# Patient Record
Sex: FEMALE | Race: BLACK OR AFRICAN AMERICAN | Employment: UNEMPLOYED | URBAN - METROPOLITAN AREA
[De-identification: names, ages, dates, MRNs, and addresses within clinical notes are randomized per-mention and may not be internally consistent; named-entity substitution may affect disease eponyms.]

---

## 2018-05-02 ENCOUNTER — APPOINTMENT (OUTPATIENT)
Dept: CT IMAGING | Facility: CLINIC | Age: 28
End: 2018-05-02
Attending: EMERGENCY MEDICINE
Payer: COMMERCIAL

## 2018-05-02 ENCOUNTER — HOSPITAL ENCOUNTER (EMERGENCY)
Facility: CLINIC | Age: 28
Discharge: HOME | End: 2018-05-02
Attending: EMERGENCY MEDICINE | Admitting: EMERGENCY MEDICINE
Payer: COMMERCIAL

## 2018-05-02 VITALS
SYSTOLIC BLOOD PRESSURE: 110 MMHG | BODY MASS INDEX: 25.71 KG/M2 | HEIGHT: 66 IN | WEIGHT: 160 LBS | OXYGEN SATURATION: 98 % | HEART RATE: 86 BPM | RESPIRATION RATE: 16 BRPM | DIASTOLIC BLOOD PRESSURE: 68 MMHG | TEMPERATURE: 98.6 F

## 2018-05-02 DIAGNOSIS — S09.90XA CLOSED HEAD INJURY, INITIAL ENCOUNTER: ICD-10-CM

## 2018-05-02 DIAGNOSIS — F25.9 SCHIZOAFFECTIVE DISORDER, UNSPECIFIED TYPE (H): ICD-10-CM

## 2018-05-02 DIAGNOSIS — S00.93XA TRAUMATIC CEPHALOHEMATOMA, INITIAL ENCOUNTER: ICD-10-CM

## 2018-05-02 LAB — ALCOHOL BREATH TEST: 0 (ref 0–0.01)

## 2018-05-02 PROCEDURE — 96372 THER/PROPH/DIAG INJ SC/IM: CPT

## 2018-05-02 PROCEDURE — 25000128 H RX IP 250 OP 636: Performed by: EMERGENCY MEDICINE

## 2018-05-02 PROCEDURE — 70450 CT HEAD/BRAIN W/O DYE: CPT

## 2018-05-02 PROCEDURE — 90791 PSYCH DIAGNOSTIC EVALUATION: CPT

## 2018-05-02 PROCEDURE — 99285 EMERGENCY DEPT VISIT HI MDM: CPT | Mod: 25

## 2018-05-02 RX ORDER — LORAZEPAM 2 MG/ML
2 INJECTION INTRAMUSCULAR ONCE
Status: COMPLETED | OUTPATIENT
Start: 2018-05-02 | End: 2018-05-02

## 2018-05-02 RX ORDER — DIPHENHYDRAMINE HYDROCHLORIDE 50 MG/ML
INJECTION INTRAMUSCULAR; INTRAVENOUS
Status: DISCONTINUED
Start: 2018-05-02 | End: 2018-05-02 | Stop reason: HOSPADM

## 2018-05-02 RX ORDER — HALOPERIDOL 5 MG/ML
INJECTION INTRAMUSCULAR
Status: DISCONTINUED
Start: 2018-05-02 | End: 2018-05-02 | Stop reason: HOSPADM

## 2018-05-02 RX ORDER — LORAZEPAM 2 MG/ML
INJECTION INTRAMUSCULAR
Status: DISCONTINUED
Start: 2018-05-02 | End: 2018-05-02 | Stop reason: HOSPADM

## 2018-05-02 RX ORDER — DIPHENHYDRAMINE HYDROCHLORIDE 50 MG/ML
50 INJECTION INTRAMUSCULAR; INTRAVENOUS ONCE
Status: COMPLETED | OUTPATIENT
Start: 2018-05-02 | End: 2018-05-02

## 2018-05-02 RX ORDER — HALOPERIDOL 5 MG/ML
10 INJECTION INTRAMUSCULAR ONCE
Status: COMPLETED | OUTPATIENT
Start: 2018-05-02 | End: 2018-05-02

## 2018-05-02 RX ADMIN — LORAZEPAM 2 MG: 2 INJECTION INTRAMUSCULAR; INTRAVENOUS at 03:40

## 2018-05-02 RX ADMIN — HALOPERIDOL LACTATE 10 MG: 5 INJECTION, SOLUTION INTRAMUSCULAR at 03:21

## 2018-05-02 RX ADMIN — DIPHENHYDRAMINE HYDROCHLORIDE 50 MG: 50 INJECTION, SOLUTION INTRAMUSCULAR; INTRAVENOUS at 03:40

## 2018-05-02 NOTE — ED NOTES
Patient denies pain currently and ready for her DEC assessment. Did explain that she would talk with someone via computer and patient willing to speak to them. Patient declined anything to eat. [place] police outside room and staff sitting with patient.

## 2018-05-02 NOTE — ED NOTES
Ordered meal tray for pt, pt will respond to questions when asked but is very drowsy still. Small abrasion noted on pt forehead, small bruise on front of RLE. No other signs of visible injuries pt denies pain when asked. VSS. Sitter at bedside with PD officer.
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
## 2018-05-02 NOTE — ED AVS SNAPSHOT
[facility] Emergency Department    [address]    [facility] [address]    Phone:  [phone]    Fax:  [phone]                                       Ilene Liu   MRN: [id number]    Department:  [facility] Emergency Department   Date of Visit:  5/2/2018           After Visit Summary Signature Page     I have received my discharge instructions, and my questions have been answered. I have discussed any challenges I see with this plan with the nurse or doctor.    ..........................................................................................................................................  Patient/Patient Representative Signature      ..........................................................................................................................................  Patient Representative Print Name and Relationship to Patient    ..................................................               ................................................  Date                                            Time    ..........................................................................................................................................  Reviewed by Signature/Title    ...................................................              ..............................................  Date                                                            Time
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
## 2018-05-02 NOTE — DISCHARGE INSTRUCTIONS
"  Recognizing Suicide Warning Signs in Yourself    People who are thinking about suicide may not know they are depressed. Certain thoughts, feelings, and actions can be signals that let you know you may need help. The best thing you can do is watch for signs that you may be at risk. Then, ask for help. You can talk to your regular healthcare provider or seek help from a mental health provider.  Depression  Depression is a treatable illness. To know if depression is causing you to feel like ending your life, ask yourself:    Do I feel worthless, guilty, helpless, or hopeless?    Have I been feeling sad, down, or blue on most days?    Have I lost interest in my work or people I used to enjoy?    Do I have trouble sleeping or do I sleep too much?    Do I eat more or less than usual?    Do I feel tired, weak, and low on energy?    Do I feel restless and unable to sit still?    Do I have trouble thinking or making choices?    Do I cry more than usual?    Do I feel life isn't worth living?  Warning signs for suicide    Thinking often about taking your life    Planning how you may attempt it    Talking or writing about committing suicide    Feeling that death is the only solution to your problems    Feeling a pressing need to make out your will or arrange your     Giving away things you own    Participating in risky behaviors, such as sex with someone you don't know or drinking and driving    Buying a lethal weapon, such as a gun, or hoarding medicines that could be used in an over dose  If you notice any of these warning signs, call for help right away or go to your closest hospital emergency department. You can also call a mental health clinic or a 24-hour suicide crisis hotline for help and support. Search for local suicide prevention resources on your computer or look for the number in the white pages of your phone book under \"Suicide.\" In an emergency, if you are in immediate risk of harming yourself, call 911. " For more information about depression:    National [place] of Mental Chswbu313-644-0772pcr.[facility].nih.gov    National Suicide Prevention Tuzdgtxn134-624-2678 (320-318-XVQY)www.suicidepreventionlifeline.org    National [place] on Mental Zyugabk681-456-4064pfr.jose.org    Mental Health Ppeejdp539-838-3046hkb.[facility].org    National Suicide Dpgqoxd137-987-1140 (800-SUICIDE)   Date Last Reviewed: 1/1/2017 2000-2017 [employer]. [address] [place] [address]. All rights reserved. This information is not intended as a substitute for professional medical care. Always follow your healthcare professional's instructions.    Discharge Instructions  Head Injury    You have been seen today for a head injury. Your evaluation included a history and physical examination. You may have had a CT (CAT) scan performed, though most head injuries do not require a scan. Based on this evaluation, your provider today does not feel that your head injury is serious.    Generally, every Emergency Department visit should have a follow-up clinic visit with either a primary or a specialty clinic/provider. Please follow-up as instructed by your emergency provider today.  Return to the Emergency Department if:    You are confused or you are not acting right.    Your headache gets worse or you start to have a really bad headache even with your recommended treatment plan.    You vomit (throw up) more than once.    You have a seizure.    You have trouble walking.    You have weakness or paralysis (cannot move) in an arm or a leg.    You have blood or fluid coming from your ears or nose.    You have new symptoms or anything that worries you.    Sleeping:  It is okay for you to sleep, but someone should wake you up if instructed by your provider, and someone should check on you at your usual time to wake up.     Activity:    Do not drive for at least 24 hours.    Do not drive if you have dizzy spells or trouble concentrating,  or remembering things.    Do not return to any contact sports until cleared by your regular provider.     MORE INFORMATION:    Concussion:  A concussion is a minor head injury that may cause temporary problems with the way the brain works. Although concussions are important, they are generally not an emergency or a reason that a person needs to be hospitalized. Some concussion symptoms include confusion, amnesia (forgetful), nausea (sick to your stomach) and vomiting (throwing up), dizziness, fatigue, memory or concentration problems, irritability and sleep problems. For most people, concussions are mild and temporary but some will have more severe and persistent symptoms that require on-going care and treatment.  CT Scans: Your evaluation today may have included a CT scan (CAT scan) to look for things like bleeding or a skull fracture (broken bone).  CT scans involve radiation and too many CT scans can cause serious health problems like cancer, especially in children.  Because of this, your provider may not have ordered a CT scan today if they think you are at low risk for a serious or life threatening problem.    If you were given a prescription for medicine here today, be sure to read all of the information (including the package insert) that comes with your prescription.  This will include important information about the medicine, its side effects, and any warnings that you need to know about.  The pharmacist who fills the prescription can provide more information and answer questions you may have about the medicine.  If you have questions or concerns that the pharmacist cannot address, please call or return to the Emergency Department.     Remember that you can always come back to the Emergency Department if you are not able to see your regular provider in the amount of time listed above, if you get any new symptoms, or if there is anything that worries you.

## 2018-05-02 NOTE — ED NOTES
Brought computer in for DEC assessment for patient to be evaluated. [place] [employer] and sitter at bedside watching for patients safety.

## 2018-05-02 NOTE — ED PROVIDER NOTES
History     Chief Complaint:  Head injury     HPI   Ilene Liu is a 28 year old female who presents to the emergency department today for evaluation of head injury. The police report the patient was arrested for second degree assault tonight. She threatened another kalin on scene with a knife. She then started screaming and yelling at the police after a male  searched her, demanding a female officer to search her instead. She then apologized to police, but then began screaming at the victim. She then apologized to the victim, but after she realized she was getting arrested she stopped cooperating, became aggressive, and started hitting her head multiple times against the partition. The patient briefly lost consciousness. The police report the patient was possibly drinking tonight with marijuana use. Per police, per the patient's friend, the patient has a problem with alcohol or marijuana use. The patient reports she does not remember what happened tonight and denies alcohol use tonight. The patient reports she does not know where she is. The patient denies abdominal pain.    Allergies:  No Known Drug Allergies    Medications:    Medications reviewed. No current medications.     Past Medical History:    Schizo-affective type schizophrenia, in remission    Past Surgical History:    Surgical history reviewed. No pertinent surgical history.    Family History:    Family history reviewed. No pertinent family history.     Social History:  The patient was accompanied to the ED by police.  Smoking Status: Current Every Day Smoker  Smokeless Tobacco: Never Used  Alcohol Use: Negative per patient  Illicit Drug Use: Positive for Marijuana    Marital Status:  Single [1]     Review of Systems   Unable to perform ROS: Mental status change     Physical Exam     Patient Vitals for the past 24 hrs:   BP Temp Temp src Pulse Resp SpO2 Height Weight   05/02/18 0525 92/50 - - 80 20 100 % - -   05/02/18 0415 107/60  "- - - - 99 % - -   05/02/18 0400 104/58 - - - - 100 % - -   05/02/18 0345 117/71 - - - - 100 % - -   05/02/18 0342 - - - - - 100 % - -   05/02/18 0340 - - - - - 100 % - -   05/02/18 0339 - - - - - 100 % - -   05/02/18 0336 - - - - - 100 % - -   05/02/18 0332 129/84 - - - - 100 % - -   05/02/18 0330 122/84 - - - - - - -   05/02/18 0257 (!) 133/94 98.6  F (37  C) Oral 98 18 100 % 1.676 m (5' 6\") 72.6 kg (160 lb)     Physical Exam  Constitutional: Alert, refusing to answer questions, mumbles responses, young woman sitting in bed, holding her hands against her forehead  HENT: frontal cephalohematoma 3x2 cm, no hemotympanum    Nose: Nose normal.    Mouth/Throat: Oropharynx is clear, mucous membranes are moist   Eyes: Normal conjunctiva. Pupils are equal, round, and reactive to light.   Neck: full active range of motion  CV: regular rate and rhythm; no murmurs, rubs or gallups  Chest: Effort normal and breath sounds normal.   GI:  There is no tenderness. No distension. Normal bowel sounds  MSK: Normal range of motion.   Neurological: Alert, eyes open, moving all extremities and follows commands in all extremities  Skin: Skin is warm and dry.  Psych: refusing to answer questions, delayed responses  Emergency Department Course     Imaging:  Radiology findings were communicated with the patient who voiced understanding of the findings.    CT Head w/o Contrast  No evidence of acute intracranial trauma.  Reading per radiology    Laboratory:    Alcohol breath test POCT: 0.00    Interventions:  0321 Haldol 10 mg IM  0340 Benadryl 50 mg IM  0340 Ativan 2 mg IV    Emergency Department Course:    0257 Nursing notes and vitals reviewed.    0300 I performed an exam of the patient as documented above.     0318 Code 21 called.  Patient tried to choke herself with pulse oximeter by wrapping it around her neck.  Pulse oximeter removed, and patient placed in five point restraints placed for violent behavior towards self.  Chemical " restraints also initiated.     0331 The patient is yelling and continues to be agitated after haldol. Additional IM meds were ordered.     0333  I rechecked the patient.     0402  In person face to face assessment completed, including an evaluation of the patient's immediate reaction to the intervention, complete review of systems assessment, behavioral assessment and review/assessment of history, drugs and medications, recent labs, etc., and behavioral condition.  The patient experienced: No adverse physical outcome from seclusion/restraint initiation.  The intervention of restraint was discontinued.  Personal sitter will be at bedside to watch patient.      0412 I rechecked the patient.    0423 The patient was sent for a CT Head w/o Contrast while in the emergency department, results above.     0523 I rechecked the patient.     0638  I rechecked the patient.  Will wake up to verbal stimulus and sit up in bed.  She still has wobbly gait and is not yet able to fully ambulate on her own.      The patient will be transferred back into the custody of police when she is able to ambulate with a steady gait on her own.     Impression & Plan      Medical Decision Making:  Ilene Liu is a 28 year old female with history of schizoaffective disorder, who presents to the emergency department today for evaluation of head trauma and period of unresponsiveness while in police custody.  Differential diagnosis includes traumatic brain injury, intracranial hemorrhage, concussion, malingering, alcohol intoxication, drug intoxication, schizophrenia, schizoaffective disorder, bipolar.  Patient was originally cooperative with exam, but seemed confused. She then tried to strangle herself using the pulse oximeter cord, and had to be placed in 5 point restraints and chemically sedated as above.  There is no evidence of soft or secondary signs of aerodigestive injury with examination of her neck.  There had previously been no  mention of suicidality. I performed a repeat face-to-face examination, restraints were able to be removed and she was cooperative enough to get the CT scan of her head.  It does not show any evidence of intracranial hemorrhage or skull fracture.      Patient is being watched by sitter given suicidal and impulsive behavior.  She has not yet been able to ambulate with a stable gait on her own yet secondary to receiving Benadryl, Ativan, and Haldol for sedation. No symptoms or report of ingestion.  Once she is medically cleared with a stable gait, she will be discharged back into the custody of police, who are here at bedside.  She was previously under arrest earlier tonight when she was threatening someone with a knife.  We will keep her on suicide watch while in penitentiary.  This plan was discussed with the oncoming ED physician, Dr. Peterson.      Diagnosis:    ICD-10-CM    1. Traumatic cephalohematoma, initial encounter S00.93XA    2. Closed head injury, initial encounter S09.90XA      Disposition:   The patient was signed out to the oncoming ED physician, Dr. Peterson.    Scribe Disclosure:  Cooper CHAPPELL, am serving as a scribe at 2:54 AM on 5/2/2018 to document services personally performed by Gniny Curran MD based on my observations and the provider's statements to me.    [facility] EMERGENCY DEPARTMENT       Ginny Curran MD  05/02/18 0716       Ginny Curran MD  05/02/18 0853

## 2018-05-02 NOTE — ED PROVIDER NOTES
Patient signed out to me by Dr. Curran awaiting return to baseline mental status and ambulation trial after arriving in police custody for alleged assault. On review of chart, it appears as though the patient also made threats and actions to harm herself in the ED and therefore DEC evaluation will be obtained.     On reassessment the patient is sleeping. She is easily awakened and cooperative briefly, but quickly falls back to sleep. She is denying any pain, but does not give a clear answer on SI. She is otherwise stable and calm. No indication for restraints. Both a sitter and  are with the patient at this time awaiting increased level of awakeness and DEC evaluation prior to discharge in police custody.     Caitlin Peterson MD  05/02/18 [id number]

## 2018-05-02 NOTE — ED NOTES
[facility] patient coughing / possible vomiting sound. Entered room to find patient had tied the pulse oximeter cord around her neck several times and pulling in opposite directions in a choking fashion. Called for help, removed cord with assistance of officer. Additional staff in room. CODE 21 called over head. Security present, patient verbally screaming and threatening staff with spitting/physical assault. Restraints applied per facility protocol. CMS intact all limbs. Chest restraint also applied all attachments secured. Medicated per MAR. All staff / patient safety maintained during Code 21. Physician in room.

## 2018-05-02 NOTE — ED NOTES
Patient restraints removed for CT, accompanied by Security & staff. Patient cooperative stating she will cooperate

## 2018-05-02 NOTE — ED NOTES
"Patient verbally screaming \"Fuck Ya, Fuck you\". Patient screaming out multiple verbal assaultive phrases and uncontrolled.   "

## 2018-05-02 NOTE — ED NOTES
Patient continues to be verbally abusive, screaming out, threatening despite all de-escalating techniques applied by staff. Patient medicated per MAR.

## 2018-11-27 ENCOUNTER — TRANSFERRED RECORDS (OUTPATIENT)
Dept: HEALTH INFORMATION MANAGEMENT | Facility: CLINIC | Age: 28
End: 2018-11-27

## 2018-11-27 ENCOUNTER — HOSPITAL ENCOUNTER (INPATIENT)
Facility: CLINIC | Age: 28
LOS: 42 days | Discharge: HOME | End: 2019-01-09
Attending: PSYCHIATRY & NEUROLOGY | Admitting: PSYCHIATRY & NEUROLOGY
Payer: COMMERCIAL

## 2018-11-27 DIAGNOSIS — F17.213 CIGARETTE NICOTINE DEPENDENCE WITH WITHDRAWAL: ICD-10-CM

## 2018-11-27 DIAGNOSIS — F19.10 POLYSUBSTANCE ABUSE (H): ICD-10-CM

## 2018-11-27 DIAGNOSIS — F25.0 SCHIZOAFFECTIVE DISORDER, BIPOLAR TYPE (H): Primary | ICD-10-CM

## 2018-11-27 DIAGNOSIS — F25.9 SCHIZOAFFECTIVE DISORDER, UNSPECIFIED TYPE (H): ICD-10-CM

## 2018-11-27 PROCEDURE — 99285 EMERGENCY DEPT VISIT HI MDM: CPT | Mod: 25 | Performed by: PSYCHIATRY & NEUROLOGY

## 2018-11-27 PROCEDURE — 99284 EMERGENCY DEPT VISIT MOD MDM: CPT | Mod: Z6 | Performed by: PSYCHIATRY & NEUROLOGY

## 2018-11-27 PROCEDURE — 25000132 ZZH RX MED GY IP 250 OP 250 PS 637: Performed by: PSYCHIATRY & NEUROLOGY

## 2018-11-27 PROCEDURE — 90791 PSYCH DIAGNOSTIC EVALUATION: CPT

## 2018-11-27 RX ORDER — OLANZAPINE 10 MG/1
10 TABLET, ORALLY DISINTEGRATING ORAL ONCE
Status: COMPLETED | OUTPATIENT
Start: 2018-11-27 | End: 2018-11-27

## 2018-11-27 RX ORDER — HYDROXYZINE HYDROCHLORIDE 25 MG/1
25 TABLET, FILM COATED ORAL 3 TIMES DAILY PRN
Status: ON HOLD | COMMUNITY
Start: 2018-09-25 | End: 2019-01-07

## 2018-11-27 RX ORDER — OLANZAPINE 10 MG/2ML
10 INJECTION, POWDER, FOR SOLUTION INTRAMUSCULAR ONCE
Status: DISCONTINUED | OUTPATIENT
Start: 2018-11-27 | End: 2018-11-28 | Stop reason: CLARIF

## 2018-11-27 RX ORDER — OLANZAPINE 10 MG/1
10 TABLET, ORALLY DISINTEGRATING ORAL ONCE
Status: DISCONTINUED | OUTPATIENT
Start: 2018-11-27 | End: 2018-11-28 | Stop reason: CLARIF

## 2018-11-27 RX ORDER — OLANZAPINE 10 MG/1
10 TABLET ORAL AT BEDTIME
Status: ON HOLD | COMMUNITY
Start: 2018-09-25 | End: 2019-01-07

## 2018-11-27 RX ORDER — OLANZAPINE 5 MG/1
5 TABLET ORAL EVERY MORNING
Status: ON HOLD | COMMUNITY
Start: 2018-09-25 | End: 2019-01-07

## 2018-11-27 RX ORDER — ERGOCALCIFEROL 1.25 MG/1
50000 CAPSULE, LIQUID FILLED ORAL
Status: ON HOLD | COMMUNITY
Start: 2018-09-01 | End: 2019-01-07

## 2018-11-27 RX ORDER — BUPROPION HYDROCHLORIDE 150 MG/1
150 TABLET ORAL DAILY
Status: ON HOLD | COMMUNITY
Start: 2018-09-25 | End: 2019-01-07

## 2018-11-27 RX ADMIN — OLANZAPINE 10 MG: 10 TABLET, ORALLY DISINTEGRATING ORAL at 19:48

## 2018-11-27 RX ADMIN — OLANZAPINE 10 MG: 10 TABLET, ORALLY DISINTEGRATING ORAL at 23:50

## 2018-11-27 ASSESSMENT — ENCOUNTER SYMPTOMS
NERVOUS/ANXIOUS: 1
CHEST TIGHTNESS: 0
SHORTNESS OF BREATH: 0
DIZZINESS: 0
FEVER: 0
BACK PAIN: 0
HALLUCINATIONS: 1
ABDOMINAL PAIN: 0
DYSPHORIC MOOD: 0

## 2018-11-27 NOTE — ED NOTES
ED to Behavioral Floor Handoff    SITUATION  Ilene Liu is a 28 year old female who speaks English and lives in a home with others  The patient arrived in the ED by POLICE from home with a complaint of Hallucinations (hx of schizophrenia. pt states she has people following her, watching her, etc. started acting aggressively with residents of her apartment [facility])  .    The patient has had symptoms for 3 year(s) and during this time the symptoms have increased.     Initial vitals were: BP: 111/73  Pulse: 85  SpO2: 98 %   Is the patient diabetic? No   If yes, last blood glucose? --  If yes, was this treated in the ED? --  Does the patient have a seizure history? No   If yes, date of most recent seizure--  Is the patient inebriated (ETOH) or intoxicated (other substance)? No  MSSA done? N/A  Last MSSA score: --  Were withdrawal symptoms treated? N/A  Is the patient patient experiencing suicidal ideation? denies current or recent suicidal ideation     Homicidal ideation? denies current or recent homicidal ideation or behaviors.    Self-injurious behavior/urges? denies current or recent self injurious behavior or ideation.  Was pt aggressive in the ED No  Was a code called No  Is the pt now cooperative? Yes  Meds given in ED: Medications - No data to display   Family present during ED course? No  Family currently present? No    BACKGROUND  In the ED, pt was diagnosed with   Final diagnoses:   Schizoaffective disorder, unspecified type (H)   Polysubstance abuse (H)      Does the patient have a cognitive impairment or developmental disability? No  Patient's home meds are:  Allergies: No Known Allergies.   Social demographics are   Social History     Social History     Marital status: Single     Spouse name: N/A     Number of children: N/A     Years of education: N/A     Social History Main Topics     Smoking status: Current Every Day Smoker     Smokeless tobacco: Never Used     Alcohol use Yes     Drug use: Yes      "Special: Marijuana, \"Crack\" cocaine     Sexual activity: Not Asked     Other Topics Concern     None     Social History Narrative        ASSESSMENT  Labs results Labs Ordered and Resulted from Time of ED Arrival Up to the Time of Departure from the ED - No data to display   Imaging Studies: No results found for this or any previous visit (from the past 24 hour(s)).   Most recent vital signs /73  Pulse 85  LMP 10/31/2018 (Approximate)  SpO2 98%   Abnormal labs/tests/findings requiring intervention:---   Pain control: pt had none  Nausea control: pt had none    RECOMMENDATION  Are any infection precautions needed (MRSA, VRE, etc.)? No   If yes, what infection? --  Does the patient mobility issues? independently.  If yes what device does the pt use? ---  Is patient on 72 hour hold or commitment? Yes  If on 72 hour hold, have hold and rights been given to patient? Yes  Are admitting orders written if after 10 p.m. ?N/A  Tasks needing to be completed:---     Yarely Joya   asc-- voica   [phone] [facility]   [phone] [facility]  "

## 2018-11-27 NOTE — ED NOTES
Pt has been placed on a 72 hour hold. She has been given a copy of the hold and the pt bill of rights.

## 2018-11-27 NOTE — ED PROVIDER NOTES
History     Chief Complaint   Patient presents with     Hallucinations     hx of schizophrenia. pt states she has people following her, watching her, etc. started acting aggressively with residents of her apartment bldg     The history is provided by the patient, medical records and a relative ().     Ilene Liu is a 28 year old female who comes in due to her calling the police secondary to her hallucinations and paranoia.  She has been believing that people are following her.  She is hearing voices.  She is a poor historian.  She called the police today due to feeling that someone was following her.  They brought her to the ED.  She has been aggressive at the apartment building she has been staying at.  She lives between friends and family.  She has been diagnosed with schizoaffective disorder and polysubstance dependence.  She is on a provisional discharge.  Per the , she has been using cocaine, alcohol and marijuana.  It is questionable if she has been taking her medications.  The  would like to revoke her provisional discharge.  The patient was committed through [facility].  She has been calm but not overly cooperative.  She has refused to give a urine sample.      Please see the 's assessment in EPIC from today (11/27/18) for further details.    I have reviewed the Medications, Allergies, Past Medical and Surgical History, and Social History in the Epic system.    Review of Systems   Constitutional: Negative for fever.   Eyes: Negative for visual disturbance.   Respiratory: Negative for chest tightness and shortness of breath.    Cardiovascular: Negative for chest pain.   Gastrointestinal: Negative for abdominal pain.   Musculoskeletal: Negative for back pain.   Neurological: Negative for dizziness.   Psychiatric/Behavioral: Positive for behavioral problems and hallucinations. Negative for dysphoric mood, self-injury and suicidal ideas. The patient is  nervous/anxious.    All other systems reviewed and are negative.      Physical Exam   BP: 111/73  Pulse: 85  SpO2: 98 %      Physical Exam   Constitutional: She is oriented to person, place, and time. She appears well-developed and well-nourished.   Cardiovascular: Normal rate, regular rhythm and normal heart sounds.    Pulmonary/Chest: Effort normal and breath sounds normal. No respiratory distress.   Neurological: She is alert and oriented to person, place, and time.   Psychiatric: She has a normal mood and affect. Her speech is normal. She is actively hallucinating. Thought content is paranoid and delusional. Cognition and memory are normal. She expresses inappropriate judgment. She expresses no homicidal and no suicidal ideation. She expresses no suicidal plans and no homicidal plans.   Ilene is a 29 y/o female who looks her age.  She is well groomed with good eye contact.   Nursing note and vitals reviewed.      ED Course     ED Course     Procedures               Labs Ordered and Resulted from Time of ED Arrival Up to the Time of Departure from the ED - No data to display         Assessments & Plan (with Medical Decision Making)   Ilene will be admitted to the hospital due to her decompensation, use of drugs and possible not taking medications.  Her provisional discharge is being revoked by the  but the paperwork has not been sent as the time of this note.  She will be put on a  72 hour hold until the revocation paperwork arrives.      I have reviewed the nursing notes.    I have reviewed the findings, diagnosis, plan and need for follow up with the patient.    New Prescriptions    No medications on file       Final diagnoses:   Schizoaffective disorder, unspecified type (H)   Polysubstance abuse (H)       11/27/2018   [facility] [place], EMERGENCY DEPARTMENT     Giuliano Rousseau MD  11/27/18 3652

## 2018-11-28 PROBLEM — F25.9 SCHIZOAFFECTIVE DISORDER (H): Status: ACTIVE | Noted: 2018-11-28

## 2018-11-28 PROBLEM — F19.10 POLYSUBSTANCE ABUSE (H): Status: ACTIVE | Noted: 2018-11-28

## 2018-11-28 LAB
ALBUMIN SERPL-MCNC: 3.5 G/DL (ref 3.4–5)
ALP SERPL-CCNC: 46 U/L (ref 40–150)
ALT SERPL W P-5'-P-CCNC: 18 U/L (ref 0–50)
ANION GAP SERPL CALCULATED.3IONS-SCNC: 7 MMOL/L (ref 3–14)
AST SERPL W P-5'-P-CCNC: 15 U/L (ref 0–45)
BASOPHILS # BLD AUTO: 0 10E9/L (ref 0–0.2)
BASOPHILS NFR BLD AUTO: 0 %
BILIRUB SERPL-MCNC: 0.5 MG/DL (ref 0.2–1.3)
BUN SERPL-MCNC: 13 MG/DL (ref 7–30)
CALCIUM SERPL-MCNC: 9 MG/DL (ref 8.5–10.1)
CHLORIDE SERPL-SCNC: 107 MMOL/L (ref 94–109)
CO2 SERPL-SCNC: 26 MMOL/L (ref 20–32)
CREAT SERPL-MCNC: 0.48 MG/DL (ref 0.52–1.04)
DIFFERENTIAL METHOD BLD: ABNORMAL
EOSINOPHIL # BLD AUTO: 0.2 10E9/L (ref 0–0.7)
EOSINOPHIL NFR BLD AUTO: 5.1 %
ERYTHROCYTE [DISTWIDTH] IN BLOOD BY AUTOMATED COUNT: 12.2 % (ref 10–15)
GFR SERPL CREATININE-BSD FRML MDRD: >90 ML/MIN/1.7M2
GLUCOSE SERPL-MCNC: 140 MG/DL (ref 70–99)
HCT VFR BLD AUTO: 39.8 % (ref 35–47)
HGB BLD-MCNC: 13.8 G/DL (ref 11.7–15.7)
IMM GRANULOCYTES # BLD: 0 10E9/L (ref 0–0.4)
IMM GRANULOCYTES NFR BLD: 0.3 %
LYMPHOCYTES # BLD AUTO: 1.4 10E9/L (ref 0.8–5.3)
LYMPHOCYTES NFR BLD AUTO: 36.8 %
MCH RBC QN AUTO: 31.7 PG (ref 26.5–33)
MCHC RBC AUTO-ENTMCNC: 34.7 G/DL (ref 31.5–36.5)
MCV RBC AUTO: 91 FL (ref 78–100)
MONOCYTES # BLD AUTO: 0.2 10E9/L (ref 0–1.3)
MONOCYTES NFR BLD AUTO: 4.9 %
NEUTROPHILS # BLD AUTO: 2.1 10E9/L (ref 1.6–8.3)
NEUTROPHILS NFR BLD AUTO: 52.9 %
NRBC # BLD AUTO: 0 10*3/UL
NRBC BLD AUTO-RTO: 0 /100
PLATELET # BLD AUTO: 181 10E9/L (ref 150–450)
POTASSIUM SERPL-SCNC: 3.8 MMOL/L (ref 3.4–5.3)
PROT SERPL-MCNC: 7.3 G/DL (ref 6.8–8.8)
RBC # BLD AUTO: 4.36 10E12/L (ref 3.8–5.2)
SODIUM SERPL-SCNC: 140 MMOL/L (ref 133–144)
TSH SERPL DL<=0.005 MIU/L-ACNC: 0.4 MU/L (ref 0.4–4)
WBC # BLD AUTO: 3.9 10E9/L (ref 4–11)

## 2018-11-28 PROCEDURE — 36415 COLL VENOUS BLD VENIPUNCTURE: CPT | Performed by: PSYCHIATRY & NEUROLOGY

## 2018-11-28 PROCEDURE — 99223 1ST HOSP IP/OBS HIGH 75: CPT | Mod: AI | Performed by: PSYCHIATRY & NEUROLOGY

## 2018-11-28 PROCEDURE — 85025 COMPLETE CBC W/AUTO DIFF WBC: CPT | Performed by: PSYCHIATRY & NEUROLOGY

## 2018-11-28 PROCEDURE — 25000132 ZZH RX MED GY IP 250 OP 250 PS 637: Performed by: PSYCHIATRY & NEUROLOGY

## 2018-11-28 PROCEDURE — 12400003 ZZH R&B MH CRITICAL UMMC

## 2018-11-28 PROCEDURE — 84443 ASSAY THYROID STIM HORMONE: CPT | Performed by: PSYCHIATRY & NEUROLOGY

## 2018-11-28 PROCEDURE — 80053 COMPREHEN METABOLIC PANEL: CPT | Performed by: PSYCHIATRY & NEUROLOGY

## 2018-11-28 RX ORDER — BISACODYL 5 MG
5 TABLET, DELAYED RELEASE (ENTERIC COATED) ORAL DAILY PRN
Status: DISCONTINUED | OUTPATIENT
Start: 2018-11-28 | End: 2019-01-09 | Stop reason: HOSPADM

## 2018-11-28 RX ORDER — BUPROPION HYDROCHLORIDE 150 MG/1
150 TABLET ORAL DAILY
Status: DISCONTINUED | OUTPATIENT
Start: 2018-11-28 | End: 2018-11-28

## 2018-11-28 RX ORDER — TRAZODONE HYDROCHLORIDE 50 MG/1
50 TABLET, FILM COATED ORAL
Status: DISCONTINUED | OUTPATIENT
Start: 2018-11-28 | End: 2019-01-09 | Stop reason: HOSPADM

## 2018-11-28 RX ORDER — ACETAMINOPHEN 325 MG/1
325 TABLET ORAL EVERY 4 HOURS PRN
Status: DISCONTINUED | OUTPATIENT
Start: 2018-11-28 | End: 2018-11-28

## 2018-11-28 RX ORDER — ACETAMINOPHEN 325 MG/1
650 TABLET ORAL EVERY 4 HOURS PRN
Status: DISCONTINUED | OUTPATIENT
Start: 2018-11-28 | End: 2019-01-09 | Stop reason: HOSPADM

## 2018-11-28 RX ORDER — ALUMINA, MAGNESIA, AND SIMETHICONE 2400; 2400; 240 MG/30ML; MG/30ML; MG/30ML
30 SUSPENSION ORAL EVERY 4 HOURS PRN
Status: DISCONTINUED | OUTPATIENT
Start: 2018-11-28 | End: 2018-12-16

## 2018-11-28 RX ORDER — OLANZAPINE 5 MG/1
5 TABLET ORAL EVERY MORNING
Status: DISCONTINUED | OUTPATIENT
Start: 2018-11-28 | End: 2018-11-29

## 2018-11-28 RX ORDER — HYDROXYZINE HYDROCHLORIDE 25 MG/1
25 TABLET, FILM COATED ORAL 3 TIMES DAILY PRN
Status: DISCONTINUED | OUTPATIENT
Start: 2018-11-28 | End: 2019-01-09 | Stop reason: HOSPADM

## 2018-11-28 RX ORDER — CLOZAPINE 25 MG/1
25 TABLET, ORALLY DISINTEGRATING ORAL AT BEDTIME
Status: DISCONTINUED | OUTPATIENT
Start: 2018-11-28 | End: 2018-11-30

## 2018-11-28 RX ORDER — OLANZAPINE 10 MG/2ML
10 INJECTION, POWDER, FOR SOLUTION INTRAMUSCULAR 3 TIMES DAILY PRN
Status: DISCONTINUED | OUTPATIENT
Start: 2018-11-28 | End: 2019-01-09 | Stop reason: HOSPADM

## 2018-11-28 RX ORDER — TRAZODONE HYDROCHLORIDE 50 MG/1
50 TABLET, FILM COATED ORAL AT BEDTIME
Status: DISCONTINUED | OUTPATIENT
Start: 2018-11-28 | End: 2018-11-28

## 2018-11-28 RX ORDER — OLANZAPINE 10 MG/1
10 TABLET, ORALLY DISINTEGRATING ORAL 3 TIMES DAILY PRN
Status: DISCONTINUED | OUTPATIENT
Start: 2018-11-28 | End: 2019-01-09 | Stop reason: HOSPADM

## 2018-11-28 RX ORDER — OLANZAPINE 10 MG/1
10 TABLET ORAL AT BEDTIME
Status: DISCONTINUED | OUTPATIENT
Start: 2018-11-28 | End: 2018-11-29

## 2018-11-28 RX ADMIN — OLANZAPINE 10 MG: 10 TABLET, FILM COATED ORAL at 21:03

## 2018-11-28 RX ADMIN — OLANZAPINE 5 MG: 5 TABLET, FILM COATED ORAL at 09:04

## 2018-11-28 RX ADMIN — BUPROPION HYDROCHLORIDE 150 MG: 150 TABLET, FILM COATED, EXTENDED RELEASE ORAL at 09:04

## 2018-11-28 ASSESSMENT — ACTIVITIES OF DAILY LIVING (ADL)
ORAL_HYGIENE: INDEPENDENT;PROMPTS
GROOMING: PROMPTS
ORAL_HYGIENE: PROMPTS
DRESS: INDEPENDENT
GROOMING: PROMPTS
DRESS: SCRUBS (BEHAVIORAL HEALTH)

## 2018-11-28 NOTE — PROGRESS NOTES
"28 year old female admitted to unit from  ED on 72 hour hold at about 0105. Per report, patient called 911 to complain about voices she was hearing. Patient reports that she is here because \"people are following me - violating my fourth amendment rights - I want to be left alone.\"  Per report, the patient has a  History of schizoaffective disorder and substance use (alcohol, cocaine and marijuana) yet patient denied all substance use except tobacco.  Patient has thus far refused Utox.  Patient reports that she has been recently suicidal \"taking a bunch of pills but not an overdose\" within the last few days.  Patient states that she has had other attempts but will not elaborate.  Patient denies current SI, SIB, HI and hallucinations.  Patient makes many paranoid statements throughout the interview stating that there are cameras everywhere with the government and her neighbors stalking her.  Per DEC report, patient is on probation for assaulting police, she typically receives care at [facility] and she is currently under civil commitment and [place] til 1/19/19.    Upon admission, patient refused VS.  She did participate in most of safety search but did refuse to remove her bra.  Orders were received for an SIO due to the inability to complete the entire safety search.  Further attempts will be made to complete the search when the patient awakens.  "

## 2018-11-28 NOTE — ED NOTES
Pt. coming out of room yelling at security. When security  approached pt. she spit at them.  Writer then told pt. to stop spitting and return to room.  Pt. complied, and stated that she would take zyprexa ODT.  Given 10 mg Zyprexa ODT.

## 2018-11-28 NOTE — ED NOTES
"Pt refused oral Zyprexa at this time. \" I already got the med right now\" Pt paranoid, \" am I in trouble, why are they here\" am I in trouble?\" pt redirected  "
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
## 2018-11-28 NOTE — PLAN OF CARE
Problem: Patient Care Overview  Goal: Team Discussion  Team Plan:   BEHAVIORAL TEAM DISCUSSION    Participants: dr hull, christa alberto rn, opal payne [facility]  Progress: none yet; pt was just admitted.   She has not been taking meds, has been paranoid (thinking others are following her and watching her), has been aggressive and hostile toward family, has made suicidal statements, cancelled two apt's with her Co CM, and drinking alcohol daily.  Continued Stay Criteria/Rationale: due to the above.  Medical/Physical: per internal medicine  Precautions:   Behavioral Orders   Procedures     Code 1 - Restrict to Unit     Routine Programming     As clinically indicated     Status 15     Every 15 minutes.     Suicide precautions     Patients on Suicide Precautions should have a Combination Diet ordered that includes a Diet selection(s) AND a Behavioral Tray selection for Safe Tray - with utensils, or Safe Tray - NO utensils       Plan: meds per dr hull.    PD is being revoked.    July 10, 2018 - committed as MI to [facility].   Aranda:  Clozaril, Zyprexa, Abilify, Latuda.    Pt will need a remote PD at time of discharge.  This will be coordinated with [facility].   Discharge planning itself will be coordinated with pt's [facility] worker(s)  Rationale for change in precautions or plan: no change at this time.

## 2018-11-28 NOTE — PROGRESS NOTES
11/28/18 0349   Patient Belongings   Did you bring any home meds/supplements to the hospital?  Yes   Disposition of meds  Other (see comment)   Patient Belongings keys;money (see comment);purse;shoes;wallet;other (see comments)   Disposition of Belongings Other (see comment)   Belongings Search Yes   Clothing Search Yes   Second Staff RN   General Info Comment Pt had no cell phone and only had $0.96 in cash     -TWO BOTTLES OF MEDICATIONS GIVEN TO RN      -ITEMS IN PATIENT STORAGE LOCKER #3::    1 sweater- placed in pt locker  2 scarves- placed in pt locker  1 dress- placed in pt locker  1 pair of pants- placed in pt locker  2 skirts- placed in pt locker  1 jacket- placed in pt locker  1 box cigarettes- placed in pt locker  1 glass container- placed in pt locker  1 lighter- placed in pt locker  1 pair of shoes- placed in pt locker  1 shirt- placed in pt locker  1 toothbrush- placed in pt locker  1 pink phone case wallet (no actual phone in pt belongings)- placed in pt locker  1 key on a blue lanyard- placed in pt locker  1 pair underwear- placed in pt locker  1 phone   1 toothbrush  1 [place] Identification Card- placed in pt locker  $0.96 in coins   Miscellaneous paperwork- placed in pt locker      -ITEMS SENT IN SECURITY ENVELOPE #659201:    1 [place] EBT (3689)  1 Visa Debit Card (6181)      A               Admission:  I am responsible for any personal items that are not sent to the safe or pharmacy.  [place] [facility] is not responsible for loss, theft or damage of any property in my possession.    Signature:  _________________________________ Date: _______  Time: _____                                              Staff Signature:  ____________________________ Date: ________  Time: _____      2nd Staff person, if patient is unable/unwilling to sign:    Signature: ________________________________ Date: ________  Time: _____     Discharge:  Carmen has returned all of my personal belongings:    Signature:  _________________________________ Date: ________  Time: _____                                          Staff Signature:  ____________________________ Date: ________  Time: _____

## 2018-11-28 NOTE — PROGRESS NOTES
11/28/18 1300   Behavioral Health   Suicidality (WDL) WDL   1. Wish to be Dead No   2. Non-Specific Active Suicidal Thoughts  No   3. Active Sucidal Ideation with any Methods (Not Plan) Without Intent to Act  No   4. Active Suicidal Ideation with Some Intent to Act, Without Specific Plan  No   5. Active Suicidal Ideation with Specific Plan and Intent  No   Duration (Lifetime) NA   Change in Protective Factors? No   Enviromental Risk Factors None   Self Injury other (see comment)  (denies)   Pt is on SIO staffing due to active suicidal thought. Pt SIO was discontinued this afternoon at 1223 hrs. [facility] Suicide Risk Assessment completed and the assessment is due within 4020-5078. Pt denied any suicidal thought or plan. He contracted for safety while on this unit. Pt is alert and oriented to name and place. No agitating or aggressive behaviors observed.  Staff will continue to monitor patient per unit protocol.

## 2018-11-28 NOTE — PROGRESS NOTES
Pt was isolative to room and bed for entire shift, with sheet pulled above head. Pt was pleasant when approached with meals, though was guarded and refused to sign any consents and also refused blood draw.

## 2018-11-28 NOTE — PHARMACY-ADMISSION MEDICATION HISTORY
Admission medication history for the November 27, 2018 admission is complete.     Interview sources:  Patient, Care Everywhere ([facility] [place] - [facility] office visits)    Reliability of source: Moderate - patient confirmed olanzapine dose, but could not recall taking bupropion; verified all medications and doses with Care Everywhere    Medication compliance: Patient reports good compliance, however unable to confirm if true    Preferred Outpatient Pharmacy: [facility] [place] [place]?    Changes made to PTA medication list (reason)  Added: vitamin D 50,000 units (per Care Everywhere)  Deleted: none  Changed: updated dose and formulation of bupropion, hydroxyzine and olanzapine (per Care Everywhere and pt)    Additional medication history information:   - per [facility] [place] [place] psychiatry office visit note on 11/20/18 the provider was in the process of amending the patient's Aranda to include risperidone in place of olanzapine. Please see note for more details. The patient said she is still taking olanzapine at this time.   - bupropion - per [facility] [place] [place] notes bupropion is an active medication, however the patient could not remember every taking this medication, she said it did not sound familiar.   - vitamin D - per [facility] [place] [place] the patient's vitamin D level was 11 ng/mL on 8/17/18. Patient was prescribed vitamin D 50,000 units weekly on 9/1/18 for 12 week course. Unknown if patient was complaint to taking this as she did not mention vitamin D during the interview.     Prior to Admission Medication List:  Prior to Admission medications    Medication Sig Last Dose Taking? Auth Provider   buPROPion (WELLBUTRIN XL) 150 MG 24 hr tablet Take 150 mg by mouth daily 11/27/2018 at AM Yes Unknown, Entered By History   hydrOXYzine (ATARAX) 25 MG tablet Take 25 mg by mouth 3 times daily as needed for anxiety Past Week at prn Yes Unknown, Entered By History   OLANZapine (ZYPREXA) 10 MG tablet Take 10 mg by mouth At Bedtime (take in addition to olanzapine 5 mg every  morning) 11/26/2018 at PM Yes Unknown, Entered By History   OLANZapine (ZYPREXA) 5 MG tablet Take 5 mg by mouth every morning (take in addition to olanzapine 10 mg at bedtime) 11/27/2018 at AM Yes Unknown, Entered By History   vitamin D2 (ERGOCALCIFEROL) 82369 units (1250 mcg) capsule Take 50,000 Units by mouth every 7 days For 12 doses (started on 9/1/18). Unknown  Unknown, Entered By History       Time spent: 30 minutes    Medication history completed by:   Jacquie Joya PharmD  [facility]  Available daily from 1-9 PM: phone [phone], pager [phone]
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
## 2018-11-29 PROCEDURE — 25000128 H RX IP 250 OP 636: Performed by: NURSE PRACTITIONER

## 2018-11-29 PROCEDURE — 25000132 ZZH RX MED GY IP 250 OP 250 PS 637: Performed by: PSYCHIATRY & NEUROLOGY

## 2018-11-29 PROCEDURE — 25000132 ZZH RX MED GY IP 250 OP 250 PS 637: Performed by: NURSE PRACTITIONER

## 2018-11-29 PROCEDURE — 80061 LIPID PANEL: CPT | Performed by: PSYCHIATRY & NEUROLOGY

## 2018-11-29 PROCEDURE — 12400003 ZZH R&B MH CRITICAL UMMC

## 2018-11-29 PROCEDURE — 25000128 H RX IP 250 OP 636: Performed by: PSYCHIATRY & NEUROLOGY

## 2018-11-29 RX ORDER — OLANZAPINE 10 MG/2ML
10 INJECTION, POWDER, FOR SOLUTION INTRAMUSCULAR AT BEDTIME
Status: DISCONTINUED | OUTPATIENT
Start: 2018-11-29 | End: 2018-11-30

## 2018-11-29 RX ORDER — OLANZAPINE 5 MG/1
5 TABLET ORAL DAILY
Status: DISCONTINUED | OUTPATIENT
Start: 2018-11-29 | End: 2018-11-30

## 2018-11-29 RX ORDER — OLANZAPINE 10 MG/2ML
5 INJECTION, POWDER, FOR SOLUTION INTRAMUSCULAR DAILY
Status: DISCONTINUED | OUTPATIENT
Start: 2018-11-29 | End: 2018-11-30

## 2018-11-29 RX ORDER — OLANZAPINE 10 MG/1
10 TABLET ORAL AT BEDTIME
Status: DISCONTINUED | OUTPATIENT
Start: 2018-11-29 | End: 2018-11-30

## 2018-11-29 RX ADMIN — TRAZODONE HYDROCHLORIDE 50 MG: 50 TABLET ORAL at 22:37

## 2018-11-29 RX ADMIN — OLANZAPINE 10 MG: 10 INJECTION, POWDER, FOR SOLUTION INTRAMUSCULAR at 18:06

## 2018-11-29 RX ADMIN — OLANZAPINE 10 MG: 10 TABLET, ORALLY DISINTEGRATING ORAL at 22:37

## 2018-11-29 RX ADMIN — CLOZAPINE 25 MG: 25 TABLET, ORALLY DISINTEGRATING ORAL at 21:49

## 2018-11-29 RX ADMIN — OLANZAPINE 5 MG: 5 TABLET, FILM COATED ORAL at 10:22

## 2018-11-29 RX ADMIN — OLANZAPINE 10 MG: 10 INJECTION, POWDER, FOR SOLUTION INTRAMUSCULAR at 20:16

## 2018-11-29 ASSESSMENT — ACTIVITIES OF DAILY LIVING (ADL)
GROOMING: PROMPTS
ORAL_HYGIENE: PROMPTS;INDEPENDENT
GROOMING: PROMPTS
DRESS: SCRUBS (BEHAVIORAL HEALTH)
ORAL_HYGIENE: PROMPTS
LAUNDRY: UNABLE TO COMPLETE
DRESS: SCRUBS (BEHAVIORAL HEALTH)

## 2018-11-29 NOTE — PROGRESS NOTES
11/29/18 1300 Behavioral Health   Hallucinations denies / not responding to hallucinations   Thinking confused;distractable   Orientation person: oriented;place: oriented   Memory baseline memory   Insight denial of illness   Judgement impaired   Eye Contact at examiner   Affect tense   Mood irritable   Physical Appearance/Attire disheveled   Hygiene neglected grooming - unclean body, hair, teeth   Suicidality other (see comments)  (denies)   1. Wish to be Dead No   2. Non-Specific Active Suicidal Thoughts  No   Self Injury other (see comment)  (denies)   Elopement (no value)   Activity isolative;withdrawn   Speech clear   Medication Sensitivity no observed side effects;no stated side effects   Psychomotor / Gait balanced;steady   Activities of Daily Living   Hygiene/Grooming prompts   Oral Hygiene prompts;independent   Dress scrubs (behavioral health)   Room Organization prompts   Behavioral Health Interventions   Psychotic Symptoms maintain safety precautions;monitor need to revise level of observation;maintain safe secure environment;reality orientation;simple, clear language;decrease environmental stimulation;redirection of intrusive behaviors;redirection of aggressive behaviors;assist patient in developing safety plan;assist patient in following safety plan;encourage participation / independence with adls;provide emotional support;encourage nutrition and hydration;establish therapeutic relationship;assist with developing & utilizing healthy coping strategies;build upon strengths   Social and Therapeutic Interventions (Psychotic Symptoms) encourage socialization with peers;encourage effective boundaries with peers;encourage participation in therapeutic groups and milieu activities       Patient is calm and cooperative in her room. Pt is Isolative and withdrawn in room. Pt did not go to groups. Pt denied of any mental illness. Pt appears to be responding to internal stimuli. No SI or SIB.

## 2018-11-29 NOTE — PLAN OF CARE
Problem: Psychotic Symptoms  Goal: Psychotic Symptoms  Signs and symptoms of listed problems will be absent or manageable.   Outcome: No Change  Pt isolated to her room for most of the evening.  When RN attempted to check in with patient she was laying in bed with blankets pulled over her head. Pt had ripped up some paperwork given to her from her  and it was all over the floor.  Pt reported that she ripped it up because it was full of lies.  She went on to say that she even communicated with her sister about what she had allegedly reported, and she too said that the report was inaccurate.  Patient denies any SI or SIB, saying that she is ready to go home.  She also denies any psychotic symptoms, although appeared to be responding at times.  She also had drawn a large symbol on her one of her walls.  Patient needed a great deal of encouragement to have a blood draw from lab this evening.  She was eventually willing to do so.  Patient ate dinner and continues have poor ADLs.

## 2018-11-29 NOTE — PROGRESS NOTES
A black LG cell phone was found in pt's room while doing room-checks. Phone was placed in pt's locker.
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
## 2018-11-30 PROCEDURE — 12400003 ZZH R&B MH CRITICAL UMMC

## 2018-11-30 PROCEDURE — 25000132 ZZH RX MED GY IP 250 OP 250 PS 637: Performed by: NURSE PRACTITIONER

## 2018-11-30 PROCEDURE — 25000132 ZZH RX MED GY IP 250 OP 250 PS 637: Performed by: PSYCHIATRY & NEUROLOGY

## 2018-11-30 PROCEDURE — 25000128 H RX IP 250 OP 636: Performed by: PSYCHIATRY & NEUROLOGY

## 2018-11-30 PROCEDURE — 99233 SBSQ HOSP IP/OBS HIGH 50: CPT | Performed by: PSYCHIATRY & NEUROLOGY

## 2018-11-30 RX ORDER — OLANZAPINE 10 MG/1
20 TABLET, ORALLY DISINTEGRATING ORAL AT BEDTIME
Status: DISCONTINUED | OUTPATIENT
Start: 2018-11-30 | End: 2018-12-12

## 2018-11-30 RX ORDER — HALOPERIDOL 5 MG/ML
5 INJECTION INTRAMUSCULAR EVERY 6 HOURS PRN
Status: DISCONTINUED | OUTPATIENT
Start: 2018-11-30 | End: 2019-01-09 | Stop reason: HOSPADM

## 2018-11-30 RX ORDER — DIPHENHYDRAMINE HCL 50 MG
50 CAPSULE ORAL EVERY 6 HOURS PRN
Status: DISCONTINUED | OUTPATIENT
Start: 2018-11-30 | End: 2019-01-09 | Stop reason: HOSPADM

## 2018-11-30 RX ORDER — DIPHENHYDRAMINE HYDROCHLORIDE 50 MG/ML
50 INJECTION INTRAMUSCULAR; INTRAVENOUS EVERY 6 HOURS PRN
Status: DISCONTINUED | OUTPATIENT
Start: 2018-11-30 | End: 2019-01-09 | Stop reason: HOSPADM

## 2018-11-30 RX ORDER — LORAZEPAM 2 MG/ML
2 INJECTION INTRAMUSCULAR EVERY 6 HOURS PRN
Status: DISCONTINUED | OUTPATIENT
Start: 2018-11-30 | End: 2019-01-09 | Stop reason: HOSPADM

## 2018-11-30 RX ORDER — LORAZEPAM 2 MG/1
2 TABLET ORAL EVERY 6 HOURS PRN
Status: DISCONTINUED | OUTPATIENT
Start: 2018-11-30 | End: 2019-01-09 | Stop reason: HOSPADM

## 2018-11-30 RX ORDER — HALOPERIDOL 5 MG/1
5 TABLET ORAL EVERY 6 HOURS PRN
Status: DISCONTINUED | OUTPATIENT
Start: 2018-11-30 | End: 2019-01-09 | Stop reason: HOSPADM

## 2018-11-30 RX ORDER — OLANZAPINE 10 MG/2ML
10 INJECTION, POWDER, FOR SOLUTION INTRAMUSCULAR AT BEDTIME
Status: DISCONTINUED | OUTPATIENT
Start: 2018-11-30 | End: 2018-12-12

## 2018-11-30 RX ADMIN — OLANZAPINE 20 MG: 10 TABLET, ORALLY DISINTEGRATING ORAL at 20:22

## 2018-11-30 RX ADMIN — OLANZAPINE 5 MG: 5 TABLET, FILM COATED ORAL at 09:28

## 2018-11-30 RX ADMIN — HALOPERIDOL LACTATE 5 MG: 5 INJECTION, SOLUTION INTRAMUSCULAR at 22:16

## 2018-11-30 RX ADMIN — LORAZEPAM 2 MG: 2 INJECTION INTRAMUSCULAR; INTRAVENOUS at 22:16

## 2018-11-30 RX ADMIN — HYDROXYZINE HYDROCHLORIDE 25 MG: 25 TABLET, FILM COATED ORAL at 23:44

## 2018-11-30 RX ADMIN — TRAZODONE HYDROCHLORIDE 50 MG: 50 TABLET ORAL at 23:44

## 2018-11-30 RX ADMIN — DIPHENHYDRAMINE HYDROCHLORIDE 50 MG: 50 INJECTION, SOLUTION INTRAMUSCULAR; INTRAVENOUS at 22:16

## 2018-11-30 ASSESSMENT — ACTIVITIES OF DAILY LIVING (ADL)
DRESS: SCRUBS (BEHAVIORAL HEALTH)
GROOMING: PROMPTS
DRESS: SCRUBS (BEHAVIORAL HEALTH)
ORAL_HYGIENE: PROMPTS
ORAL_HYGIENE: PROMPTS
LAUNDRY: UNABLE TO COMPLETE
HYGIENE/GROOMING: PROMPTS
LAUNDRY: UNABLE TO COMPLETE
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
## 2018-11-30 NOTE — PROGRESS NOTES
11/29/18 1940   Restraint Type   Seclusion (BH) Discontinued   Debriefing   Debriefing DO   Does patient understand why the event happened? Yes   Does patient agree to safe behaviors? Yes   What can we do differently so this doesn't happen again? Patient refuses to answer   Plan of care reviewed and modified Yes     Patient had been escalating in seclusion secondary to being unable to use the restroom.  She had been scratching her arms with a bed pan.  She was able to deescalate with a nurse that she had a good report with.  She agreed to safe behaviors.  She was able to be calm.  She stated she would return to her room.  Seclusion was discontinued as her agitation seemed to be surrounded with having to use a bedpan in seclusion.  Patient ambulated out of seclusion with additional staff present.  She used the unit restroom.

## 2018-11-30 NOTE — PROGRESS NOTES
"Pt ate dinner quietly and then laid down, using the leg wrap as blanket. At approximately 1845 Pt then began yelling \"F**k the illuminati, F**k your pyramid, f**k the FBI, f**k the government, f**k Ay!\" Pt continues to yell these statements repeatedly while laying down.   "
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
## 2018-11-30 NOTE — PROGRESS NOTES
11/30/18 1310   Behavioral Health   Hallucinations other (see comment)  (EMILY)   Thinking delusional;paranoid   Orientation person: oriented;place: oriented   Memory baseline memory   Insight poor   Judgement impaired   Eye Contact cultural specific   Affect blunted, flat   Mood mood is calm   Physical Appearance/Attire disheveled   Hygiene neglected grooming - unclean body, hair, teeth   Suicidality other (see comments)  (Emily)   1. Wish to be Dead (EMILY)   Wish to be Dead Description (EMILY)   Self Injury (EMILY)   Activity isolative;withdrawn   Speech other (see comments)  (EMILY)   Medication Sensitivity no observed side effects   Psychomotor / Gait balanced;steady   Activities of Daily Living   Hygiene/Grooming prompts   Oral Hygiene prompts   Dress scrubs (behavioral health)   Laundry unable to complete   Room Organization prompts   Behavioral Health Interventions   Psychotic Symptoms maintain safety precautions;monitor need to revise level of observation;maintain safe secure environment;reality orientation;simple, clear language;decrease environmental stimulation;redirection of intrusive behaviors;redirection of aggressive behaviors   Social and Therapeutic Interventions (Psychotic Symptoms) encourage socialization with peers;encourage participation in therapeutic groups and milieu activities   Pt was isolative and withdrawn this shift. Pt has been sleeping all day. Behavior was calm and controlled. No other concern was noted this shift.

## 2018-11-30 NOTE — PROGRESS NOTES
"Pt was sitting quietly eating dinner when suddenly she began yelling at this writer. \"F**k you! Give them the hand signal to let me out of here! Are you part of the FBI? Am I under investigation? Let me out of here! Gang stalking is illegal!\"   "

## 2018-11-30 NOTE — PROGRESS NOTES
11/29/18 6789   Debriefing   Debriefing DO   Does patient understand why the event happened? Yes   Does patient agree to safe behaviors? Yes   What can we do differently so this doesn't happen again? Patient refuses to answer   Plan of care reviewed and modified Yes     Patient was able to remain calm.  She was able to agree to safe behaviors. Restrains were discontinued.  Patient requested PRN medications to target psychosis and help promote sleep.  She stated she would stay in her room for the rest of the evening.    She requested and accepted a snack.
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
## 2018-11-30 NOTE — PROGRESS NOTES
"   11/29/18 2140   Restraint Monitoring Q15 Minutes   Psychological Status YE   Physical Comfort D   Circulation NS   Continuous Observation Yes     Patient continues to be unable to process out of restraints. She adamantly denied that she spit on staff.  When writer told patient she needed to demonstrate safe behaviors for restraints to be discontinued she screamed \"FUCK YOU! YOU FUCKING ILLUMINATI\".  She is making many paranoid and delusional statements.  She is labile.    Minutes later she asked for her HS Clozaril, which she accepted.  Staff completed range of motion with the patient.  She was able to be calm for a few minutes, but then again became agitated.  She was able to calm with staff reassurance and support.  Restrains still indicated at this time due to impulsive and labile behavior and inability to demonstrate safe behavior.  She verbalized she understood she needed to remain calm to have the restraints discontinued.  Staff   "

## 2018-11-30 NOTE — PROGRESS NOTES
spoke with pt's [facility] [facility] worker, Anival Jordynbailee.   She said she will come visit the pt on Mon 12-3 at 3pm.    She said they (she and her supervisor) are thinking about referring pt to [facility] so pt can get placement.   Pt has to be agreeable to [facility] so that is why pt's worker is coming to visit her on Mon at 3pm.    If pt is agreeable, then the [facility] referral will be made.  The worker, Brentongeorgealexandriasaloni, said in about a week, she will be out a long time on maternity leave.   Her supervisor will then be handling the case - Radha Chinchilla at [phone].    I also spoke with Puneet WARE at [facility] [place] today letting him know that when the discharge date approaches, we will be contacting him regarding the remote Provisional Discharge.

## 2018-11-30 NOTE — PROGRESS NOTES
"With last meal that was provided Pt was given plastic spoon. At appromixately 1930 this writer observed Pt rubbing the spoon vigoriously against the ground. Per this writer's observation it appears Pt was doing this with the intent of sharpening it. Pt then began rubbing the butt end of plastic spoon against her left wrist. This writer immediately contacted RN. RN came in and requested that Pt gave up spoon. Pt complied and slid plastic spoon underneath seclusion door. Pt then stated then she needed to pass bowels (\"I need to take a shit\"). Pt was provided plastic bed pan. Pt then began rubbing plastic bed pan against left wrist. RN verbally deescalated Pt and decision was made to allow Pt use unit bathroom as long as Pt demonstrated safe behaviors. Pt verbalized agreement with multiple staff present Pt walked on her own to the unit bathroom.   "

## 2018-11-30 NOTE — PROGRESS NOTES
"   11/29/18 1945   Seclusion or Restraint Order   In Person Face to Face Assessment Conducted Yes-Eval of pt's immediate situation, reaction to intervention, complete review of systems assessment, behavioral assessment & review/assessment of hx, drugs & meds, recent labs, etc, behavioral condition, need to continue/terminate restraint/seclusion   Patient Experienced No adverse physical outcome from seclusion/restraint initiation   Describe physical sequela  none noted   Continuation of Seclus/Restraint indicated at this time Yes     Face to face assessment completed and on call provider RADHA Mensah notified of results. PT unable to cooperate with assessment screaming and threatening staff especially targeting  specifically Bulgarian staff stating \"you are all uncle toms, your slaves\". PT stated no physical injuries and no injuries were noted upon assessment. All restraints were checked for correct placement with perfusion noted in all extremities.  "

## 2018-11-30 NOTE — PROGRESS NOTES
The revocation court order was unable to be located here so I obtained a copy from an outside source.    The order reflects pt's PD was revoked on 11-28-18.   A copy was given to [facility] for filing/scanning.
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
## 2018-11-30 NOTE — PROGRESS NOTES
"   11/29/18 1805   Seclusion or Restraint Order   In Person Face to Face Assessment Conducted Yes-Eval of pt's immediate situation, reaction to intervention, complete review of systems assessment, behavioral assessment & review/assessment of hx, drugs & meds, recent labs, etc, behavioral condition, need to continue/terminate restraint/seclusion   Patient Experienced Physical sequelae  (PT reports loss of mobility of right arm )   Describe physical sequela  PT states loss unable to move right arm, Full ROM noted by RN writer   Continuation of Seclus/Restraint indicated at this time Yes     In person face to face completed and on call provider RADHA Mensah notified of results. PT reported injury to right arm stating she was unable to move her arm. RN writer asked to see her arm for bruising during the assessment she demonstrated full ROM with no noted grimacing or signs of pain. PT upon assessment appears tense and paranoid making statements about staff being the \"illumanati\" and \"the government\". PT was reassured of her safety and informed of seclusion discontinuation criteria. PT showed no evidence of learning and was unable to teach back explained content. PT at the end of the assessment was pacing the seclusion room demanding her dinner and stating \"I don't even want to go back out to the [facility] area Ill just stay in here\". Patient was given her dinner and encouraged to demonstrate calm and safe behavior.  "

## 2018-11-30 NOTE — PROGRESS NOTES
11/29/18 1900   Restraint Monitoring Q15 Minutes   Psychological Status O  (lying down quietly)   Physical Comfort D   Circulation NS   Continuous Observation Yes   Restraint Monitoring Q2 Hours   Fluids O   Food/Meal DI  (Pt stated she was still hungry, second meal provided)   Elimination D   Restraint Type   Seclusion (BH) Continued        11/29/18 1900   Restraint Monitoring Q15 Minutes   Psychological Status O  (lying down quietly)   Physical Comfort D   Circulation NS   Continuous Observation Yes   Restraint Monitoring Q2 Hours   Fluids O   Food/Meal DI  (Pt stated she was still hungry, second meal provided)   Elimination D   Restraint Type   Seclusion (BH) Continued     At approximately 1900 Pt's yelling and profanity has ceased. Pt now appears calmer. RN completed face-to-face assessment (see RN note). Pt stated she was still hungry so a second meal and fluids were provided with no further incident. Pt also stated that she needed to use the bathroom. Pt was offered bed pan per seclusion protocol but Pt declined. RN informed Pt that she will be allowed to rejoin the milieu as long as she can demonstrate safe behaviors (specifically no yelling, threatening, kicking, or spitting). Pt verbalized agreement with stated expectations. RN stated he will return shortly to talk to her with the purpose of processing her out of seclusion.  Seclusion and staff monitoring continued.

## 2018-12-01 PROCEDURE — 12400003 ZZH R&B MH CRITICAL UMMC

## 2018-12-01 PROCEDURE — 25000132 ZZH RX MED GY IP 250 OP 250 PS 637: Performed by: PSYCHIATRY & NEUROLOGY

## 2018-12-01 RX ADMIN — OLANZAPINE 20 MG: 10 TABLET, ORALLY DISINTEGRATING ORAL at 21:03

## 2018-12-01 ASSESSMENT — ACTIVITIES OF DAILY LIVING (ADL)
GROOMING: INDEPENDENT
GROOMING: INDEPENDENT
DRESS: SCRUBS (BEHAVIORAL HEALTH)
ORAL_HYGIENE: INDEPENDENT
DRESS: SCRUBS (BEHAVIORAL HEALTH);INDEPENDENT
ORAL_HYGIENE: INDEPENDENT
LAUNDRY: WITH SUPERVISION
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
## 2018-12-01 NOTE — PROGRESS NOTES
Patient is still yelling profanities at staff and very threatening verbally towards staff. Trying to spit at writer.

## 2018-12-01 NOTE — PROGRESS NOTES
Patient continued to yell at staff saying that she hoped that our kids go through horrible pain like she is. She also said that she want us all dead and that she hopes we all die so that she can laugh while she kills us. More racial remarks about being a worthless fucking cracker hillbilly with glasses. And bring that fat mother fucker back here so I can spit on him again.

## 2018-12-01 NOTE — PROGRESS NOTES
Patient slept and stayed in her room for the start of the shift until RN entered room to offer evening medications. At that time patient lunged at staff and needed to be put in five points to keep her from harming self and staff. She became very combative spitting and threatening staff and threatening harm on them if she was to be let go and in the future. This was also followed up by racist comments towards staff while in five points.

## 2018-12-01 NOTE — PROGRESS NOTES
11/30/18 6413   Debriefing   Debriefing DO     Patient agreed to safe behavior. She understand that the restraint was necessary because of her unsafe behavior, in the contest of aggression towards the staff.  Ilene did not respond when asked how the restraint could have been avoided in future. She has been provided snacks per her request.

## 2018-12-01 NOTE — PROGRESS NOTES
11/30/18 2045   Seclusion or Restraint Order   In Person Face to Face Assessment Conducted Yes-Eval of pt's immediate situation, reaction to intervention, complete review of systems assessment, behavioral assessment & review/assessment of hx, drugs & meds, recent labs, etc, behavioral condition, need to continue/terminate restraint/seclusion   Patient Experienced No adverse physical outcome from seclusion/restraint initiation   Continuation of Seclus/Restraint indicated at this time Yes   Pt refuses to respond to questions about pain or discomfort. Pt does not appear to be in any physical distress and does not appear to have any injuries. Capillary refill WNL. Pt given discontinuation criteria, however she refused to respond. All labs, medications, and physical orders reviewed. Provider notified of face to face results.
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
## 2018-12-01 NOTE — PROGRESS NOTES
The patient has been sleeping since shortly after the restraint was discontinued. She slept for about 6 hours.

## 2018-12-01 NOTE — PLAN OF CARE
Problem: Restraint/Seclusion for Violent Self-Destructive Behavior  Goal: Prevent/manage potential problems during restraint/seclusion  Maintain safety of patient and others during period of restraint/seclusion.  Promote psychological and physical wellbeing.  Prevent injury to skin and involved body parts.  Promote nutrition, hydration, and elimination.   Outcome: No Change  2335: The patient wads assessed in bed with 5-point restraint.,She was screaming and yelling but later stopped and spoke with writer. We discussed briefly the behavior that led to the restraint. We agreed on a plan that the restraint will come off once she stopped the agitation and agreed to demonstrate a safe behavior.     2350: The patient was assessed as calm and thoroughly followed direction. The restraint was discontinued. No bruises, no cardiovascular, respiratory compromise , or other problems related to the restraint. The patient took PRN Trazodone for sleep and Hydroxyzine for anxiety.     She was provided peanut butter jelly sandwich and milk and will remain calm, she promised.

## 2018-12-01 NOTE — PROGRESS NOTES
"Pt. Threatened to kill both nurses calling them \"bitches\" and \"i will send your body to your family in a box\".   "

## 2018-12-01 NOTE — PROGRESS NOTES
Pt. Stated as soon as she gets out she is jumping in to the [place] and off this building. This was repeated several times. And that they will find a way to die ether in or out of this hospital.

## 2018-12-01 NOTE — PROGRESS NOTES
"   11/30/18 2221   Restraint Monitoring Q15 Minutes   Psychological Status YE   Physical Comfort RL   Circulation NS   Continuous Observation Yes   Restraint Monitoring Q2 Hours   Range of Motion D   Food/Meal D   Elimination D     Patient has been threatening towards staff while in restraints.  She has stated \"When I get out of here I am going to kill you!\".  She continues to perseverate on the illuminati.  She is yelling so loudly it is upsetting and agitating other patients on the unit.  When staff spoke to her, she would be able to stay calm for 30 seconds, but would again start threatening staff.  She has attempted to spit at staff.  Patient has struggled while in restraints.  Writer has continued to assess the restraints for tightness and has adjusted the restraints as appropriate.      Staff attempted to readjust the patient and provide ROM, but she was unable to follow directions and was attempting to hit staff when her restraints were loosened.  Patient was given fluids, but she then spit at staff.  She threatened staff \"I hope the devil gets your children.\"    Patient remained highly agitated.  On-call provider (Dr. Boyd) was updated on the situation.  An order for additional prn medications (benadryl, haldol, and ativan) was placed.  The medications were administered to target patient's continued agitation and psychosis (see eMAR).  Patient remained in 5 point restraints.    "

## 2018-12-02 PROCEDURE — 12400003 ZZH R&B MH CRITICAL UMMC

## 2018-12-02 PROCEDURE — 25000132 ZZH RX MED GY IP 250 OP 250 PS 637: Performed by: PSYCHIATRY & NEUROLOGY

## 2018-12-02 PROCEDURE — 25000132 ZZH RX MED GY IP 250 OP 250 PS 637: Performed by: NURSE PRACTITIONER

## 2018-12-02 RX ADMIN — LORAZEPAM 2 MG: 2 TABLET ORAL at 22:02

## 2018-12-02 RX ADMIN — NICOTINE POLACRILEX 4 MG: 2 GUM, CHEWING BUCCAL at 15:16

## 2018-12-02 RX ADMIN — OLANZAPINE 20 MG: 10 TABLET, ORALLY DISINTEGRATING ORAL at 20:00

## 2018-12-02 RX ADMIN — HALOPERIDOL 5 MG: 5 TABLET ORAL at 22:03

## 2018-12-02 RX ADMIN — DIPHENHYDRAMINE HYDROCHLORIDE 50 MG: 50 CAPSULE ORAL at 22:02

## 2018-12-02 ASSESSMENT — ACTIVITIES OF DAILY LIVING (ADL)
GROOMING: PROMPTS
ORAL_HYGIENE: INDEPENDENT
DRESS: SCRUBS (BEHAVIORAL HEALTH)

## 2018-12-02 NOTE — PLAN OF CARE
Problem: Psychotic Symptoms  Goal: Psychotic Symptoms  Signs and symptoms of listed problems will be absent or manageable.   Outcome: No Change  Pt was withdrawn and isolated, sleeping for a majority of the shift.  When she did get up, she requested to and showered.  Patient denies SI/SIB/HI or any psychotic symptoms, but continues to present as extremely paranoid.  She was medication compliant and had no behavioral outbursts.

## 2018-12-03 PROCEDURE — 99232 SBSQ HOSP IP/OBS MODERATE 35: CPT | Performed by: PSYCHIATRY & NEUROLOGY

## 2018-12-03 PROCEDURE — 25000132 ZZH RX MED GY IP 250 OP 250 PS 637: Performed by: PSYCHIATRY & NEUROLOGY

## 2018-12-03 PROCEDURE — 12400003 ZZH R&B MH CRITICAL UMMC

## 2018-12-03 PROCEDURE — 99207 ZZC CDG-MDM COMPONENT: MEETS MODERATE - UP CODED: CPT | Performed by: PSYCHIATRY & NEUROLOGY

## 2018-12-03 PROCEDURE — 25000132 ZZH RX MED GY IP 250 OP 250 PS 637: Performed by: NURSE PRACTITIONER

## 2018-12-03 RX ADMIN — HALOPERIDOL 5 MG: 5 TABLET ORAL at 22:56

## 2018-12-03 RX ADMIN — LORAZEPAM 2 MG: 2 TABLET ORAL at 22:56

## 2018-12-03 RX ADMIN — OLANZAPINE 20 MG: 10 TABLET, ORALLY DISINTEGRATING ORAL at 19:09

## 2018-12-03 RX ADMIN — TRAZODONE HYDROCHLORIDE 50 MG: 50 TABLET ORAL at 22:18

## 2018-12-03 RX ADMIN — HYDROXYZINE HYDROCHLORIDE 25 MG: 25 TABLET, FILM COATED ORAL at 22:18

## 2018-12-03 RX ADMIN — DIPHENHYDRAMINE HYDROCHLORIDE 50 MG: 50 CAPSULE ORAL at 22:56

## 2018-12-03 ASSESSMENT — ACTIVITIES OF DAILY LIVING (ADL)
LAUNDRY: UNABLE TO COMPLETE
GROOMING: SHOWER;INDEPENDENT
LAUNDRY: UNABLE TO COMPLETE
DRESS: SCRUBS (BEHAVIORAL HEALTH);INDEPENDENT
ORAL_HYGIENE: INDEPENDENT
DRESS: SCRUBS (BEHAVIORAL HEALTH)
GROOMING: INDEPENDENT
ORAL_HYGIENE: INDEPENDENT

## 2018-12-03 NOTE — PLAN OF CARE
Problem: Psychotic Symptoms  Goal: Psychotic Symptoms  Signs and symptoms of listed problems will be absent or manageable.   Outcome: No Change  48 hour nursing assessment completed. Patient did shower, but has remained isolative and withdrawn to her room for the remainder of the shift. Patient presents with a blunted affect and is pleasant upon approach when speaking to this writer. Patient states she was admitted to the hospital because she believes people are following her. She states they are not following her while she is in the hospital and denies knowing of any reason to why she feels she is being followed.  Patient denies SI/SIB. Continue to monitor and assess.

## 2018-12-03 NOTE — PROGRESS NOTES
"Pt was isolative and withdrawn to her room the majority of the evening. She became irritable and tense late in the evening, made threats to staff and other Pt's. Pt used racial slurs, and cursed at staff. She paced her room yelling, \"You kalin's are trying to make me commit suicide, but guess what, I have god you mutherfucker\", \"If I commit suicide they are responsible, they are murderers\", \"They don't want to miss one minute of my life, like they are making a movie or some shit\", \"Are you writing a book about me?\", \"You put cameras in my house now you got them watching me in the [facility]\". Pt believes people are stalking her and harassing her.      12/02/18 2200   Behavioral Health   Hallucinations other (see comment)  (angelica)   Thinking paranoid;distractable;delusional   Orientation place: oriented;date: oriented   Memory other (see comment)  (angelica)   Insight denial of illness;poor   Judgement impaired   Eye Contact at floor   Affect irritable;angry   Mood irritable;anxious   Physical Appearance/Attire disheveled;untidy   Hygiene neglected grooming - unclean body, hair, teeth   1. Wish to be Dead No   2. Non-Specific Active Suicidal Thoughts  No   Activity isolative;withdrawn   Speech coherent   Medication Sensitivity no observed side effects   Psychomotor / Gait balanced;steady   Activities of Daily Living   Hygiene/Grooming prompts   Oral Hygiene independent   Dress scrubs (behavioral health)   Room Organization independent     "

## 2018-12-04 LAB
AMPHETAMINES UR QL SCN: NEGATIVE
BARBITURATES UR QL: NEGATIVE
BENZODIAZ UR QL: NEGATIVE
CANNABINOIDS UR QL SCN: POSITIVE
COCAINE UR QL: NEGATIVE
ETHANOL UR QL SCN: NEGATIVE
HCG SERPL QL: NEGATIVE
HCG UR QL: NEGATIVE
OPIATES UR QL SCN: NEGATIVE
SPECIMEN SOURCE: NORMAL
WET PREP SPEC: NORMAL

## 2018-12-04 PROCEDURE — 81025 URINE PREGNANCY TEST: CPT | Performed by: PSYCHIATRY & NEUROLOGY

## 2018-12-04 PROCEDURE — 80307 DRUG TEST PRSMV CHEM ANLYZR: CPT | Performed by: PSYCHIATRY & NEUROLOGY

## 2018-12-04 PROCEDURE — 87491 CHLMYD TRACH DNA AMP PROBE: CPT | Performed by: PHYSICIAN ASSISTANT

## 2018-12-04 PROCEDURE — 99232 SBSQ HOSP IP/OBS MODERATE 35: CPT | Performed by: PSYCHIATRY & NEUROLOGY

## 2018-12-04 PROCEDURE — 36415 COLL VENOUS BLD VENIPUNCTURE: CPT | Performed by: PSYCHIATRY & NEUROLOGY

## 2018-12-04 PROCEDURE — 84703 CHORIONIC GONADOTROPIN ASSAY: CPT | Performed by: PSYCHIATRY & NEUROLOGY

## 2018-12-04 PROCEDURE — 80320 DRUG SCREEN QUANTALCOHOLS: CPT | Performed by: PSYCHIATRY & NEUROLOGY

## 2018-12-04 PROCEDURE — 25000132 ZZH RX MED GY IP 250 OP 250 PS 637: Performed by: NURSE PRACTITIONER

## 2018-12-04 PROCEDURE — 25000132 ZZH RX MED GY IP 250 OP 250 PS 637: Performed by: PSYCHIATRY & NEUROLOGY

## 2018-12-04 PROCEDURE — 12400003 ZZH R&B MH CRITICAL UMMC

## 2018-12-04 PROCEDURE — 87210 SMEAR WET MOUNT SALINE/INK: CPT | Performed by: PHYSICIAN ASSISTANT

## 2018-12-04 PROCEDURE — 87591 N.GONORRHOEAE DNA AMP PROB: CPT | Performed by: PHYSICIAN ASSISTANT

## 2018-12-04 RX ADMIN — TRAZODONE HYDROCHLORIDE 50 MG: 50 TABLET ORAL at 22:06

## 2018-12-04 RX ADMIN — OLANZAPINE 20 MG: 10 TABLET, ORALLY DISINTEGRATING ORAL at 20:08

## 2018-12-04 ASSESSMENT — ACTIVITIES OF DAILY LIVING (ADL)
LAUNDRY: UNABLE TO COMPLETE
DRESS: SCRUBS (BEHAVIORAL HEALTH);INDEPENDENT
GROOMING: INDEPENDENT
ORAL_HYGIENE: INDEPENDENT

## 2018-12-04 NOTE — PROGRESS NOTES
"Pt was in her room most of the shift.  She met with her county  this morning + reported the visit went \"very well\".  \"She is going to help me get out of here + be able to move in with my cousin.\"  Pt denies any/all mental illness.  \"I'm ready to go.\"  "

## 2018-12-04 NOTE — PROGRESS NOTES
[facility], [place]   Psychiatric Progress Note  Hospital Day: 6        Interim History:   The patient's care was discussed with the treatment team during the daily team meeting and/or staff's chart notes were reviewed.  Staff reports that the patient remains isolative. She is very minimizing of symptoms. She appears guarded and paranoid.     Upon interview, the patient asks when she can leave. I indicate that we will need to see her out of her room and interacting given her high level of paranoia and talk about suicide (along with a history of suicide.) Patient expresses displeasure at this, claiming she doesn't like groups. I explain that she needs to be visible enough during the day that we can make a good assessment of how she is doing. If she remains in bed all the time, I have little reason to know that she is improving.     Psychiatric Symptoms: paranoia    Medication side effects reported: None    Other issues reported by patient: None         Medications:       OLANZapine zydis  20 mg Oral At Bedtime    Or     OLANZapine  10 mg Intramuscular At Bedtime          Allergies:   No Known Allergies       Labs:     Recent Results (from the past 48 hour(s))   HCG qualitative Blood    Collection Time: 12/04/18  8:20 AM   Result Value Ref Range    HCG Qualitative Serum Negative NEG^Negative          Psychiatric Examination:     /65  Pulse 92  Temp 98.2  F (36.8  C)  Resp 16  LMP 10/31/2018 (Approximate)  SpO2 95%  Weight is 0 lbs 0 oz  There is no height or weight on file to calculate BMI.    Orthostatic Vitals       Most Recent      Sitting Orthostatic /68 12/03 1605    Sitting Orthostatic Pulse (bpm) 92 12/03 1605    Standing Orthostatic /70 12/03 1605    Standing Orthostatic Pulse (bpm) 96 12/03 1605          Appearance: awake, alert, adequately groomed and dressed in hospital scrubs  Attitude:  somewhat cooperative  Eye Contact:  good  Mood:  anxious  Affect:   guarded  Speech:  clear, coherent  Language: fluent and intact in English  Psychomotor, Gait, Musculoskeletal:  no evidence of tardive dyskinesia, dystonia, or tics  Throught Process:  goal oriented  Associations:  no loose associations  Thought Content:  paranoid  Insight:  limited  Judgement:  limited  Oriented to:  time, person, and place  Attention Span and Concentration:  intact  Recent and Remote Memory:  fair  Fund of Knowledge:  adequate    Clinical Global Impressions  First:  Considering your total clinical experience with this particular patient population, how severe are the patient's symptoms at this time?: 7 (11/28/18 1001)  Compared to the patient's condition at the START of treatment, this patient's condition is:: 4 (11/28/18 1001)  Most recent:  Considering your total clinical experience with this particular patient population, how severe are the patient's symptoms at this time?: 7 (11/28/18 1001)  Compared to the patient's condition at the START of treatment, this patient's condition is:: 4 (11/28/18 1001)           Precautions:     Behavioral Orders   Procedures     Assault precautions     Code 1 - Restrict to Unit     Routine Programming     As clinically indicated     Status 15     Every 15 minutes.     Suicide precautions     Patients on Suicide Precautions should have a Combination Diet ordered that includes a Diet selection(s) AND a Behavioral Tray selection for Safe Tray - with utensils, or Safe Tray - NO utensils            Diagnoses:      1.  Schizoaffective disorder, bipolar type.   2.  Provisional diagnosis of stimulant use disorder, cocaine type.   3.  Likely cannabis use disorder, moderate to severe.   4.  Cigarette nicotine dependence, currently withdrawal.          Assessment & Plan:   Assessment and hospital summary:  28-year-old woman admitted after she called 911 due to belief she was being followed. This is a common theme for her. Initially she was quite paranoid here. Since  arrival she has mostly kept to herself, but has had some aggressive behaviors related to her paranoia. In addition, she snuck a phone onto the unit, which has not been put in her locker. She initially agreed to a trial of clozapine, but refused blood draws needed to start this, so olanzapine was continued instead.    Target psychiatric symptoms and interventions:  Psychosis  -continue Zyprexa 20 mg at night    Medical Problems and Treatments:  None acute    Behavioral/Psychological/Social:  Encourage unit programming    Disposition Plan   Reason for ongoing admission: poses an imminent risk to others and is unable to care for self due to severe psychosis or jacqueline  Discharge location: tbd  Discharge Medications: not ordered  Follow-up Appointments: not scheduled  Legal Status: full commitment with [facility] for clozapine, olanzapine, aripiprazole, lurasidone. Patient has hearing to amend Aranda on 12/17 (started by outpatient provider).  Entered by: Cy Sumner on 12/4/2018 at 3:55 PM

## 2018-12-04 NOTE — PROGRESS NOTES
Pt isolative/guarded/remained in room whole day except for breakfast/lunch to get tray, this writer introduced/greet pt in room for brief, pt stated that she's fine/denies of harming self/others/none outburst, pt calm/controlled behavior, pt was encourage to come out lounge/groups/ADLS, pt stated that she'll take shower in the evening, pt appetite/vital signs good, pt ate 100% breakfast/lunch.

## 2018-12-04 NOTE — PROGRESS NOTES
[facility], [place]   Psychiatric Progress Note  Hospital Day: 5        Interim History:   The patient's care was discussed with the treatment team during the daily team meeting and/or staff's chart notes were reviewed.  Staff reports that the patient again spit on staff over the weekend. She remains in bed much of the day then becomes more active in the evening. She told a staff member over the weekend that she believed we were trying to make her kill herself. She continues to be paranoid.    Upon interview, the patient made brief eye contact on my request. She denied feeling paranoid. She reported intention to return to live with a friend. When I asked her about her weekend, she initially reported that everything went well. When I brought up spitting on a staff member, she didn't deny it, but couldn't explain why she did it.    Psychiatric Symptoms: paranoia, agitation, aggressive behaviors    Medication side effects reported: None    Other issues reported by patient: None         Medications:       OLANZapine zydis  20 mg Oral At Bedtime    Or     OLANZapine  10 mg Intramuscular At Bedtime          Allergies:   No Known Allergies       Labs:     No results found for this or any previous visit (from the past 48 hour(s)).       Psychiatric Examination:     /65  Pulse 92  Temp 98.2  F (36.8  C)  Resp 16  LMP 10/31/2018 (Approximate)  SpO2 95%  Weight is 0 lbs 0 oz  There is no height or weight on file to calculate BMI.    Orthostatic Vitals       Most Recent      Sitting Orthostatic /68 12/03 1605    Sitting Orthostatic Pulse (bpm) 92 12/03 1605    Standing Orthostatic /70 12/03 1605    Standing Orthostatic Pulse (bpm) 96 12/03 1605            Appearance: awake, alert, adequately groomed and dressed in hospital scrubs  Attitude:  somewhat cooperative  Eye Contact:  better  Mood:  anxious  Affect:  labile  Speech:  paucity of speech  Language: fluent and intact in  English based on previous conversation  Psychomotor, Gait, Musculoskeletal:  no evidence of tardive dyskinesia, dystonia, or tics  Throught Process:  illogical  Associations:  loosening of associations present  Thought Content:  paranoid  Insight:  limited  Judgement:  limited  Oriented to:  time, person, and place  Attention Span and Concentration:  fair  Recent and Remote Memory:  fair  Fund of Knowledge:  adequate    Clinical Global Impressions  First:  Considering your total clinical experience with this particular patient population, how severe are the patient's symptoms at this time?: 7 (11/28/18 1001)  Compared to the patient's condition at the START of treatment, this patient's condition is:: 4 (11/28/18 1001)  Most recent:  Considering your total clinical experience with this particular patient population, how severe are the patient's symptoms at this time?: 7 (11/28/18 1001)  Compared to the patient's condition at the START of treatment, this patient's condition is:: 4 (11/28/18 1001)           Precautions:     Behavioral Orders   Procedures     Assault precautions     Code 1 - Restrict to Unit     Routine Programming     As clinically indicated     Status 15     Every 15 minutes.     Suicide precautions     Patients on Suicide Precautions should have a Combination Diet ordered that includes a Diet selection(s) AND a Behavioral Tray selection for Safe Tray - with utensils, or Safe Tray - NO utensils            Diagnoses:      1.  Schizoaffective disorder, bipolar type.   2.  Provisional diagnosis of stimulant use disorder, cocaine type.   3.  Likely cannabis use disorder, moderate to severe.   4.  Cigarette nicotine dependence, currently withdrawal.          Assessment & Plan:   Assessment and hospital summary:  28-year-old woman admitted after she called 911 due to belief she was being followed. This is a common theme for her. Initially she was quite paranoid here. Since arrival she has mostly kept to  herself, but has had some aggressive behaviors related to her paranoia. In addition, she snuck a phone onto the unit, which has not been put in her locker. She initially agreed to a trial of clozapine, but has refused required blood draws.    Target psychiatric symptoms and interventions:  Psychosis  -continue Zyprexa 20 mg at night    Medical Problems and Treatments:  None acute    Behavioral/Psychological/Social:  Encourage unit programming        Disposition Plan   Reason for ongoing admission: poses an imminent risk to others and is unable to care for self due to severe psychosis or jacqueline  Discharge location: tbd  Discharge Medications: not ordered  Follow-up Appointments: not scheduled  Legal Status: full commitment with [facility] for clozapine, olanzapine, aripiprazole, lurasidone  Entered by: Cy Sumner on 12/3/2018 at 11:02 PM

## 2018-12-04 NOTE — PROGRESS NOTES
"Pt agreed to have blood drawn for an exposure that occurred with staff last week, when she spit in their face.  Once the  came up to the unit, the patient saw that he was from [place] and became agitated.  She immediately refused the blood draw and started screaming that we are \"fucking with her mind,\" and \"we did this on purpose.\"  She went on to scream \"fuck the illuminati\" and refused to stop screaming, waking up other patients.  Pt given PRN benadryl, haldol, and ativan PO and asked to try to calm down in her room.  Staff will continue to monitor.   "

## 2018-12-04 NOTE — PROGRESS NOTES
Pt approached writer and reported that she is having some white vaginal discharge and that her urine is malodorous. She denies any pain or itchiness. Nursing will pass this onto the MD.

## 2018-12-05 LAB
ALBUMIN UR-MCNC: 10 MG/DL
APPEARANCE UR: CLEAR
BILIRUB UR QL STRIP: NEGATIVE
C TRACH DNA SPEC QL NAA+PROBE: NEGATIVE
COLOR UR AUTO: YELLOW
GLUCOSE UR STRIP-MCNC: NEGATIVE MG/DL
HGB UR QL STRIP: NEGATIVE
HYALINE CASTS #/AREA URNS LPF: 2 /LPF (ref 0–2)
KETONES UR STRIP-MCNC: NEGATIVE MG/DL
LEUKOCYTE ESTERASE UR QL STRIP: NEGATIVE
MUCOUS THREADS #/AREA URNS LPF: PRESENT /LPF
N GONORRHOEA DNA SPEC QL NAA+PROBE: NEGATIVE
NITRATE UR QL: NEGATIVE
PH UR STRIP: 6 PH (ref 5–7)
RBC #/AREA URNS AUTO: <1 /HPF (ref 0–2)
SOURCE: ABNORMAL
SP GR UR STRIP: 1.03 (ref 1–1.03)
SPECIMEN SOURCE: NORMAL
SPECIMEN SOURCE: NORMAL
SQUAMOUS #/AREA URNS AUTO: 13 /HPF (ref 0–1)
UROBILINOGEN UR STRIP-MCNC: NORMAL MG/DL (ref 0–2)
WBC #/AREA URNS AUTO: 1 /HPF (ref 0–5)

## 2018-12-05 PROCEDURE — 12400003 ZZH R&B MH CRITICAL UMMC

## 2018-12-05 PROCEDURE — 25000132 ZZH RX MED GY IP 250 OP 250 PS 637: Performed by: PSYCHIATRY & NEUROLOGY

## 2018-12-05 PROCEDURE — 99232 SBSQ HOSP IP/OBS MODERATE 35: CPT | Performed by: PSYCHIATRY & NEUROLOGY

## 2018-12-05 PROCEDURE — 99207 ZZC CDG-MDM COMPONENT: MEETS MODERATE - UP CODED: CPT | Performed by: PSYCHIATRY & NEUROLOGY

## 2018-12-05 PROCEDURE — 81001 URINALYSIS AUTO W/SCOPE: CPT | Performed by: NURSE PRACTITIONER

## 2018-12-05 RX ADMIN — OLANZAPINE 20 MG: 10 TABLET, ORALLY DISINTEGRATING ORAL at 19:00

## 2018-12-05 RX ADMIN — LORAZEPAM 2 MG: 2 TABLET ORAL at 20:23

## 2018-12-05 RX ADMIN — HALOPERIDOL 5 MG: 5 TABLET ORAL at 20:23

## 2018-12-05 RX ADMIN — DIPHENHYDRAMINE HYDROCHLORIDE 50 MG: 50 CAPSULE ORAL at 20:23

## 2018-12-05 ASSESSMENT — ACTIVITIES OF DAILY LIVING (ADL)
ORAL_HYGIENE: INDEPENDENT
DRESS: SCRUBS (BEHAVIORAL HEALTH)
DRESS: SCRUBS (BEHAVIORAL HEALTH)
ORAL_HYGIENE: INDEPENDENT
GROOMING: INDEPENDENT
GROOMING: INDEPENDENT

## 2018-12-05 NOTE — PROGRESS NOTES
Pt had an ok shift. She was isolative to her room for much of the room but was out in the milieu a significant amount more than previous days. When out in the milieu she was pleasant and polite with staff and peers, though still a bit guarded. At one point she was on the phone and another out of control pt grabbed the phone out of her hand yelling, and she politely backed up and did not react to threatening peer. No complaints or requests.

## 2018-12-05 NOTE — CONSULTS
"Brief Internal Medicine Note, 12/5/18:    IM consulted for vaginal itching and \"pt requests to be evaluated and treated\".  Wet prep, G&C negative.  UA/UC appears to be contaminated, but no nitrites or LE noted.  Discussed w/ RN and explained that we do not perform pelvic exams on [facility].      Medicine will sign off, no further recommendations at this time.  Please feel free to reconsult if patient's symptoms worsen or if new problems arise, or if continued complaint requires assessment by OB/Gyn.  Thank you for the opportunity to care for this patient.     Dia Montes, [facility]  Hospitalist Service   Pager: [phone]          "

## 2018-12-05 NOTE — PROGRESS NOTES
Legal:  Pt's PD had been revoked back on Nov 28th which is also the day she was admitted here.    In a separate matter, pt has a hearing on Dec 17th (Mon) at 3pm, and that is to amend the Aranda order.   Apparently pt's outpt psychiatrist (or someone other than Dr Sumner) had filed a letter to request this.    At time of discharge, we will do a local remote PD with [facility].        Pt continues to ask to leave the hospital.   Yesterday was the first time I saw pt out of her room/bed.   She said she wants to go live with her cousin in [place] at [phone].    Today, pt asks if I've called her cousin yet and I told her no because I have to talk with her [email or web address] worker about this first to make sure her plan is acceptable.   I placed a call and left a message with Radha Chinchilla, [phone].   Waiting for a return call.

## 2018-12-05 NOTE — PROGRESS NOTES
Pt is isolative to room and withdrawn from staff and peers this evening. Pt is making requests then returning to her room. Pt is cooperative, but short in answer during 1:1 check in . Pt denies SI, SIB, HI, AH, VH. No behavioral concerns to report this evening.      12/04/18 2031   Behavioral Health   Hallucinations denies / not responding to hallucinations   Thinking poor concentration   Orientation person: oriented;place: oriented   Memory baseline memory   Insight poor   Judgement impaired   Eye Contact at examiner   Affect tense   Mood anxious   Physical Appearance/Attire untidy   Hygiene neglected grooming - unclean body, hair, teeth   Suicidality other (see comments)  (none stated none observed)   1. Wish to be Dead No   2. Non-Specific Active Suicidal Thoughts  No   Self Injury other (see comment)  (none stated none observed)   Activity isolative;withdrawn   Speech clear;coherent   Medication Sensitivity no stated side effects;no observed side effects   Psychomotor / Gait steady;balanced   Behavioral Health Interventions   Psychotic Symptoms maintain safety precautions;monitor need to revise level of observation;maintain safe secure environment;provide emotional support;establish therapeutic relationship;build upon strengths;assist with developing & utilizing healthy coping strategies;monitor need for prn medication   Social and Therapeutic Interventions (Psychotic Symptoms) encourage socialization with peers;encourage effective boundaries with peers;encourage participation in therapeutic groups and milieu activities

## 2018-12-05 NOTE — PROGRESS NOTES
Patient up and attending groups. Patient asking writer about her wet prep results, which do not appear to be back yet. Patient also given specimen cup for urine and sample sent to lab for ordered UA.

## 2018-12-05 NOTE — PLAN OF CARE
Problem: Patient Care Overview  Goal: Team Discussion  Team Plan:   BEHAVIORAL TEAM DISCUSSION    Participants:  Dr Sumner, ninoska white rn, iram cardenas rn, opal payne [facility]    Progress: Very little progress.    She still appears paranoid and guarded.  She often isolates to her room.   A phone was found in her room recently.   She spit at staff last weekend.   She refused blood draws for Clozaril monitoring.   Therefore, Dr Sumner had to switch the neuroleptic she is on.    She thinks staff here is trying to get her to kill herself.    Continued Stay Criteria/Rationale: due to symptoms  Medical/Physical: per internal medicine  Precautions:   Behavioral Orders   Procedures     Assault precautions     Code 1 - Restrict to Unit     Routine Programming     As clinically indicated     Status 15     Every 15 minutes.     Suicide precautions     Patients on Suicide Precautions should have a Combination Diet ordered that includes a Diet selection(s) AND a Behavioral Tray selection for Safe Tray - with utensils, or Safe Tray - NO utensils       Plan: Meds per dr sumner.  She will need a local remote PD done with [facility] at time of discharge.  Her PD was revoked on 11-28. We will coordinate with her Co CM as to where pt will live.  She wants to leave and says she will go live with her cousin in [place].      Assist with follow up psychiatry appointment.      Rationale for change in precautions or plan: no change at this time.

## 2018-12-05 NOTE — PROGRESS NOTES
[facility], [place]   Psychiatric Progress Note  Hospital Day: 7        Interim History:   The patient's care was discussed with the treatment team during the daily team meeting and/or staff's chart notes were reviewed.  Staff reports that the patient did attend one group, but otherwise continues to isolate.    Upon interview, the patient reports that she feels ready to leave. She denies suicidal thoughts. She tells me that she doesn't feel like people are watching her here anymore. Her plan is to go to stay with her cousin. I asked if cousin would be willing to come in for a meeting and the patient reported that she has small children and cannot. She did authorize the team to speak with her cousin. I advised her that she needs to continue to be visible out of her room and go to groups.    Psychiatric Symptoms: paranoia    Medication side effects reported: None    Other issues reported by patient: None         Medications:       OLANZapine zydis  20 mg Oral At Bedtime    Or     OLANZapine  10 mg Intramuscular At Bedtime          Allergies:   No Known Allergies       Labs:     Recent Results (from the past 48 hour(s))   HCG qualitative Blood    Collection Time: 12/04/18  8:20 AM   Result Value Ref Range    HCG Qualitative Serum Negative NEG^Negative   Drug abuse screen 6 urine (chem dep) ([facility])    Collection Time: 12/04/18  5:45 PM   Result Value Ref Range    Amphetamine Qual Urine Negative NEG^Negative    Barbiturates Qual Urine Negative NEG^Negative    Benzodiazepine Qual Urine Negative NEG^Negative    Cannabinoids Qual Urine Positive (A) NEG^Negative    Cocaine Qual Urine Negative NEG^Negative    Ethanol Qual Urine Negative NEG^Negative    Opiates Qualitative Urine Negative NEG^Negative   HCG qualitative urine    Collection Time: 12/04/18  5:45 PM   Result Value Ref Range    HCG Qual Urine Negative NEG^Negative   Chlamydia trachomatis PCR    Collection Time: 12/04/18  5:45 PM   Result  Value Ref Range    Specimen Description Unspecified Urine     Chlamydia Trachomatis PCR Negative NEG^Negative   Neisseria gonorrhoea PCR    Collection Time: 12/04/18  5:45 PM   Result Value Ref Range    Specimen Descrip Unspecified Urine     N Gonorrhea PCR Negative NEG^Negative   Wet prep    Collection Time: 12/04/18  6:28 PM   Result Value Ref Range    Specimen Description Vagina     Wet Prep No motile Trichomonas seen     Wet Prep No yeast seen     Wet Prep No clue cells seen     Wet Prep Few  PMNs seen      UA with Microscopic reflex to Culture    Collection Time: 12/05/18  1:45 PM   Result Value Ref Range    Color Urine Yellow     Appearance Urine Clear     Glucose Urine Negative NEG^Negative mg/dL    Bilirubin Urine Negative NEG^Negative    Ketones Urine Negative NEG^Negative mg/dL    Specific Gravity Urine 1.028 1.003 - 1.035    Blood Urine Negative NEG^Negative    pH Urine 6.0 5.0 - 7.0 pH    Protein Albumin Urine 10 (A) NEG^Negative mg/dL    Urobilinogen mg/dL Normal 0.0 - 2.0 mg/dL    Nitrite Urine Negative NEG^Negative    Leukocyte Esterase Urine Negative NEG^Negative    Source Midstream Urine     WBC Urine 1 0 - 5 /HPF    RBC Urine <1 0 - 2 /HPF    Squamous Epithelial /HPF Urine 13 (H) 0 - 1 /HPF    Mucous Urine Present (A) NEG^Negative /LPF    Hyaline Casts 2 0 - 2 /LPF          Psychiatric Examination:     /65  Pulse 92  Temp 98  F (36.7  C) (Tympanic)  Resp 16  LMP 10/31/2018 (Approximate)  SpO2 95%  Weight is 0 lbs 0 oz  There is no height or weight on file to calculate BMI.      Orthostatic Vitals       Most Recent      Sitting Orthostatic /60 12/04 1628    Sitting Orthostatic Pulse (bpm) 79 12/04 1628    Standing Orthostatic /68 12/04 1628    Standing Orthostatic Pulse (bpm) 95 12/04 1628            Appearance: awake, alert, adequately groomed and dressed in hospital scrubs  Attitude:  cooperative  Eye Contact:  good  Mood:  anxious  Affect:  guarded  Speech:  clear,  coherent  Language: fluent and intact in English  Psychomotor, Gait, Musculoskeletal:  no evidence of tardive dyskinesia, dystonia, or tics  Throught Process:  goal oriented  Associations:  no loose associations  Thought Content:  paranoid  Insight:  limited  Judgement:  limited  Oriented to:  time, person, and place  Attention Span and Concentration:  intact  Recent and Remote Memory:  fair  Fund of Knowledge:  adequate    Clinical Global Impressions  First:  Considering your total clinical experience with this particular patient population, how severe are the patient's symptoms at this time?: 7 (11/28/18 1001)  Compared to the patient's condition at the START of treatment, this patient's condition is:: 4 (11/28/18 1001)  Most recent:  Considering your total clinical experience with this particular patient population, how severe are the patient's symptoms at this time?: 7 (11/28/18 1001)  Compared to the patient's condition at the START of treatment, this patient's condition is:: 4 (11/28/18 1001)           Precautions:     Behavioral Orders   Procedures     Assault precautions     Code 1 - Restrict to Unit     Routine Programming     As clinically indicated     Status 15     Every 15 minutes.     Suicide precautions     Patients on Suicide Precautions should have a Combination Diet ordered that includes a Diet selection(s) AND a Behavioral Tray selection for Safe Tray - with utensils, or Safe Tray - NO utensils            Diagnoses:      1.  Schizoaffective disorder, bipolar type.   2.  Provisional diagnosis of stimulant use disorder, cocaine type.   3.  Likely cannabis use disorder, moderate to severe.   4.  Cigarette nicotine dependence, currently withdrawal.          Assessment & Plan:   Assessment and hospital summary:  28-year-old woman admitted after she called 911 due to belief she was being followed. This is a common theme for her. Initially she was quite paranoid here. Since arrival she has mostly kept to  herself, but has had some aggressive behaviors related to her paranoia. In addition, she snuck a phone onto the unit, which has not been put in her locker. She initially agreed to a trial of clozapine, but refused blood draws needed to start this, so olanzapine was continued instead.    Target psychiatric symptoms and interventions:  Psychosis  -continue Zyprexa 20 mg at night    Medical Problems and Treatments:  None acute    Behavioral/Psychological/Social:  Encourage unit programming    Disposition Plan   Reason for ongoing admission: poses an imminent risk to others and is unable to care for self due to severe psychosis or jacqueline  Discharge location: tbd  Discharge Medications: not ordered  Follow-up Appointments: not scheduled  Legal Status: full commitment with [facility] for clozapine, olanzapine, aripiprazole, lurasidone. Patient has hearing to amend [employer] on 12/17 (started by outpatient provider).  Entered by: Cy Sumner on 12/5/2018 at 4:53 PM

## 2018-12-06 PROCEDURE — 25000132 ZZH RX MED GY IP 250 OP 250 PS 637: Performed by: PSYCHIATRY & NEUROLOGY

## 2018-12-06 PROCEDURE — 12400003 ZZH R&B MH CRITICAL UMMC

## 2018-12-06 PROCEDURE — 25000132 ZZH RX MED GY IP 250 OP 250 PS 637: Performed by: NURSE PRACTITIONER

## 2018-12-06 RX ADMIN — TRAZODONE HYDROCHLORIDE 50 MG: 50 TABLET ORAL at 19:04

## 2018-12-06 RX ADMIN — OLANZAPINE 20 MG: 10 TABLET, ORALLY DISINTEGRATING ORAL at 19:04

## 2018-12-06 ASSESSMENT — ACTIVITIES OF DAILY LIVING (ADL)
HYGIENE/GROOMING: INDEPENDENT
ORAL_HYGIENE: INDEPENDENT
DRESS: INDEPENDENT

## 2018-12-06 NOTE — PROGRESS NOTES
"Staff requested that patient step out of her for environmental checks.  At this time, RN writer was in the milieu and patient immediately became agitated and verbally aggressive with writer.  Pt said \"What the fuck are you looking at, why are you fucking watching me. You fucking white bitch.\"  Patient redirected and told that staff check everyone's room, patient responded \"You can't fucking tell me what to do, stupid ass bitch, I'll beat your fucking ass, I'm not scared of you.\"  Patient then started yelling at other patients and was asked to return to her room. Patient began screaming \"fuck the illuminatii!\" with in her room. PRN benadryl, ativan, and haldol given PO to patient.  Patient immediately asked other RN to not chart her behaviors and asked him to tell other staff to also not chart about her outburst.  Patient currently screaming \"Fuck the FBI, fuck the illuminati, fuck everyone, fuck all you mother fuckers.\"    "

## 2018-12-07 PROCEDURE — 99233 SBSQ HOSP IP/OBS HIGH 50: CPT | Performed by: PSYCHIATRY & NEUROLOGY

## 2018-12-07 PROCEDURE — 25000132 ZZH RX MED GY IP 250 OP 250 PS 637: Performed by: PSYCHIATRY & NEUROLOGY

## 2018-12-07 PROCEDURE — 12400003 ZZH R&B MH CRITICAL UMMC

## 2018-12-07 RX ADMIN — DIPHENHYDRAMINE HYDROCHLORIDE 50 MG: 50 CAPSULE ORAL at 16:33

## 2018-12-07 RX ADMIN — LORAZEPAM 2 MG: 2 TABLET ORAL at 16:33

## 2018-12-07 RX ADMIN — OLANZAPINE 20 MG: 10 TABLET, ORALLY DISINTEGRATING ORAL at 19:14

## 2018-12-07 RX ADMIN — HALOPERIDOL 5 MG: 5 TABLET ORAL at 16:33

## 2018-12-07 ASSESSMENT — ACTIVITIES OF DAILY LIVING (ADL)
DRESS: SCRUBS (BEHAVIORAL HEALTH)
GROOMING: INDEPENDENT
LAUNDRY: UNABLE TO COMPLETE
LAUNDRY: UNABLE TO COMPLETE
ORAL_HYGIENE: INDEPENDENT
GROOMING: INDEPENDENT
DRESS: SCRUBS (BEHAVIORAL HEALTH)
ORAL_HYGIENE: INDEPENDENT

## 2018-12-07 NOTE — PROGRESS NOTES
Pt isolative to room entire evening shift, not social with peers or staff.  Was visible for dinner and snack.  No aggression or behavioral concerns this shift.     12/06/18 2200   Behavioral Health   Hallucinations denies / not responding to hallucinations   Thinking paranoid;poor concentration   Orientation person: oriented;place: oriented;date: oriented   Memory baseline memory   Insight poor   Judgement intact   Eye Contact at examiner   Affect blunted, flat   Mood anxious;irritable   Physical Appearance/Attire untidy   Hygiene neglected grooming - unclean body, hair, teeth   1. Wish to be Dead No   2. Non-Specific Active Suicidal Thoughts  No   Activity isolative;withdrawn   Psychomotor / Gait balanced;steady   Activities of Daily Living   Hygiene/Grooming independent   Oral Hygiene independent   Dress independent   Room Organization prompts

## 2018-12-07 NOTE — PLAN OF CARE
Problem: Psychotic Symptoms  Goal: Psychotic Symptoms  Signs and symptoms of listed problems will be absent or manageable.   Outcome: No Change  48 hour nursing assessment completed. Patient remained in her room for the majority of the shift, other than to take a call from her . Patient then stood on the periphery of group. Patient calm and pleasant with writer, stating her CM told her she was making IRTS referrals and that would likely happen on Monday. Patient denies SI/SIB. Continue to encourage unit participation. Continue to monitor and assess.

## 2018-12-07 NOTE — PROGRESS NOTES
Patient was isolative to room for most of this evening. She presents as somewhat paranoid and guarded when visible in the milieu. No agitation or outburst this shift. She took scheduled medication without any issue and denied SE s. Pt approached the medication window several times this shift wanted to know if she will be discharged tomorrow.  She asked this writer not to document any negative behaviors this evening; she promised not to engage in any aggressive outburst towards staff or peers. She was briefing seen in the milieu. No SI/SIB
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
## 2018-12-07 NOTE — PROGRESS NOTES
Pt now says she will accept IRT's referrals.    I cannot make referrals today due to the condition pt was in on the evening of Dec 5th (see RN note.)  If she has a good weekend, I'll likely send referrals on Mon Dec 10th.

## 2018-12-08 PROCEDURE — 25000132 ZZH RX MED GY IP 250 OP 250 PS 637: Performed by: PSYCHIATRY & NEUROLOGY

## 2018-12-08 PROCEDURE — 12400003 ZZH R&B MH CRITICAL UMMC

## 2018-12-08 PROCEDURE — 25000132 ZZH RX MED GY IP 250 OP 250 PS 637: Performed by: NURSE PRACTITIONER

## 2018-12-08 RX ADMIN — OLANZAPINE 20 MG: 10 TABLET, ORALLY DISINTEGRATING ORAL at 19:17

## 2018-12-08 RX ADMIN — TRAZODONE HYDROCHLORIDE 50 MG: 50 TABLET ORAL at 19:17

## 2018-12-08 ASSESSMENT — ACTIVITIES OF DAILY LIVING (ADL)
DRESS: SCRUBS (BEHAVIORAL HEALTH)
DRESS: SCRUBS (BEHAVIORAL HEALTH)
ORAL_HYGIENE: PROMPTS
GROOMING: PROMPTS
GROOMING: PROMPTS
LAUNDRY: UNABLE TO COMPLETE
ORAL_HYGIENE: PROMPTS

## 2018-12-08 NOTE — PROGRESS NOTES
Patient self ingested some Sween cream and Total body shampoo (both from unit supply). Poison control called and after providing them with the product information, they stated these products are non toxic and to monitor for GI symptoms, nausea and vomiting.
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
## 2018-12-08 NOTE — PROGRESS NOTES
Pt was isolative to room and bed for entire shift. No aggression/spitting, no SI/SIB behavior this shift.

## 2018-12-09 PROCEDURE — 25000132 ZZH RX MED GY IP 250 OP 250 PS 637: Performed by: NURSE PRACTITIONER

## 2018-12-09 PROCEDURE — 25000132 ZZH RX MED GY IP 250 OP 250 PS 637: Performed by: PSYCHIATRY & NEUROLOGY

## 2018-12-09 PROCEDURE — 12400003 ZZH R&B MH CRITICAL UMMC

## 2018-12-09 RX ADMIN — OLANZAPINE 20 MG: 10 TABLET, ORALLY DISINTEGRATING ORAL at 19:17

## 2018-12-09 RX ADMIN — NICOTINE POLACRILEX 4 MG: 2 GUM, CHEWING BUCCAL at 14:43

## 2018-12-09 ASSESSMENT — ACTIVITIES OF DAILY LIVING (ADL)
ORAL_HYGIENE: INDEPENDENT
HYGIENE/GROOMING: PROMPTS
LAUNDRY: UNABLE TO COMPLETE
LAUNDRY: UNABLE TO COMPLETE
HYGIENE/GROOMING: INDEPENDENT
ORAL_HYGIENE: PROMPTS
DRESS: PROMPTS
DRESS: INDEPENDENT

## 2018-12-09 NOTE — PLAN OF CARE
"Ilene has had an unremarkable shift. She stayed in bed until 1400. Patient did have a bright affect on approach when she woke up. She made some phone calls and requested some numbers off of her phone. Patient said the only thing she wants \"is to get out of here\" and had a big smile on her face when she said it. She was cooperative and did not need any redirection today. She denies all mental health symptoms and feels she is ready to go. She has no other concerns at this time. Will continue to monitor and assess.   "

## 2018-12-09 NOTE — PROGRESS NOTES
Pt is on SIO for safety due to suicidal statements and behavior 12/7/18. She asked staff what she needs to do to get off SIO staffing. Explained to pt that she needs to show safe behavior and not have SI/SIB, and then team will consider discontinuing SIO. She had no agitation or aggressive behavior this evening. She denies SI/SIB and HI. Shows minimal insight into how her behaviors yesterday were unsafe. Withdrawn and somewhat guarded during assessment. She was compliant with scheduled medications and denies any side effects. No nausea or vomiting observed.

## 2018-12-10 PROCEDURE — 99233 SBSQ HOSP IP/OBS HIGH 50: CPT | Performed by: PSYCHIATRY & NEUROLOGY

## 2018-12-10 PROCEDURE — 12400003 ZZH R&B MH CRITICAL UMMC

## 2018-12-10 PROCEDURE — 25000132 ZZH RX MED GY IP 250 OP 250 PS 637: Performed by: PSYCHIATRY & NEUROLOGY

## 2018-12-10 PROCEDURE — 25000132 ZZH RX MED GY IP 250 OP 250 PS 637: Performed by: NURSE PRACTITIONER

## 2018-12-10 RX ORDER — OLANZAPINE 10 MG/1
10 TABLET, ORALLY DISINTEGRATING ORAL DAILY
Status: DISCONTINUED | OUTPATIENT
Start: 2018-12-11 | End: 2018-12-12

## 2018-12-10 RX ADMIN — DIPHENHYDRAMINE HYDROCHLORIDE 50 MG: 50 CAPSULE ORAL at 21:34

## 2018-12-10 RX ADMIN — LORAZEPAM 2 MG: 2 TABLET ORAL at 21:34

## 2018-12-10 RX ADMIN — OLANZAPINE 20 MG: 10 TABLET, ORALLY DISINTEGRATING ORAL at 19:41

## 2018-12-10 RX ADMIN — HALOPERIDOL 5 MG: 5 TABLET ORAL at 21:34

## 2018-12-10 RX ADMIN — HALOPERIDOL 5 MG: 5 TABLET ORAL at 00:41

## 2018-12-10 RX ADMIN — LORAZEPAM 2 MG: 2 TABLET ORAL at 00:41

## 2018-12-10 RX ADMIN — TRAZODONE HYDROCHLORIDE 50 MG: 50 TABLET ORAL at 19:41

## 2018-12-10 RX ADMIN — DIPHENHYDRAMINE HYDROCHLORIDE 50 MG: 50 CAPSULE ORAL at 00:41

## 2018-12-10 ASSESSMENT — ACTIVITIES OF DAILY LIVING (ADL)
ORAL_HYGIENE: INDEPENDENT
LAUNDRY: UNABLE TO COMPLETE
DRESS: SCRUBS (BEHAVIORAL HEALTH)
HYGIENE/GROOMING: INDEPENDENT

## 2018-12-10 NOTE — PROGRESS NOTES
[facility], [place]   Psychiatric Progress Note  Hospital Day: 12        Interim History:   The patient's care was discussed with the treatment team during the daily team meeting and/or staff's chart notes were reviewed.  Patient spit at another patient and staff members on Friday. She ended up having a code called and required seclusion. While in seclusion, she took off her bra and wrapped it around her neck. No behavioral issues the rest of the weekend. She just keeps to herself.    Upon interview, the patient asks when she can leave. She believes she had a good weekend. I remind her of going after another patient and tying her bra around her neck. She claims that this was Friday, and thus not part of the weekend. She has an intense stare. She denies medication side effects. She continues to deny psychiatric symptoms.    Psychiatric Symptoms: paranoia, aggressive behaviors, suicidal gesture a few days ago.    Medication side effects reported: None    Other issues reported by patient: None         Medications:       OLANZapine zydis  20 mg Oral At Bedtime    Or     OLANZapine  10 mg Intramuscular At Bedtime          Allergies:   No Known Allergies       Labs:     No results found for this or any previous visit (from the past 48 hour(s)).       Psychiatric Examination:     /63   Pulse 89   Temp 97.7  F (36.5  C) (Tympanic)   Resp 16   Wt 92.3 kg (203 lb 8 oz)   LMP 10/31/2018 (Approximate)   SpO2 97%   BMI 32.85 kg/m    Weight is 203 lbs 8 oz  Body mass index is 32.85 kg/m .      Orthostatic Vitals       Most Recent      Sitting Orthostatic /64 12/10 0900    Sitting Orthostatic Pulse (bpm) 60 12/10 0900    Standing Orthostatic /77 12/09 1250    Standing Orthostatic Pulse (bpm) 110 12/09 1250          Appearance: awake, alert, adequately groomed and dressed in hospital scrubs  Attitude:  somewhat cooperative  Eye Contact:  intense  Mood:  anxious  Affect:   guarded  Speech:  clear, coherent and paucity of speech  Language: fluent and intact in English  Psychomotor, Gait, Musculoskeletal:  no evidence of tardive dyskinesia, dystonia, or tics  Throught Process:  illogical  Associations:  no loose associations  Thought Content:  paranoid  Insight:  limited  Judgement:  limited  Oriented to:  time, person, and place  Attention Span and Concentration:  intact  Recent and Remote Memory:  fair  Fund of Knowledge:  adequate    Clinical Global Impressions  First:  Considering your total clinical experience with this particular patient population, how severe are the patient's symptoms at this time?: 7 (11/28/18 1001)  Compared to the patient's condition at the START of treatment, this patient's condition is:: 4 (11/28/18 1001)  Most recent:  Considering your total clinical experience with this particular patient population, how severe are the patient's symptoms at this time?: 7 (12/07/18 1155)  Compared to the patient's condition at the START of treatment, this patient's condition is:: 3 (12/07/18 1155)           Precautions:     Behavioral Orders   Procedures     Assault precautions     Code 1 - Restrict to Unit     Routine Programming     As clinically indicated     Status 15     Every 15 minutes.     Suicide precautions     Patients on Suicide Precautions should have a Combination Diet ordered that includes a Diet selection(s) AND a Behavioral Tray selection for Safe Tray - with utensils, or Safe Tray - NO utensils            Diagnoses:      1.  Schizoaffective disorder, bipolar type.   2.  Provisional diagnosis of stimulant use disorder, cocaine type.   3.  Likely cannabis use disorder, moderate to severe.   4.  Cigarette nicotine dependence, currently withdrawal.          Assessment & Plan:   Assessment and hospital summary:  28-year-old woman admitted after she called 911 due to belief she was being followed. This is a common theme for her. Initially she was quite paranoid  here. Since arrival she has mostly kept to herself, but has had some aggressive behaviors related to her paranoia. In addition, she snuck a phone onto the unit, which has not been put in her locker. She initially agreed to a trial of clozapine, but refused blood draws needed to start this, so olanzapine was continued instead. She appeared to be doing better, but has had occasional outbursts where she will spit or otherwise go after staff or other patients. Following a seclusion on 12/7 she tried to tie her bra around her neck.    Target psychiatric symptoms and interventions:  Psychosis  -continue Zyprexa 20 mg at night. Add AM dose of 10 mg.    Medical Problems and Treatments:  None acute    Behavioral/Psychological/Social:  Encourage unit programming    Patient was on 1:1 for most of the weekend. Nursing staff believes she is ready to come off. Will discontinue at this time.    Disposition Plan   Reason for ongoing admission: poses an imminent risk to self, poses an imminent risk to others and is unable to care for self due to severe psychosis or jacqueline  Discharge location: [facility]  Discharge Medications: not ordered  Follow-up Appointments: not scheduled  Legal Status: full commitment with Aranda for clozapine, olanzapine, aripiprazole, lurasidone. Patient has hearing to amend Aranda on 12/17 (started by outpatient provider).  Entered by: Cy Sumner on 12/10/2018 at 2:20 PM           PAIN

## 2018-12-10 NOTE — PROGRESS NOTES
Request for PRNs:  Patient came out of her room and reported feeling anxious, agitated, and unable to fall asleep. PRN options were discussed and pr educated. Patient agreed to take Haldol, Benadryl and Ativan. Patient went to bed with no other concerns or behavioral issues. Continue monitoring with 1:1 sitter for safety.     Addendum:  Patient was able to fall asleep after given PRNs at 12:45. Continue SIO.

## 2018-12-10 NOTE — PROGRESS NOTES
Pt's [employer] worker is now on maternity leave.   The person now covering is Amelia (ie, Kit.)   [phone].      There is a CADI screening scheduled on Fri 12-14 at 10am.    Kit, her supervisor Radha, the [employer] CADI screener (Shaniqua) will all be present with the patient.

## 2018-12-10 NOTE — PLAN OF CARE
Ilene had a pleasant evening shift. On Friday evening, she was extremely agitated and paranoid. ?She thought people in the [employer] are following her and she is under surveillance watch with the unit cameras. She walked pass another patient [facility] area, and she spits on the patient stating ?this stupid ass bitch is working for the [employer]. He works for the [employer]; he just put on the scrub so he can have access to me. She took off her bra and tied it around her neck. She took the body shampoo and Vaseline by her bed stand and drank half of it. She was placed on SIO due to these behaviors. She was pleasant this evening and focused on discharge. She asked if the doctor will discharge her tomorrow morning. She remains compliant with her scheduled medication and denied SE's.

## 2018-12-11 PROBLEM — R76.12 POSITIVE QUANTIFERON-TB GOLD TEST: Status: ACTIVE | Noted: 2018-07-23

## 2018-12-11 PROBLEM — Z91.199 NON-COMPLIANCE: Status: ACTIVE | Noted: 2018-11-20

## 2018-12-11 PROCEDURE — 25000128 H RX IP 250 OP 636: Performed by: PSYCHIATRY & NEUROLOGY

## 2018-12-11 PROCEDURE — 12400003 ZZH R&B MH CRITICAL UMMC

## 2018-12-11 PROCEDURE — 25000132 ZZH RX MED GY IP 250 OP 250 PS 637: Performed by: PSYCHIATRY & NEUROLOGY

## 2018-12-11 PROCEDURE — 99232 SBSQ HOSP IP/OBS MODERATE 35: CPT | Performed by: PSYCHIATRY & NEUROLOGY

## 2018-12-11 RX ADMIN — DIPHENHYDRAMINE HYDROCHLORIDE 50 MG: 50 INJECTION, SOLUTION INTRAMUSCULAR; INTRAVENOUS at 17:28

## 2018-12-11 RX ADMIN — OLANZAPINE 10 MG: 10 TABLET, ORALLY DISINTEGRATING ORAL at 08:49

## 2018-12-11 RX ADMIN — LORAZEPAM 2 MG: 2 INJECTION INTRAMUSCULAR; INTRAVENOUS at 17:28

## 2018-12-11 RX ADMIN — HALOPERIDOL LACTATE 5 MG: 5 INJECTION, SOLUTION INTRAMUSCULAR at 17:28

## 2018-12-11 RX ADMIN — OLANZAPINE 20 MG: 10 TABLET, ORALLY DISINTEGRATING ORAL at 20:46

## 2018-12-11 ASSESSMENT — ACTIVITIES OF DAILY LIVING (ADL)
ORAL_HYGIENE: INDEPENDENT
LAUNDRY: UNABLE TO COMPLETE
DRESS: SCRUBS (BEHAVIORAL HEALTH)
HYGIENE/GROOMING: PROMPTS
WHICH_OF_THE_ABOVE_FUNCTIONAL_RISKS_HAD_A_RECENT_ONSET_OR_CHANGE?: COGNITION

## 2018-12-11 NOTE — PROGRESS NOTES
Hearing 12-17 is both to amend the Aranda and to extend the commitment.       Original Aranda:  Clozaril, Zyprexa, Abilify, Latuda.   Pt's outpt psychiatrist, Dr Angela salamanca of [facility] asked for an amendment which is to drop the Zyprexa and add Risperdal.  I informed dr hull of this and said now is an opportunity for him to give his input on which meds he would choose to have on the Aranda order.

## 2018-12-11 NOTE — PROGRESS NOTES
"Pt appeared to be having a good shift, was still isolative and withdrawn, but was pleasant upon approach and polite, until about 2130 when she became highly agitated. Pt was exiting the bathroom in the [facility] as another pt was entering. Pt took this to mean this pt was following her and began yelling in her room, \"Fuck the illuminati! Fuck the illuminati! Fuck the FBI and the [employer]IL[employer]! They are trying to kill me!\" and \"They have nothing better to do than to follow us around and make us go crazy!\" and \"If I'm so crazy, why am I making so much sense?\" and \"I don't have a mental illness! They trying to make it seem like I'm crazy!\". After several attempts at redirection and a PRN medication, pt has now stopped yelling in her room. Writer was unable to directly assess SI/SIB/HI risk at this time because of pt's agitation, however no signs of SI/SIB were noted during this shift. Pt took a shower this evening. No reports of AVH, however there is reason to believe she is experiencing delusions. No seclusions or restraints have been utilized at the time of this note (2215).   "

## 2018-12-11 NOTE — PROVIDER NOTIFICATION
"   12/11/18 1730   Justification   Clinical Justification All   Pt requested that her EBT card/money be brought up from security so that she could send it with her visitor. Assigned RN and charge RN explained to patient that this needs to be approved by the team for her safety. Pt abruptly became agitated, threatening to kill staff and started pounding on the med room window. Verbal de-escalation attempted, however pt continued to escalate, yelling \"I'll fucking knock your teeth out bitch!\" to staff. Code 21 called and pt was offered medications. She continued to scream threats at staff. [facility] team placed pt in 5 point restraints due to continued aggressive and threatening behavior. Pt is unable to safely be in seclusion due to SIB. Pt given Benadryl, Haldol, and Ativan IMs. During entirey of code, pt was spitting at staff and screaming threats at staff \"as soon as I'm out of here, you better believe you're dead!\" \"I'm gonna fucking kill you!\" \"Fuck the illuminati!\" \"Let's all go kill ourselves! Kill yourself! Kill yourself!\"   "

## 2018-12-11 NOTE — PROVIDER NOTIFICATION
12/11/18 1730   Seclusion or Restraint Order   In Person Face to Face Assessment Conducted Yes-Eval of pt's immediate situation, reaction to intervention, complete review of systems assessment, behavioral assessment & review/assessment of hx, drugs & meds, recent labs, etc, behavioral condition, need to continue/terminate restraint/seclusion   Patient was placed in 5 point restraints without incident. She was very hostile and resistive to staff directives. She was threatening violence and made threats to physically hurt unit staff. Patient will be monitored on 1:1 Staff at this time. MD notified.

## 2018-12-11 NOTE — PROGRESS NOTES
[facility], [place]   Psychiatric Progress Note  Hospital Day: 13        Interim History:   The patient's care was discussed with the treatment team during the daily team meeting and/or staff's chart notes were reviewed.  Patient was agitated yesterday, talking about he illuminati and making other paranoid statements. She was redirectable to her room.    Upon interview, the patient does endorse feeling paranoid again. She denies medication side effects.    Psychiatric Symptoms: paranoia, aggressive behaviors, suicidal gesture a few days ago.    Medication side effects reported: None    Other issues reported by patient: None         Medications:       OLANZapine zydis  20 mg Oral At Bedtime    Or     OLANZapine  10 mg Intramuscular At Bedtime     OLANZapine zydis  10 mg Oral Daily          Allergies:   No Known Allergies       Labs:     No results found for this or any previous visit (from the past 48 hour(s)).       Psychiatric Examination:     /63   Pulse 89   Temp 97.7  F (36.5  C) (Tympanic)   Resp 16   Wt 92.3 kg (203 lb 8 oz)   LMP 10/31/2018 (Approximate)   SpO2 97%   BMI 32.85 kg/m    Weight is 203 lbs 8 oz  Body mass index is 32.85 kg/m .      Orthostatic Vitals       Most Recent      Sitting Orthostatic /64 12/10 0900    Sitting Orthostatic Pulse (bpm) 60 12/10 0900            Appearance: awake, alert, adequately groomed and dressed in hospital scrubs  Attitude:  somewhat cooperative  Eye Contact:  intense  Mood:  anxious  Affect:  guarded  Speech:  clear, coherent and paucity of speech  Language: fluent and intact in English  Psychomotor, Gait, Musculoskeletal:  no evidence of tardive dyskinesia, dystonia, or tics  Throught Process:  illogical  Associations:  no loose associations  Thought Content:  paranoid  Insight:  limited  Judgement:  limited  Oriented to:  time, person, and place  Attention Span and Concentration:  intact  Recent and Remote Memory:   fair  Fund of Knowledge:  adequate    Clinical Global Impressions  First:  Considering your total clinical experience with this particular patient population, how severe are the patient's symptoms at this time?: 7 (11/28/18 1001)  Compared to the patient's condition at the START of treatment, this patient's condition is:: 4 (11/28/18 1001)  Most recent:  Considering your total clinical experience with this particular patient population, how severe are the patient's symptoms at this time?: 7 (12/07/18 1155)  Compared to the patient's condition at the START of treatment, this patient's condition is:: 3 (12/07/18 1155)           Precautions:     Behavioral Orders   Procedures     Assault precautions     Code 1 - Restrict to Unit     Routine Programming     As clinically indicated     Status 15     Every 15 minutes.     Suicide precautions     Patients on Suicide Precautions should have a Combination Diet ordered that includes a Diet selection(s) AND a Behavioral Tray selection for Safe Tray - with utensils, or Safe Tray - NO utensils            Diagnoses:      1.  Schizoaffective disorder, bipolar type. (F25.0)  2.  Provisional diagnosis of stimulant use disorder, cocaine type.  (F14.10)  3.  Likely cannabis use disorder, moderate to severe. (F12.20)  4.  Cigarette nicotine dependence, currently withdrawal. (F17.213)         Assessment & Plan:   Assessment and hospital summary:  28-year-old woman admitted after she called 911 due to belief she was being followed. This is a common theme for her. Initially she was quite paranoid here. Since arrival she has mostly kept to herself, but has had some aggressive behaviors related to her paranoia. In addition, she snuck a phone onto the unit, which has not been put in her locker. She initially agreed to a trial of clozapine, but refused blood draws needed to start this, so olanzapine was continued instead. She appeared to be doing better, but has had occasional outbursts where  she will spit or otherwise go after staff or other patients. Following a seclusion on 12/7 she tried to tie her bra around her neck. She has remained paranoid since then.    Target psychiatric symptoms and interventions:  Psychosis  -continue Zyprexa 20 mg at night and 10 mg in AM (increased on 12/11).    Medical Problems and Treatments:  None acute    Behavioral/Psychological/Social:  Encourage unit programming      Disposition Plan   Reason for ongoing admission: poses an imminent risk to self, poses an imminent risk to others and is unable to care for self due to severe psychosis or jacqueline  Discharge location: [facility] facility  Discharge Medications: not ordered  Follow-up Appointments: not scheduled  Legal Status: full commitment with Aranda for clozapine, olanzapine, aripiprazole, lurasidone. Patient has hearing to amend Aranda on 12/17 (started by outpatient provider).  Entered by: Cy Sumner on 12/11/2018 at 12:12 PM

## 2018-12-11 NOTE — PROVIDER NOTIFICATION
12/10/18 1840   Behavioral Health   Suicidality (WDL) ex   Suicidality other (see comments)  (denies)   1. Wish to be Dead No   2. Non-Specific Active Suicidal Thoughts  No   3. Active Sucidal Ideation with any Methods (Not Plan) Without Intent to Act  No   4. Active Suicidal Ideation with Some Intent to Act, Without Specific Plan  No   5. Active Suicidal Ideation with Specific Plan and Intent  No   Duration (Lifetime) NA   Change in Protective Factors? No   Enviromental Risk Factors None     SIO discontinued at 13:44. Safety assessment completed. Pt denies SI/SIB thoughts or urges. No observed safety concerns at this time. Remains with limited linens, per pt care order. Team and attending to reassess tomorrow.

## 2018-12-11 NOTE — PROGRESS NOTES
"Pt reports ongoing vaginal discharge. Describes heavy, white, and \"chunky\" vaginal fluid which is malodorous and abnormal for her. Reports persistent discomfort and mild itching. Denies bleeding, pain during urination, or other gynecological symptoms.    Per chart review, medicine has previously been consulted, though wet prep and G&C were negative; medicine signed off at that time without further intervention (see IM note dated 12/5/18). Pt requesting concerns and discomfort be addressed. Will pass on to team in RN report to Dr. Sumner for follow up.  "

## 2018-12-11 NOTE — PROGRESS NOTES
"Pt became acutely agitated after a male peer used the bathroom after her. She began to express paranoid and delusional beliefs that she is being followed, targeted and that her life is in danger. She screamed in her room repeatedly \"fuck the illuminati!\", \"fucking kill them all! They are gonna kill me! I am afraid to die!\" She was offered PRN medication which she initially declined but later accepted with encouragement (see eMAR for details). She continued to scream in her room despite reality orientation, verbal de-escalation and reassurance and support. She feels staff is \"lying to her\" and \"all of the staff are illuminati and the patients are not real patients.\" She began to bang on the walls of her room with her fists. Her screaming, swearing and vague threats to \"go after the illuminati\" became disruptive to the point of waking other patients who became upset by the content, volume and profanity of her language. She was again offered reassurance and support by staff, and firmly redirected from making threats. She is now resting on her bed, though remains yelling intermittently. Staff have increased frequency of rounding with variable time intervals to ensure safety. Will continue to monitor closely.   "

## 2018-12-11 NOTE — PROGRESS NOTES
"Pt has been placed in restraints and is threating to kill the  RN pt is stating that she is going to kill the staff and the RN when she is release from restraints and that she didn't care how long it will be but she will do it. .Pt is screaming \"KILL YOUR SELF\".   "

## 2018-12-12 PROCEDURE — 12400003 ZZH R&B MH CRITICAL UMMC

## 2018-12-12 PROCEDURE — 25000128 H RX IP 250 OP 636: Performed by: PSYCHIATRY & NEUROLOGY

## 2018-12-12 PROCEDURE — 25000132 ZZH RX MED GY IP 250 OP 250 PS 637: Performed by: PSYCHIATRY & NEUROLOGY

## 2018-12-12 PROCEDURE — 99233 SBSQ HOSP IP/OBS HIGH 50: CPT | Performed by: PSYCHIATRY & NEUROLOGY

## 2018-12-12 PROCEDURE — 25000132 ZZH RX MED GY IP 250 OP 250 PS 637: Performed by: NURSE PRACTITIONER

## 2018-12-12 RX ORDER — HALOPERIDOL 5 MG/ML
5 INJECTION INTRAMUSCULAR 2 TIMES DAILY
Status: DISCONTINUED | OUTPATIENT
Start: 2018-12-12 | End: 2019-01-09 | Stop reason: HOSPADM

## 2018-12-12 RX ORDER — HALOPERIDOL 5 MG/1
5 TABLET ORAL 2 TIMES DAILY
Status: DISCONTINUED | OUTPATIENT
Start: 2018-12-12 | End: 2019-01-09 | Stop reason: HOSPADM

## 2018-12-12 RX ADMIN — HALOPERIDOL LACTATE 5 MG: 5 INJECTION, SOLUTION INTRAMUSCULAR at 14:08

## 2018-12-12 RX ADMIN — TRAZODONE HYDROCHLORIDE 50 MG: 50 TABLET ORAL at 19:51

## 2018-12-12 RX ADMIN — DIPHENHYDRAMINE HYDROCHLORIDE 50 MG: 50 INJECTION, SOLUTION INTRAMUSCULAR; INTRAVENOUS at 14:08

## 2018-12-12 RX ADMIN — LORAZEPAM 2 MG: 2 INJECTION INTRAMUSCULAR; INTRAVENOUS at 14:08

## 2018-12-12 RX ADMIN — HALOPERIDOL 5 MG: 5 TABLET ORAL at 19:51

## 2018-12-12 RX ADMIN — OLANZAPINE 10 MG: 10 TABLET, ORALLY DISINTEGRATING ORAL at 09:09

## 2018-12-12 ASSESSMENT — ACTIVITIES OF DAILY LIVING (ADL)
HYGIENE/GROOMING: PROMPTS
LAUNDRY: UNABLE TO COMPLETE
DRESS: SCRUBS (BEHAVIORAL HEALTH)
ORAL_HYGIENE: PROMPTS

## 2018-12-12 NOTE — PROVIDER NOTIFICATION
12/11/18 1828   Debriefing   Debriefing DO   Does patient understand why the event happened? Yes   Does patient agree to safe behaviors? Yes   What can we do differently so this doesn't happen again? Other (comment)  (identified coping skills to use)   Plan of care reviewed and modified Yes   Pt acknowledged that threatening to harm staff is unacceptable and agreed to refrain from those behaviors. Pt verbalized that she will use coping skills when angry or frustrated (listening to music) instead of screaming or threatening others. Pt showed RN 20 minutes of calm behavior per agreement, and restraints were discontinued. Pt was given meal tray and verbalizes understanding of safety plan for this evening.

## 2018-12-12 NOTE — PROGRESS NOTES
BEHAVIORAL TEAM DISCUSSION    Participants:    Dr hull, leonard white rn, opal payne [facility]  Progress:    Poor to sporadic.     At times she is come and reasonable and her thoughts seem fairly clear.  Other times, she has very intense verbal outbursts.     During times of outbursts, she is very hostile, threatens to kill people, won't take directives from staff, is delusional.       Continued Stay Criteria/Rationale:   Due to symptoms.  Medical/Physical:  Per internal medicine.  Precautions:   Behavioral Orders   Procedures     Assault precautions     Code 1 - Restrict to Unit     Routine Programming     As clinically indicated     Status 15     Every 15 minutes.     Suicide precautions     Patients on Suicide Precautions should have a Combination Diet ordered that includes a Diet selection(s) AND a Behavioral Tray selection for Safe Tray - with utensils, or Safe Tray - NO utensils       Plan: meds per dr hull.    Pt has a CADI screening this Friday at 10am.    She has a hearing on Mon the 17th at 2:15pm to amend the Aranda and to extend the commitment.  Rationale for change in precautions or plan: no change at this time.

## 2018-12-12 NOTE — PROGRESS NOTES
Visited with pt on the basis of spiritual support for the pt.  Reflected with pt around her hospital experience, sources of spiritual and emotional support and current spiritual health needs. Pt talked about her current situation and what it means for her. During my conversation with her, I let her know that I could be support to her during her hospitalization. . I encouraged her to see God as God of love, compassion and mercy full.     Emotional support. Reflective conversation integrating illness elements and family spiritual narratives. I shared conversation that would invite God into the room and to bless those present, support them in their suffering. Active listening with the patient was used. Coping skills were suggested to help patient.  Offered alternative ways to view her vanesa. Offered methods to see the power of a loving God. Offered alternative ways to view her own suffering and see how vanesa can be a powerful source of strength. I encouraged her to trust medical staff. I provided a special prayer asking God to help and ease and eliminate any suffering and pain that she feels.     Pt received spiritual support and reflective conversation in the context of this hospitalization.  Pt expressed appreciation for the visit and the encouragement she felt that she has God s support in her struggles that God knows of her suffering.     Will continue to provide support to pt/family during their hospitalization at least 1x/wk

## 2018-12-12 NOTE — PROVIDER NOTIFICATION
12/12/18 1510   Debriefing   Debriefing DO   Does patient understand why the event happened? Yes   Does patient agree to safe behaviors? Yes   What can we do differently so this doesn't happen again? Other (comment)  (talk to staff, ask for prn, read, listen to music)   Plan of care reviewed and modified Yes   Pt is visibly calmer and has shown calm behavior for 20 minutes per agreement with writer. Pt verbalizes that she can remain calm this evening and refrain from harming herself or others. Denies SI/SIB currently and agrees to safety plan. Restraints discontinued at this time.

## 2018-12-12 NOTE — SIGNIFICANT EVENT
"Pt. In 5 pt restraints for continuous head banging against walls in pt room.  RN entered room to check on pt, pt states \" get out of my room you stupid blonde hair red neck cracker ass bitch.\"    "

## 2018-12-12 NOTE — PROVIDER NOTIFICATION
12/12/18 1350   Justification   Clinical Justification All   Without provocation, pt started throwing food against her room wall. She then rapidly walked out of her room and attempted to push over the vitals machine. Duress beeper initiated. She walked back to her room and started forcefully hitting her head on the window. Verbal de-escalation attempted, which was unsuccessful as pt continued to engage in SIB. [facility]CS team safely placed pt in 5 point restraints due to continued agitation and self injurious behaviors. Pt received Haldol 5 mg, Ativan 2 mg, and Benadryl 50 mg IM. Dr Sumner notified and order for restraints received. During initiation of restraints, pt threatening to kill staff and was continually spitting at staff.

## 2018-12-12 NOTE — PLAN OF CARE
Pt had a significant period of agitation this evening regarding a request, which resulted in a restraint episode. Pt was repeatedly threatening to kill staff once restraints were discontinued. Emergency prn medications appeared to be helpful and patient was able to agree to safe behaviors after about an hour. While agitated, pt was making many delusional sounding statements about the illuminati and government. When pt is calmer, she acknowledges that her aggressive behavior is unacceptable. She denies SI/SIB and HI and states she will be safe. No further delusional or paranoid statements observed this evening. No further agitation. She was compliant with HS medications and denies any side effects. Will continue to monitor closely.

## 2018-12-12 NOTE — PROVIDER NOTIFICATION
12/12/18 1350   Seclusion or Restraint Order   In Person Face to Face Assessment Conducted Yes-Eval of pt's immediate situation, reaction to intervention, complete review of systems assessment, behavioral assessment & review/assessment of hx, drugs & meds, recent labs, etc, behavioral condition, need to continue/terminate restraint/seclusion   Patient Experienced No adverse physical outcome from seclusion/restraint initiation   Continuation of Seclus/Restraint indicated at this time Yes     Writer performed face to face assessment. Pt does not appear to have any injuries or appear to be in any physical distress. Will continue to monitor for any neurological changes or pain related to head banging. MD notified of face to face results. Restraints continued at this time.

## 2018-12-13 PROCEDURE — 25000132 ZZH RX MED GY IP 250 OP 250 PS 637: Performed by: PSYCHIATRY & NEUROLOGY

## 2018-12-13 PROCEDURE — 12400003 ZZH R&B MH CRITICAL UMMC

## 2018-12-13 RX ADMIN — HALOPERIDOL 5 MG: 5 TABLET ORAL at 10:02

## 2018-12-13 RX ADMIN — HALOPERIDOL 5 MG: 5 TABLET ORAL at 22:50

## 2018-12-13 RX ADMIN — DIPHENHYDRAMINE HYDROCHLORIDE 50 MG: 50 CAPSULE ORAL at 22:49

## 2018-12-13 RX ADMIN — LORAZEPAM 2 MG: 2 TABLET ORAL at 22:50

## 2018-12-13 RX ADMIN — HALOPERIDOL 5 MG: 5 TABLET ORAL at 19:19

## 2018-12-13 ASSESSMENT — ACTIVITIES OF DAILY LIVING (ADL)
ORAL_HYGIENE: PROMPTS
ORAL_HYGIENE: INDEPENDENT;PROMPTS
DRESS: SCRUBS (BEHAVIORAL HEALTH)
LAUNDRY: UNABLE TO COMPLETE
HYGIENE/GROOMING: PROMPTS
DRESS: SCRUBS (BEHAVIORAL HEALTH)
HYGIENE/GROOMING: INDEPENDENT;PROMPTS

## 2018-12-13 NOTE — PROGRESS NOTES
Pt had a significant period of dysregulation this afternoon in which she started forcefully hitting her fists on the med room windows and banging her head. She appeared to improve after receiving prns Haldol, Benadryl, and Ativan while in restraints. No further episodes of agitation observed this evening. She denies SI/SIB and continues to agree to safety plan. She was calm and cooperative. She approached writer and requested HS medications. Denies any side effects from medications.

## 2018-12-13 NOTE — PLAN OF CARE
Pt continues to have symptoms of psychosis. Pt is showing minimal improvement at this time. She is isolative and withdrawn this shift. Pt is's affect is flat and her mood is calm. Pt is avoidant with eye contact and remained in her room for the majority of the shift. Pt was generally uncooperative with check in and denying of symptoms. Pt's psychosis symptoms are generally negative in nature this shift. Pt reported no physical health symptoms or side effects from medication.

## 2018-12-14 PROCEDURE — 25000132 ZZH RX MED GY IP 250 OP 250 PS 637: Performed by: PSYCHIATRY & NEUROLOGY

## 2018-12-14 PROCEDURE — 12400003 ZZH R&B MH CRITICAL UMMC

## 2018-12-14 PROCEDURE — G0177 OPPS/PHP; TRAIN & EDUC SERV: HCPCS

## 2018-12-14 PROCEDURE — 99232 SBSQ HOSP IP/OBS MODERATE 35: CPT | Performed by: PSYCHIATRY & NEUROLOGY

## 2018-12-14 RX ADMIN — LORAZEPAM 2 MG: 2 TABLET ORAL at 19:40

## 2018-12-14 RX ADMIN — HALOPERIDOL 5 MG: 5 TABLET ORAL at 08:44

## 2018-12-14 RX ADMIN — DIPHENHYDRAMINE HYDROCHLORIDE 50 MG: 50 CAPSULE ORAL at 19:41

## 2018-12-14 RX ADMIN — HALOPERIDOL 5 MG: 5 TABLET ORAL at 19:41

## 2018-12-14 ASSESSMENT — ACTIVITIES OF DAILY LIVING (ADL)
DRESS: SCRUBS (BEHAVIORAL HEALTH);INDEPENDENT
ORAL_HYGIENE: INDEPENDENT
HYGIENE/GROOMING: INDEPENDENT
HYGIENE/GROOMING: INDEPENDENT
DRESS: SCRUBS (BEHAVIORAL HEALTH)
LAUNDRY: UNABLE TO COMPLETE
ORAL_HYGIENE: INDEPENDENT

## 2018-12-14 NOTE — PROGRESS NOTES
Patient was isolative to her room throughout the shift. At times pt can be heard responding while on her room ; however pt denies having any visual or auditory hallucinations. Pt's moods was calm and approachable. She denies any SI/SIB.        12/13/18 2100   Behavioral Health   Hallucinations appears responding   Thinking distractable;poor concentration   Orientation person: oriented;place: oriented   Memory baseline memory   Insight poor   Judgement impaired   Affect tense   Mood mood is calm   Physical Appearance/Attire neat;other (see comment)  (pt showered)   Hygiene well groomed;other (see comment)  (pt showered )   Suicidality other (see comments)  (pt denies)   1. Wish to be Dead No   2. Non-Specific Active Suicidal Thoughts  No   Self Injury other (see comment)  (pt denies)   Activity isolative;withdrawn   Speech clear;coherent   Medication Sensitivity no stated side effects;no observed side effects   Psychomotor / Gait balanced;steady   Activities of Daily Living   Hygiene/Grooming independent;prompts   Oral Hygiene independent;prompts   Dress scrubs (behavioral health)   Laundry unable to complete   Room Organization prompts

## 2018-12-14 NOTE — PROGRESS NOTES
"Unprovoked, Pt runs out of her room, yelling at staff, \"they are looking in on me, bothering me\" pt appearing to be responding to internal stimuli, unable to engage with staff attempting to provide reassurance; after second or third time patient came out of her room, writer gives, prn haldol, ativan and benadryl; will monitor closely.  "

## 2018-12-14 NOTE — PROGRESS NOTES
12/14/18 4380   Dance Movement Therapy   Type of Intervention structured groups   Response participates with encouragement   Hours 1     Pt movement was fluid and organized.  She was socially responsive and appropriately interactive.

## 2018-12-15 PROCEDURE — 25000132 ZZH RX MED GY IP 250 OP 250 PS 637: Performed by: PSYCHIATRY & NEUROLOGY

## 2018-12-15 PROCEDURE — 12400003 ZZH R&B MH CRITICAL UMMC

## 2018-12-15 RX ADMIN — HALOPERIDOL 5 MG: 5 TABLET ORAL at 12:40

## 2018-12-15 RX ADMIN — DIPHENHYDRAMINE HYDROCHLORIDE 50 MG: 50 CAPSULE ORAL at 19:18

## 2018-12-15 RX ADMIN — HALOPERIDOL 5 MG: 5 TABLET ORAL at 19:18

## 2018-12-15 ASSESSMENT — ACTIVITIES OF DAILY LIVING (ADL)
HYGIENE/GROOMING: INDEPENDENT;PROMPTS
LAUNDRY: UNABLE TO COMPLETE
ORAL_HYGIENE: INDEPENDENT;PROMPTS
DRESS: SCRUBS (BEHAVIORAL HEALTH);INDEPENDENT

## 2018-12-15 NOTE — PROGRESS NOTES
"[facility], [place]   Psychiatric Progress Note  Hospital Day: 16        Interim History:   The patient's care was discussed with the treatment team during the daily team meeting and/or staff's chart notes were reviewed.  Patient ran out of her room yelling that people were looking at her last night. She appears to be responding to internal stimuli.    Upon interview, the patient is wide eyed. She claims that the haldol is \"working better\" but is unable to say how. She continues to deny all symptoms but appears very paranoid.    Psychiatric Symptoms: paranoia, internal stimuli.    Medication side effects reported: None    Other issues reported by patient: None         Medications:       haloperidol  5 mg Oral BID    Or     haloperidol lactate  5 mg Intramuscular BID          Allergies:   No Known Allergies       Labs:     No results found for this or any previous visit (from the past 48 hour(s)).       Psychiatric Examination:     /74   Pulse 91   Temp 97.6  F (36.4  C) (Tympanic)   Resp 16   Wt 92.3 kg (203 lb 8 oz)   LMP 10/31/2018 (Approximate)   SpO2 96%   BMI 32.85 kg/m    Weight is 203 lbs 8 oz  Body mass index is 32.85 kg/m .      Orthostatic Vitals       Most Recent      Sitting Orthostatic /65 12/13 1500    Sitting Orthostatic Pulse (bpm) 97 12/13 1500    Standing Orthostatic /56 12/13 1500    Standing Orthostatic Pulse (bpm) 107 12/13 1500            Appearance: awake, alert, adequately groomed and dressed in hospital scrubs  Attitude:  cooperative  Eye Contact:  intense  Mood:  anxious  Affect:  guarded  Speech:  clear, coherent and normal prosody  Language: fluent and intact in English  Psychomotor, Gait, Musculoskeletal:  no evidence of tardive dyskinesia, dystonia, or tics  Throught Process:  illogical  Associations:  no loose associations  Thought Content:  paranoid, persecutory delusions, hallucinations  Insight:  limited  Judgement:  " limited  Oriented to:  time, person, and place  Attention Span and Concentration:  intact  Recent and Remote Memory:  fair  Fund of Knowledge:  adequate    Clinical Global Impressions  First:  Considering your total clinical experience with this particular patient population, how severe are the patient's symptoms at this time?: 7 (11/28/18 1001)  Compared to the patient's condition at the START of treatment, this patient's condition is:: 4 (11/28/18 1001)  Most recent:  Considering your total clinical experience with this particular patient population, how severe are the patient's symptoms at this time?: 7 (12/07/18 1155)  Compared to the patient's condition at the START of treatment, this patient's condition is:: 3 (12/07/18 1155)           Precautions:     Behavioral Orders   Procedures     Assault precautions     Code 1 - Restrict to Unit     Routine Programming     As clinically indicated     Status 15     Every 15 minutes.     Suicide precautions     Patients on Suicide Precautions should have a Combination Diet ordered that includes a Diet selection(s) AND a Behavioral Tray selection for Safe Tray - with utensils, or Safe Tray - NO utensils            Diagnoses:      1.  Schizoaffective disorder, bipolar type. (F25.0)  2.  Provisional diagnosis of stimulant use disorder, cocaine type.  (F14.10)  3.  Likely cannabis use disorder, moderate to severe. (F12.20)  4.  Cigarette nicotine dependence, currently withdrawal. (F17.213)         Assessment & Plan:   Assessment and hospital summary:  28-year-old woman admitted after she called 911 due to belief she was being followed. This is a common theme for her. Initially she was quite paranoid here. Since arrival she has mostly kept to herself, but has had some aggressive behaviors related to her paranoia. In addition, she snuck a phone onto the unit, which has not been put in her locker. She initially agreed to a trial of clozapine, but refused blood draws needed to  start this, so olanzapine was continued instead. She appeared to be doing better, but has had occasional outbursts where she will spit or otherwise go after staff or other patients. Following a seclusion on 12/7 she tried to tie her bra around her neck. She has remained paranoid since then and has been escalating the last few days leading to several episodes of restraints.    Target psychiatric symptoms and interventions:  Psychosis  -discontinued Zyprexa due to lack of efficacy on 12/13  -Started haloperidol 5 mg BID with IM backup as an Emergency Medication on 12/13. The patient is at serious risk for harm to self or other due to her psychosis and haloperidol is not currently on her [facility] order.    Medical Problems and Treatments:  None acute    Behavioral/Psychological/Social:  Encourage unit programming    Disposition Plan   Reason for ongoing admission: poses an imminent risk to self, poses an imminent risk to others and is unable to care for self due to severe psychosis or jacqueline  Discharge location: [facility] facility  Discharge Medications: not ordered  Follow-up Appointments: not scheduled  Legal Status: full commitment with [facility] for clozapine, olanzapine, aripiprazole, lurasidone. Patient has hearing to amend Aranda on 12/17 (started by outpatient provider) I have redone the note to request haloperidol, risperidone, chlorpromazine, and clozapine.  Entered by: Cy Sumner on 12/14/2018 at 7:30 PM

## 2018-12-15 NOTE — PROGRESS NOTES
"Pt was in the milieu.  Pt denies anx, dep, SI, SIB.  Pt is not aware of tx plan but wants to leave.  I need to find a place to go but once they find me a place /i will be ready to go.  I do feel much better.\"  "

## 2018-12-15 NOTE — PLAN OF CARE
RN Assessment    Patient has been medication compliant. She is pleasant and polite. Has had not episodes of aggression or escalating behaviors for 24 hours. She is alert, orient to self and place. She is hopeful for discharge sometime next week. She denies AH/VH. She denies depression/SI/SIB. Denies physical concerns. Pleasant and cooperative with staff and peers. She is able to make her needs known. Isolative to room.

## 2018-12-16 ENCOUNTER — APPOINTMENT (OUTPATIENT)
Dept: GENERAL RADIOLOGY | Facility: CLINIC | Age: 28
End: 2018-12-16
Attending: INTERNAL MEDICINE
Payer: COMMERCIAL

## 2018-12-16 ENCOUNTER — APPOINTMENT (OUTPATIENT)
Dept: CT IMAGING | Facility: CLINIC | Age: 28
End: 2018-12-16
Attending: INTERNAL MEDICINE
Payer: COMMERCIAL

## 2018-12-16 LAB
RADIOLOGIST FLAGS: ABNORMAL
RADIOLOGIST FLAGS: ABNORMAL

## 2018-12-16 PROCEDURE — 25000132 ZZH RX MED GY IP 250 OP 250 PS 637: Performed by: PSYCHIATRY & NEUROLOGY

## 2018-12-16 PROCEDURE — 25000132 ZZH RX MED GY IP 250 OP 250 PS 637: Performed by: NURSE PRACTITIONER

## 2018-12-16 PROCEDURE — 71045 X-RAY EXAM CHEST 1 VIEW: CPT

## 2018-12-16 PROCEDURE — 74176 CT ABD & PELVIS W/O CONTRAST: CPT

## 2018-12-16 PROCEDURE — 74018 RADEX ABDOMEN 1 VIEW: CPT

## 2018-12-16 PROCEDURE — 99207 ZZC MOONLIGHTING INDICATOR: CPT | Performed by: INTERNAL MEDICINE

## 2018-12-16 PROCEDURE — 12400003 ZZH R&B MH CRITICAL UMMC

## 2018-12-16 RX ORDER — ONDANSETRON 4 MG/1
4 TABLET, ORALLY DISINTEGRATING ORAL EVERY 6 HOURS PRN
Status: DISCONTINUED | OUTPATIENT
Start: 2018-12-16 | End: 2019-01-09 | Stop reason: HOSPADM

## 2018-12-16 RX ORDER — ALUMINA, MAGNESIA, AND SIMETHICONE 2400; 2400; 240 MG/30ML; MG/30ML; MG/30ML
30 SUSPENSION ORAL EVERY 4 HOURS PRN
Status: DISCONTINUED | OUTPATIENT
Start: 2018-12-16 | End: 2019-01-09 | Stop reason: HOSPADM

## 2018-12-16 RX ORDER — FAMOTIDINE 20 MG/1
20 TABLET, FILM COATED ORAL ONCE
Status: DISCONTINUED | OUTPATIENT
Start: 2018-12-16 | End: 2018-12-27 | Stop reason: CLARIF

## 2018-12-16 RX ADMIN — LORAZEPAM 2 MG: 2 TABLET ORAL at 16:18

## 2018-12-16 RX ADMIN — OLANZAPINE 10 MG: 10 TABLET, ORALLY DISINTEGRATING ORAL at 20:30

## 2018-12-16 RX ADMIN — TRAZODONE HYDROCHLORIDE 50 MG: 50 TABLET ORAL at 20:30

## 2018-12-16 RX ADMIN — HALOPERIDOL 5 MG: 5 TABLET ORAL at 16:18

## 2018-12-16 RX ADMIN — HYDROXYZINE HYDROCHLORIDE 25 MG: 25 TABLET, FILM COATED ORAL at 13:58

## 2018-12-16 RX ADMIN — HALOPERIDOL 5 MG: 5 TABLET ORAL at 19:14

## 2018-12-16 RX ADMIN — DIPHENHYDRAMINE HYDROCHLORIDE 50 MG: 50 CAPSULE ORAL at 16:18

## 2018-12-16 RX ADMIN — HALOPERIDOL 5 MG: 5 TABLET ORAL at 08:43

## 2018-12-16 ASSESSMENT — ACTIVITIES OF DAILY LIVING (ADL)
LAUNDRY: UNABLE TO COMPLETE
HYGIENE/GROOMING: INDEPENDENT
DRESS: SCRUBS (BEHAVIORAL HEALTH)
HYGIENE/GROOMING: INDEPENDENT
LAUNDRY: UNABLE TO COMPLETE
ORAL_HYGIENE: INDEPENDENT
ORAL_HYGIENE: INDEPENDENT
DRESS: SCRUBS (BEHAVIORAL HEALTH)

## 2018-12-16 NOTE — PROGRESS NOTES
Pt was isolative to room throughout the evening shift.  Compliant with vitals, ate dinner in her room.  Affect appears blunted, somewhat irritable on approach.  No behavioral concerns this shift.     12/15/18 2200   Behavioral Health   Hallucinations denies / not responding to hallucinations   Thinking poor concentration   Insight poor   Judgement impaired   Eye Contact at examiner   Affect blunted, flat   Mood irritable   Physical Appearance/Attire untidy   Hygiene neglected grooming - unclean body, hair, teeth   1. Wish to be Dead No   2. Non-Specific Active Suicidal Thoughts  No   Activity isolative   Speech clear;coherent   Psychomotor / Gait balanced;steady   Activities of Daily Living   Hygiene/Grooming independent;prompts   Oral Hygiene independent;prompts   Dress scrubs (behavioral health);independent   Laundry unable to complete   Room Organization prompts

## 2018-12-16 NOTE — PLAN OF CARE
48 hour nursing assessment:  Pt evaluation continues. Assessed mood, anxiety, thoughts, and behavior. Is progressing towards goals. Encourage participation in groups and developing healthy coping skills. Refer to daily team meeting notes for individualized plan of care. Will continue to assess.    Ilene had a good shift. She continues to isolate to her room most of the day listening to music, but was mildly social with staff during check ins. Flat affect, brightens occasionally when joked with. She continues to deny SI/SIB, denies AH/VH. States she feels her thinking has improved significantly and that she is not feeling paranoid, or that she is being followed. However was noted that pt will stand about 2-3 arm lengths away from people when talking, moving away if they move closer, and pt continues to have a wide eyed stair when talking. This is not always present, for instance she was able to sit on bed and discuss coping plan and self care strategies with writer without apparent discomfort. Will continue to monitor for possible underrreporting of symptoms. Pt asked appropriate questions about court process and IRTS referral.     Pt requested prn for anxiety at 1400, was given 25mg of Hydroxyzine with pt reporting relief of symptoms. Was able to go over coping plan with writer, showing good insight, see copy in paper chart.     Assessment of pt's progress toward meeting careplan goals: improving.

## 2018-12-16 NOTE — SIGNIFICANT EVENT
"Writer monitored pt while in 5pt restraints during the time of 1709 and 1720, to ensure RN medical observation as pt awaited interventions secondary to reportedly ingesting approximately 1.25 small unit -provided pt pencils and at least one bottle of unit provided \"total body shampoo.\" (Pt informed writer that she broke one pencil in half and swallowed the halves. Additionally indicated that she broke a second pencil and swallowed part of the pieces). Pt was face down in 5pt restraints during writer's observation period. Conversed calmly and cooperatively with writer, asking about interventions that would be employed by staff secondary to her alleged ingestions. Reassurance provided.    Medical: Pt appears in NAD. Breathing in non-labored. CMS intact. Pt denied any pain to writer, but later c/o R wrist restraint being slightly tight, which was loosened with the aid of slight repositioning of arm. Later when MD assessed pt, pt verbalized chest discomfort to doctor.   VS obtained x2 -  1709 vitals: /53, P 91, Resp 18, SpO2 99% on room air.   1720 vitals: /69, P 91, Resp 20, SpO2 98% on room air.     Pt transferred in restraints to obtain imaging secondary to alleged pencil ingestion.   It should be noted that pt informed writer that she ingested the pencils shortly after a Code 21 was called for her behaviors (see charting by unit RNs). Writer overheard pt yelling to staff that responded to the Code that she \"needs more attention.\"    Unit RNs aware of this information.   "

## 2018-12-16 NOTE — PROGRESS NOTES
Pt complained of right wrist restraint being too tight, RN adjusted, states improved comfort.  Endorses chest and stomach pain due to ingesting foreign objects.

## 2018-12-16 NOTE — PROGRESS NOTES
" Cross Cover Note    S: RN called about : patient reportedly swallowing pencil and shampoos.   Patient seen an evaluated immediately.   Per RN:   Patient allegedly ingested approximately 1.25 small unit -provided pt pencils and at least one to two bottles of unit provided \"total body shampoo.\" pt claimed that she broke one pencil in half and swallowed the halves. Additionally indicated that she broke a second pencil and swallowed part of the pieces.   Pt was face down in 5pt restraints during my evaluation with psychiatry staff watching her.   Reports some burning, discomfort in chest area.   Otherwise denies any other concern to me.   No NV. No cp or sob.         OBJECTIVE:  /69   Pulse 91   Temp 98.6  F (37  C) (Oral)   Resp 20   Wt 92.3 kg (203 lb 8 oz)   LMP 10/31/2018 (Approximate)   SpO2 98%   BMI 32.85 kg/m      Temp (24hrs), Av.4  F (36.9  C), Min:98.1  F (36.7  C), Max:98.6  F (37  C)      General: patient tied down with face down. alert, interactive. Calm, NAD otherwise.   HEENT: AT/NC, anicteric. Moist MM  Respi/Chest: non labored breathing.   CVS/Heart: RRR  GI/Abd: Soft, non tender, non distended, (limited exam given prone position)  MSK/Extremities: Distally wwf.          ASSESSMENT:    # Ingestion of Pencils.   # Ingestion of 2 bottles each 118 ml of total body shampoo.     PLAN:    Called Poison control ( helped to connect). Discussed about above ingestion. Per Poison control: shampoo is usually just an irritants, no intervention needed. Pencil has graphite and its non poisonous. No intervention recommended again from their stand point.     - GI notified about Pencil ingestion.   - Ordered NPO.     - Stat CXR, AXR done. Reviewed. XR with radiologist. No obvious FB. ? Concern for free air under L diaphragm.     CT Abdomen/Pelvis w/o contrast ordered.   Results reviewed. D/w radiologist.     \" There is a linear radiodense foreign body within the antrum of the stomach.  No acute " "pathology is otherwise identified in the abdomen or Pelvis.\"     D.w GI fellow on call Dr Kmuar.   Plan:   - NPO tonight.   - Likely EGD in the am.   - no other GI interventions tonight.   - also ordered mylanta oral prn for stomach discomfort. Ondansetron prn. Pepcid 20 mg po x 1.     Ct to monitor patient clinically.   RN to page with any significant interval change.     D/w RN. Nursing Supervisor.   Likely same day procedure. Patient needs to be transferred to [facility] in the am for the procedure.     Will sign out to IM team in am.       Dustin Heath MD  House Physician  Pager: [phone]    12/16/2018              "

## 2018-12-16 NOTE — PROGRESS NOTES
12/16/18 1358   Behavioral Health   Hallucinations denies / not responding to hallucinations   Thinking poor concentration   Orientation person: oriented;place: oriented;date: oriented;time: oriented   Memory baseline memory   Insight poor   Judgement impaired   Eye Contact at examiner   Affect blunted, flat   Mood mood is calm   Physical Appearance/Attire neat   Hygiene well groomed   Suicidality other (see comments)  (denies)   1. Wish to be Dead No   2. Non-Specific Active Suicidal Thoughts  No   Self Injury other (see comment)  (none observed)   Elopement (none observed)   Activity isolative;withdrawn   Speech clear;coherent   Medication Sensitivity no stated side effects   Psychomotor / Gait balanced;steady   Safety   Suicidality Status 15   Activities of Daily Living   Hygiene/Grooming independent   Oral Hygiene independent   Dress scrubs (behavioral health)   Laundry unable to complete   Room Organization independent

## 2018-12-16 NOTE — PROVIDER NOTIFICATION
12/16/18 1428   Seclusion or Restraint Order   In Person Face to Face Assessment Conducted Yes-Eval of pt's immediate situation, reaction to intervention, complete review of systems assessment, behavioral assessment & review/assessment of hx, drugs & meds, recent labs, etc, behavioral condition, need to continue/terminate restraint/seclusion   Patient Experienced No adverse physical outcome from seclusion/restraint initiation   Describe physical sequela  yes, pt swallowed pencil/shampoo   Continuation of Seclus/Restraint indicated at this time Yes   Pt agitated and initially was willing to walk to her room and take PO medications to calm down.  After taking medications, patient continued to be agitated in her room, pounding on walls and screaming. Security alert was called due to pt having a pencil and potentially using it as a weapon.  Security and staff rushed into room when patient began to break apart a pencil and begin to put pieces in her mouth.  Patient laid on bed and 5 point restraints were placed due patients history of SIB and continued agitation. Patient experienced no adverse physical affects as a result of staff going hands on and applying 5 point restrains.  Provider, ANS, House officer all immediately notified.  Patient taken to xray for additional assessment with security, RN, and additional staff.  Staff will continue to monitor.

## 2018-12-17 LAB — UPPER GI ENDOSCOPY: NORMAL

## 2018-12-17 PROCEDURE — 43235 EGD DIAGNOSTIC BRUSH WASH: CPT | Performed by: INTERNAL MEDICINE

## 2018-12-17 PROCEDURE — 25000132 ZZH RX MED GY IP 250 OP 250 PS 637: Performed by: PSYCHIATRY & NEUROLOGY

## 2018-12-17 PROCEDURE — 25000132 ZZH RX MED GY IP 250 OP 250 PS 637: Performed by: INTERNAL MEDICINE

## 2018-12-17 PROCEDURE — 12400003 ZZH R&B MH CRITICAL UMMC

## 2018-12-17 PROCEDURE — 40000104 ZZH STATISTIC MODERATE SEDATION < 10 MIN: Performed by: INTERNAL MEDICINE

## 2018-12-17 PROCEDURE — 25000128 H RX IP 250 OP 636: Performed by: NURSE PRACTITIONER

## 2018-12-17 PROCEDURE — 25000128 H RX IP 250 OP 636: Performed by: INTERNAL MEDICINE

## 2018-12-17 PROCEDURE — 99233 SBSQ HOSP IP/OBS HIGH 50: CPT | Performed by: PSYCHIATRY & NEUROLOGY

## 2018-12-17 PROCEDURE — 25000125 ZZHC RX 250: Performed by: INTERNAL MEDICINE

## 2018-12-17 PROCEDURE — 0DJ08ZZ INSPECTION OF UPPER INTESTINAL TRACT, VIA NATURAL OR ARTIFICIAL OPENING ENDOSCOPIC: ICD-10-PCS | Performed by: INTERNAL MEDICINE

## 2018-12-17 RX ORDER — SIMETHICONE
LIQUID (ML) MISCELLANEOUS PRN
Status: DISCONTINUED | OUTPATIENT
Start: 2018-12-17 | End: 2018-12-17 | Stop reason: HOSPADM

## 2018-12-17 RX ORDER — FENTANYL CITRATE 50 UG/ML
INJECTION, SOLUTION INTRAMUSCULAR; INTRAVENOUS PRN
Status: DISCONTINUED | OUTPATIENT
Start: 2018-12-17 | End: 2018-12-17 | Stop reason: HOSPADM

## 2018-12-17 RX ADMIN — HALOPERIDOL 5 MG: 5 TABLET ORAL at 16:15

## 2018-12-17 RX ADMIN — HALOPERIDOL 5 MG: 5 TABLET ORAL at 20:39

## 2018-12-17 RX ADMIN — DIPHENHYDRAMINE HYDROCHLORIDE 50 MG: 50 CAPSULE ORAL at 16:15

## 2018-12-17 RX ADMIN — OLANZAPINE 10 MG: 10 INJECTION, POWDER, FOR SOLUTION INTRAMUSCULAR at 13:38

## 2018-12-17 ASSESSMENT — ACTIVITIES OF DAILY LIVING (ADL)
DRESS: SCRUBS (BEHAVIORAL HEALTH)
LAUNDRY: UNABLE TO COMPLETE
ORAL_HYGIENE: INDEPENDENT
HYGIENE/GROOMING: HANDWASHING;INDEPENDENT

## 2018-12-17 NOTE — PROGRESS NOTES
"Pt is repeatedly screaming \"Everybody kill yourselves! Everyone! You can kill yourself by shoving a pencil down your neck!! Or cut yourself with your vein!!\" Pt then abruptly stops yelling and asks if she may be released from restraints. When she is provided with discontinuation criteria, she begins to scream again. When speaking with her RN, she notes she was \"joking\" when she screamed to kill a nurse.  "

## 2018-12-17 NOTE — PROVIDER NOTIFICATION
12/16/18 1900   Vital Signs   Temp 98.1  F (36.7  C)   Temp src Tympanic   Pulse 102   Pulse/Heart Rate Source Monitor   /67     Pt VSS. She reports mild GI discomfort though declines interventions (e.g. maalox). She requested PRN medication at HS to promote sleep and decrease agitation. She was given PRN olanzapine and trazodone. Hospitalist contacted unit to notify of CT results, which confirm ingestion of foreign body, which is linear and appears consistent with pts report of a pencil being swallowed. He notes pt will likely require endoscopy to remove the object tomorrow, per GI. Time is undetermined at this point (unable to schedule the procedure at this time,  availability of GI is unknown). No new nursing interventions or monitoring required at this time, though continue to monitor for change in condition.

## 2018-12-17 NOTE — PROVIDER NOTIFICATION
12/17/18 1345   Seclusion or Restraint Order   In Person Face to Face Assessment Conducted Yes-Eval of pt's immediate situation, reaction to intervention, complete review of systems assessment, behavioral assessment & review/assessment of hx, drugs & meds, recent labs, etc, behavioral condition, need to continue/terminate restraint/seclusion   Face to face completed. Patient c/o of arm pain. LICHA Anderson [place] on the floor and examined patient. Patient in bed 5 point restraints. No other physical concerns voiced. Psychiatry in to see patient at this time.

## 2018-12-17 NOTE — PROVIDER NOTIFICATION
12/17/18 1625   Education   Discontinuation Criteria Cessation of behavior that precipitated seclusion or restraint   Criteria Explained Yes   Patient's Response DU   Family Notification D   Restraint Type   Wrist - R (BH) Discontinued   Wrist - L (BH) Discontinued   Ankle - R (BH) Discontinued   Ankle - L (BH) Discontinued   Chest (BH) Discontinued   Debriefing   Debriefing DO   Does patient understand why the event happened? Yes   Does patient agree to safe behaviors? Yes   What can we do differently so this doesn't happen again? Other (comment)  (talk to staff prior to SIB and threatening behavior)   Plan of care reviewed and modified Yes     Patient prior to discontinuation yelling and threatening towards staff. RN processed with patient who agreed to calm for 30 minutes and demonstrate safe behavior needed to process out of restraints. Patient at this time  was agreeable to taking oral PRN Haldol and benadryl. Patient demonstrated safe and calm behavior for approximately 30 minutes at which time code green was called to facilitate discontinuation. Patient was cooperative with taking oral PRN medication and after coming out of restraints was offered bathroom, snack, and fluids all of which she was accepting of.

## 2018-12-17 NOTE — PROGRESS NOTES
Writer given ok from Dr. Lyons and Justin Burrell to administer IM Zyprexa  Within timeframe of versed and fentanlyl given at 1211 and 1215 earlier today for endoscopy

## 2018-12-17 NOTE — PROVIDER NOTIFICATION
12/16/18 1800   Debriefing   Debriefing DO   Does patient understand why the event happened? Yes   Does patient agree to safe behaviors? Yes   What can we do differently so this doesn't happen again? Patient refuses to answer   Plan of care reviewed and modified Yes     Pt returned from imaging procedure. She agrees to safe behaviors upon return to the unit and plans to remain in her room the rest of the evening. She appears calm and contracts for safety. She verbalizes awareness of unsafe behaviors and behavioral expectations on the unit. Restraints DC'd.     All belongings removed from her room including linens (except one green blanket), lotions, soaps, etc. Pt also placed on SIO due to self injurious behavior this evening though denies suicidal thoughts. Order for SIO obtained from Dr. Mcgarry. Will follow up with medicine re: ingestion concerns. No new RN monitoring required per hospitalist Dr. Heath, though will monitor for changes in status.

## 2018-12-17 NOTE — PLAN OF CARE
"Pt demonstrated significant increase in behavioral dysregulation this evening. She had gone a period of 4 days without engaging in aggressive of self injurious behavior. This evening pt became agitated when provoked by a peer who had called her \"crazy.\" Multiple least restrictive interventions were utilized in an effort to reduce pts level of agitation, including verbal de-escalation, medication administration, reassurance and support and modified programming. The patient continued to escalate however, ultimately requiring 5 pt restraints due to the immediate safety risk posed to herself and others (spitting at staff, throwing objects, and swallowing a golf pencil and possible shampoo). She denies SI and stated that \"the voices made me do it!\" though during the code, also endorsed \"wanting attention\" (see previous RN documentation this shift). She was assessed by the hospitalist Dr. Heath, following this incident. ANS, on call psychiatrist Dr. Mcgarry and the unit nurse manager were notified. See writer's previous note re: follow up care regarding ingestion of foreign body. Although pt denies SI, she demonstrates SIB and exhibited impulsive and unpredictable behavior this evening. She was placed on an SIO for safety and increased monitoring. Following Xray and CT scan pt requested PRN medication for agitation and sleep and was given PRN olanzapine and trazodone at HS.  Additionally, all items except one green blanket were removed from her room. Pt denies SEs to medication, and denies other physical health concerns. Will continue to monitor closely and assess for change in patient condition.   "

## 2018-12-17 NOTE — PROVIDER NOTIFICATION
"   12/16/18 1635   Seclusion or Restraint Order   Length of Order 4   Order Obtained Yes   Attending Physician Notified Yes   Attending Physician's Name Andish     Pt became angry when a peer called her \"crazy.\" She threw a cup of water in his direction. Pt was able to be verbally deescalated and redirected to her room. She was offered PRN medication and declined at that time, she verbalized understanding of unsafe behaviors and agreed to remain in her room until she had calmed herself down. She walked back to her room without issue, and contracted for safety. Upon returning to her room, without warning she began to pound on the walls of her room and windows and began to swear in her room. A code 21 was called and pt was offered PO PRN medication which she accepted. She was accepting of feedback and had not made threats to staff during administration of PRN medication. Clinical history has demonstrated that pt often de-escalates shortly after PRN medication administration, and she denied urges to self harm. Therefore clinical decision was made to allow pt to de-escalate in her room with increased frequency of checks. She was also provided reassurance and support, modified programming and ongoing de-escalation techniques were used with therapeutic success. Shortly after the code team left, the pt opened her door and attempted to spit on staff. A code 21 was called. Pt then grabbed a pencil which she had in her room, and held it in her hand as a weapon. The code was upgraded to a code 21 with security alert. While the code team arrived, pt was observed by staff to attempt to break apart the golf pencil and put it to her mouth. Staff immediately entered the room, however pt remained combative and attempted to spit at and attack staff. Pt placed in restraints. Pt then reported having swallowed one golf pencil and shampoo (though staff did not witness her drinking shampoo and had monitored her during the code and leading " up to entering the room). Of note, a golf pencil was found in her room, broken into three pieces, though all pieced matched the size of a complete one. On call psychiatrist notified of restraint, and order obtained for restraints due to danger to self and others. Psychiatrist Dr. Mcgarry also notified of swallowing incident.  Instructed to contact hospitalist. Hospitalist ordered Xray and will assess the patient. Pt denies pain or discomfort at this time. Will continue to monitor closely. ANS aware.

## 2018-12-17 NOTE — PROGRESS NOTES
On-call provider (Dr. Mcgarry) was notified about the events of the evening and the patient's current condition.  Patient was placed on SIO and was informed that all items (besides the patient's clothing, glasses, mattress and 1 blanket) were removed from the room.  No additional orders placed.

## 2018-12-17 NOTE — PROGRESS NOTES
Pt continues to stay on SIO for safety.  Pt slept all shift.  No complaints of GI upset or pain.  Will continue to monitor.

## 2018-12-17 NOTE — OR NURSING
EGD under conscious sedation.  No interventions.  Report given to Murtaza HERNANDEZ who's in room with pt.  Post exam pt denies CP/SOB

## 2018-12-17 NOTE — PROGRESS NOTES
"   12/17/18 1530   Restraint Monitoring Q15 Minutes   Psychological Status YE;ST   Physical Comfort D   Circulation NS   Continuous Observation Yes   Restraint Type   Wrist - R (BH) Continued   Wrist - L (BH) Continued   Ankle - R (BH) Continued   Ankle - L (BH) Continued   Chest (BH) Continued    Pt screaming loudly: \"Everybody escape! Everybody kill a nurse! If you don't have a weapon grab a pencil! Let's escape! Everybody killl a nurse!\"  "

## 2018-12-17 NOTE — CONSULTS
Brief medicine note:  - Pt swallowed a pencil last night. Please refer to Dr. Heath's note last evening for full details. Pt on scheduled to obtain EGD and retrieval of pencil by GI team this am. Pt NPO since last evening. Pt agreeable to procedure and is consentable. Currently denies acute physical concerns including abd pain and nausea. Transportation to [facility] endoscopy suite at approximately 11 am. Medicine will follow up and see pt again after she returns.      Justin Burrell PA-C  Internal Medicine Hospitalist   [facility]  [phone]
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
## 2018-12-17 NOTE — PROGRESS NOTES
Pt escorted to both xray and CT with presence of security, code 2 and ticket to ride. Pt VSS, and pt denies acute pain or discomfort at this time. Ate dinner without apparent discomfort, though hospitalist entered order for NPO status after meal time. No further snacks/meals will be provided. Hospitalist notified that pt ate dinner.

## 2018-12-18 PROCEDURE — 99231 SBSQ HOSP IP/OBS SF/LOW 25: CPT | Performed by: PHYSICIAN ASSISTANT

## 2018-12-18 PROCEDURE — 25000132 ZZH RX MED GY IP 250 OP 250 PS 637: Performed by: PSYCHIATRY & NEUROLOGY

## 2018-12-18 PROCEDURE — 12400003 ZZH R&B MH CRITICAL UMMC

## 2018-12-18 PROCEDURE — 99232 SBSQ HOSP IP/OBS MODERATE 35: CPT | Performed by: PSYCHIATRY & NEUROLOGY

## 2018-12-18 RX ADMIN — DIPHENHYDRAMINE HYDROCHLORIDE 50 MG: 50 CAPSULE ORAL at 19:14

## 2018-12-18 RX ADMIN — HALOPERIDOL 5 MG: 5 TABLET ORAL at 08:39

## 2018-12-18 RX ADMIN — HALOPERIDOL 5 MG: 5 TABLET ORAL at 19:14

## 2018-12-18 RX ADMIN — LORAZEPAM 2 MG: 2 TABLET ORAL at 19:14

## 2018-12-18 ASSESSMENT — ACTIVITIES OF DAILY LIVING (ADL)
ORAL_HYGIENE: INDEPENDENT
LAUNDRY: WITH SUPERVISION
DRESS: SCRUBS (BEHAVIORAL HEALTH)
DRESS: INDEPENDENT
LAUNDRY: UNABLE TO COMPLETE
ORAL_HYGIENE: INDEPENDENT
HYGIENE/GROOMING: INDEPENDENT
HYGIENE/GROOMING: INDEPENDENT

## 2018-12-18 NOTE — PROGRESS NOTES
[facility], [place]   Psychiatric Progress Note  Ilene Liu  [id number]  12/17/18    Chief Complaint: Continued medical care  Time: 39 minutes on encounter, >50% of which was spent in counseling and/or coordination of care consisting of: communication and education with the patient/family, lab/image/study evaluation, support staff communication, and other sources pertinent to excellent patient care.          Interim History:   The patient's care was discussed with the treatment team during the daily team meeting and/or staff's chart notes were reviewed.  Staff report patient has had some self injury, swallowing objects and according to internal medicine was going to have the procedure today.  Is now placed on status and visual observation placed in restraints having auditory hallucinations and slept about 7 hours.    Upon visiting with her she is very upset about her treatment today and was heard yelling about organizing the unit to attack the staff members and escape the unit.  She is more calm when discussing her current issues and comes over our conversation.  Denies any thoughts of harming herself or others still has auditory hallucinations at times that she does not want to discuss and appears to be paranoid at times.  Says that her meds are okay but she has some kind of side effect she does not want to talk about and is sleeping well no attention or concentration issues or hopelessness or helplessness.  Denies any guilty feelings or sadness and is not grandiose currently.  She is future oriented and hopes to discharge out of the hospital to a family member or go to a shelter prior to going to an IRT S.  She was informed later that the  would likely not agree to a discharge plan other than on IRT S.  She is hoping to get out of the hospital so she can fill out her Social Security disability paperwork and complete other tasks that need to be done.  She was  agreeable to cease and her self sabotage by having aggressive behaviors on the unit and was encouraged to use her alone space if she is agitated or upset.         Medications:       famotidine  20 mg Oral Once     haloperidol  5 mg Oral BID    Or     haloperidol lactate  5 mg Intramuscular BID          Allergies:   No Known Allergies       Labs:     Recent Results (from the past 24 hour(s))   UPPER GI ENDOSCOPY    Collection Time: 12/17/18 11:26 AM   Result Value Ref Range    Upper GI Endoscopy       [facility], [address] [place] [address] [phone]     Endoscopy Department  _______________________________________________________________________________  Patient Name: Ilene Aided             Procedure Date: 12/17/2018 11:26 AM  MRN: [id number]                       Account Number: [id number]  YOB: 1990               Admit Type: Inpatient  Age: 28                                Gender: Female  Note Status: Supervisor Override      Attending MD: Cassie Rahman MD  Pause for the Cause: Pause for cause completed. Total Sedation Time:   _______________________________________________________________________________     Procedure:           Upper GI endoscopy  Indications:         Foreign body in the stomach  Providers:           Cassie Rahman MD, Lawrence Lennon RN  Referring MD:          Medicines:           Midazolam 3 mg IV, Fentanyl 75 micrograms IV  Complications:       No immediate complications.  ___________________________________________ ____________________________________  Procedure:           Pre-Anesthesia Assessment:                       - Prior to the procedure, a History and Physical was                        performed, and patient medications and allergies were                        reviewed. The patient is competent. The risks and                        benefits of the procedure and the sedation options and                        risks were discussed with the  patient. All questions                        were answered and informed consent was obtained. Patient                        identification and proposed procedure were verified by                        the physician and the nurse in the pre-procedure area.                        Mental Status Examination: alert and oriented. Airway                        Examination: normal oropharyngeal airway and neck                        mobility. Respiratory Examination: clear to                        auscultation. CV Examination: normal. Prophylactic                         Antibiotics: The patient does not require prophylactic                        antibiotics. Prior Anticoagulants: The patient has taken                        no previous anticoagulant or antiplatelet agents. ASA                        Grade Assessment: II - A patient with mild systemic                        disease. After reviewing the risks and benefits, the                        patient was deemed in satisfactory condition to undergo                        the procedure. The anesthesia plan was to use moderate                        sedation / analgesia (conscious sedation). Immediately                        prior to administration of medications, the patient was                        re-assessed for adequacy to receive sedatives. The heart                        rate, respiratory rate, oxygen saturations, blood                        pressure, adequacy of pulmonary ventilation, and                        response to care were monitored throughout the                         procedure. The physical status of the patient was                        re-assessed after the procedure.                       After obtaining informed consent, the endoscope was                        passed under direct vision. Throughout the procedure,                        the patient's blood pressure, pulse, and oxygen                        saturations were monitored  continuously. The Endoscope                        was introduced through the mouth, and advanced to the                        second part of duodenum. The upper GI endoscopy was                        accomplished without difficulty. The patient tolerated                        the procedure well.                                                                                   Findings:       The examined esophagus was normal.       The Z-line was regular and was found 37 cm from the incisors.       The cardia, gastric fundus, gastric body, incisura, gastric antrum and        pylorus were normal. No ev idence of foreign body in the stomach.       The duodenal bulb, second portion of the duodenum and third portion of        the duodenum were normal.                                                                                   Moderate Sedation:       Moderate (conscious) sedation was administered by the endoscopy nurse        and supervised by the endoscopist. The following parameters were        monitored: oxygen saturation, heart rate, blood pressure, and response        to care. Total physician intraservice time was 7 minutes.  Impression:          - Normal esophagus.                       - Z-line regular, 37 cm from the incisors.                       - Normal cardia, gastric fundus, gastric body, incisura,                        antrum and pylorus. No evidence of foreign body in the                        stomach.                       - Normal duodenal bulb, second portion of the duodenum                        and third portion of the duodenum.                       - No s pecimens collected.  Recommendation:      - Return patient to hospital [facility] for ongoing care.                       - Resume previous diet.                       - Continue present medications.                       - Obtain a plain AXR to document passage of the foreign                        body in 2 days.                       -  Educated patient on importance of seeking help                        immediately if she develops abdominal pain, melena or                        bright red blood per rectum.                                                                                     Electronically signed by: Cassie Rahman  ________________  Cassie Rahman MD  12/17/2018 12:33:53 PM  I was physically present for the entire viewing portion of the exam.  __________________________  Signature of teaching physician  [id number] Rahman MD  Number of Addenda: 0    Note Initiated On: 12/17/2018 11:26 AM  Scope In:  Scope Out:            Psychiatric Examination:     /68   Pulse 102   Temp 98.1  F (36.7  C) (Tympanic)   Resp 14   Wt 92.3 kg (203 lb 8 oz)   LMP 10/31/2018 (Approximate)   SpO2 (!) 77%   BMI 32.85 kg/m    Weight is 203 lbs 8 oz  Body mass index is 32.85 kg/m .                Sitting Orthostatic BP: 124/67      Sitting Orthostatic Pulse: 102 bpm      Standing Orthostatic BP: 109/57      Standing Orthostatic Pulse: 86 bpm       Weight over time:  Vitals:    12/08/18 0900   Weight: 92.3 kg (203 lb 8 oz)       Orthostatic Vitals       Most Recent      Sitting Orthostatic /67 12/16 1900    Sitting Orthostatic Pulse (bpm) 102 12/16 1900    Standing Orthostatic /57 12/16 0700    Standing Orthostatic Pulse (bpm) 86 12/16 0700            Ilene is a 28-year-old female with short black hair and is overweight.  Her speech is of an appropriate rate and tone and her language is intact.  Her behavior is yelling at times.  Her affect is irritable but calming.  Her mood she describes as upset.  Her thoughts consists of the above without thoughts of harming herself or others or delusional content.  Thought process is ruminative without looseness of association.  She may have abnormal perceptions at times.  She is alert and aware of her current location and circumstances.  Her attention and concentration appear limited.  Her  cognition and fund of knowledge appear below average.  Her long-term/short-term/remote memory appear limited.  Her insight and judgment are both impaired.         Precautions:     Behavioral Orders   Procedures     Assault precautions     Code 1 - Restrict to Unit     Code 2     Routine Programming     As clinically indicated     Status 15     Every 15 minutes.     Status Individual Observation     2:1 one male and one female     Order Specific Question:   CONTINUOUS 24 hours / day     Answer:   Other     Order Specific Question:   Specify distance     Answer:   10 feet from others     Order Specific Question:   Indications for SIO     Answer:   Self-injury risk     Order Specific Question:   Indications for SIO     Answer:   Suicide risk     Order Specific Question:   Indications for SIO     Answer:   Assault risk     Suicide precautions     Patients on Suicide Precautions should have a Combination Diet ordered that includes a Diet selection(s) AND a Behavioral Tray selection for Safe Tray - with utensils, or Safe Tray - NO utensils            DIagnoses:     Schizoaffective disorder bipolar type  Can use disorder  Tobacco use disorder  Cannabis use disorder  Rule out intellectual disability  Rule out personality disorder         Assessment & Plan:     Ilene continues to have disruptive behaviors on the unit and has difficulty regulating her emotions and behaviors.  Is unclear to me if this is related to a psychotic process or a developmental process.  Most of these symptoms appear to be behavioral in nature potentially her trying to manipulate or get something from the interaction.  It seems that she should eventually learned that to being aggressive and dysfunction on the unit will likely not get her discharged.  If she has less cognitive function she may not understand this fact.    Currently committed with Aranda of haloperidol, risperidone, chlorpromazine, clozapine    The risks, benefits, alternatives and side  effects have been discussed and are understood by the patient and other caregivers.      Nathan Vincent  [facility]      The following document has been created with voice recognition software and may contain unintentional word substitutions.    Non clinically relevant CMS requirements:  Clinical Global Impressions  First:  Considering your total clinical experience with this particular patient population, how severe are the patient's symptoms at this time?: 7 (11/28/18 1001)  Compared to the patient's condition at the START of treatment, this patient's condition is:: 4 (11/28/18 1001)  Most recent:  Considering your total clinical experience with this particular patient population, how severe are the patient's symptoms at this time?: 7 (12/07/18 1155)  Compared to the patient's condition at the START of treatment, this patient's condition is:: 3 (12/07/18 1155)

## 2018-12-18 NOTE — PROGRESS NOTES
Due to patient being on 10 foot space restriction she was transferred to a room with a bathroom. RN writer provided a specimen catch in the patients toilet and educated her on the purpose of the monitoring to assess for when the pencils she ingested pass. Patient did have a bowel movement shortly after this education but removed the specimen catch and flushed before writer could assess contents. Patient stated she was uncomfortable with not flushing the contents. RN writer provided further education to urge the importance of monitoring. PT verbalized acceptance.

## 2018-12-18 NOTE — PROGRESS NOTES
12/18/18 1423   Behavioral Health   Hallucinations denies / not responding to hallucinations   Thinking poor concentration   Orientation time: oriented;place: oriented;date: oriented   Memory baseline memory   Insight insight appropriate to events   Judgement impaired   Eye Contact at examiner   Affect blunted, flat   Mood mood is calm   Physical Appearance/Attire attire appropriate to age and situation   Hygiene pre-occupied with grooming   Suicidality (denied )   1. Wish to be Dead No   2. Non-Specific Active Suicidal Thoughts  No   Self Injury (denied )   Elopement (denied )   Activity withdrawn;isolative   Speech coherent;clear   Medication Sensitivity no stated side effects   Psychomotor / Gait balanced   Psycho Education   Type of Intervention 1:1 intervention   Response participates, initiates socially appropriate   Hours 0.5   Activities of Daily Living   Hygiene/Grooming independent   Oral Hygiene independent   Dress scrubs (behavioral health)   Laundry with supervision   Room Organization independent   Activity   Activity Assistance Provided independent     Pt was calm with blunted mood affect.  She was cooperative with personal care, unit protocols, polite toward staff members and patient with her requests. She spent the all shift in her room either pacing or resting. She has been able to make a phone call and took shower with staff members assistance. She denied all metal health symptoms including SI/SIB. Appetite was good and sleeping and napping well. No behavioral issues or concerns during this shift.
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
## 2018-12-18 NOTE — PROGRESS NOTES
Brief medicine note:  S: Pt remains on 2:1 after seclusion yesterday. She currently denies acute physical concerns at this time including abd pain. Had BM yesterday afternoon but denies melena or hematochezia. Denies seeing remnant of broken pencil in stool.     O: In NAD  Blood pressure 118/68, pulse 102, temperature 98.1  F (36.7  C), temperature source Tympanic, resp. rate 14, weight 92.3 kg (203 lb 8 oz), last menstrual period 10/31/2018  ABD: +BS+; SNTND    ASSESSMENT:  S/p swallowing of broken small pencil early morning yesterday, with f/u EGD same day revealing fragment had already passed out of stomach. Currently pt denies sxs and abd exam normal.     PLAN:  No medical intervention indicated at this time. Continue to monitor pt's future BMs for evidence that pencil fragment has passed.         Justin Burrell PA-C  Internal Medicine Hospitalist   [facility]  [phone]

## 2018-12-18 NOTE — PROGRESS NOTES
Pt requested to use a bed sheet during prayer time. RN approved request. Staff explained to pt that she could use the sheet during prayer but that she had to return it to staff when done.

## 2018-12-18 NOTE — PROGRESS NOTES
[facility], [place]   Psychiatric Progress Note  Ilene Morgan Aided  [id number]  12/18/18    Chief Complaint: Continued medical care          Interim History:   The patient's care was discussed with the treatment team during the daily team meeting and/or staff's chart notes were reviewed.  Staff report patient isolated to her room has been somewhat aggressive but better overnight had a bowel movement that was not visualized and slept about 7 hours.    Upon visiting with her says that she is feeling good and that her swallowing things was more of a behavior she was not actually hearing voices to tell her to do this.  She is interested in getting out of the hospital and getting her Social Security paperwork.  We discussed this again in the hopes that someone can bring her her paperwork.  She is not interested in going to court just wants everything done so she can get out.  Explained behaviors on the unit preventing her from being discharged and she was accepting of this.  Informed her that we would continue to remind her about her behaviors.  She says that the auditory hallucinations have improved she denies any thoughts of harming herself or others any paranoia or any attention or concentration issues or sleep dysfunction or energy problems or any hopelessness or helplessness.  She is future oriented and does not have any grandiose ideas about herself or delusional content.  She is not having any current abdominal pain related to her ingestion.         Medications:       famotidine  20 mg Oral Once     haloperidol  5 mg Oral BID    Or     haloperidol lactate  5 mg Intramuscular BID          Allergies:   No Known Allergies       Labs:   No results found for this or any previous visit (from the past 24 hour(s)).       Psychiatric Examination:     /70   Pulse 85   Temp 98.3  F (36.8  C) (Tympanic)   Resp 16   Wt 92.3 kg (203 lb 8 oz)   LMP 10/31/2018 (Approximate)   SpO2 (!)  77%   BMI 32.85 kg/m    Weight is 203 lbs 8 oz  Body mass index is 32.85 kg/m .                Sitting Orthostatic BP: 124/67      Sitting Orthostatic Pulse: 102 bpm      Standing Orthostatic BP: 110/70      Standing Orthostatic Pulse: 75 bpm       Weight over time:  Vitals:    12/08/18 0900   Weight: 92.3 kg (203 lb 8 oz)       Orthostatic Vitals       Most Recent      Standing Orthostatic /70 12/18 1158    Standing Orthostatic Pulse (bpm) 75 12/18 1158            Ilene is a 28-year-old female with short black hair and is overweight.  Her speech is of an appropriate rate and tone and her language is intact.  Her behavior is pacing at times.  Her affect is irritable but calming.  Her mood she describes as better.  Her thoughts consists of the above without thoughts of harming herself or others or delusional content.  Thought process is ruminative without looseness of association.  She may have abnormal perceptions at times.  She is alert and aware of her current location and circumstances.  Her attention and concentration appear limited.  Her cognition and fund of knowledge appear below average.  Her long-term/short-term/remote memory appear limited.  Her insight and judgment are both impaired.         Precautions:     Behavioral Orders   Procedures     Assault precautions     Code 1 - Restrict to Unit     Routine Programming     As clinically indicated     Status 15     Every 15 minutes.     Status Individual Observation     2:1 one male and one female     Order Specific Question:   CONTINUOUS 24 hours / day     Answer:   Other     Order Specific Question:   Specify distance     Answer:   10 feet from others     Order Specific Question:   Indications for SIO     Answer:   Self-injury risk     Order Specific Question:   Indications for SIO     Answer:   Suicide risk     Order Specific Question:   Indications for SIO     Answer:   Assault risk     Suicide precautions     Patients on Suicide Precautions should  have a Combination Diet ordered that includes a Diet selection(s) AND a Behavioral Tray selection for Safe Tray - with utensils, or Safe Tray - NO utensils            DIagnoses:     Schizoaffective disorder bipolar type  Can use disorder  Tobacco use disorder  Cannabis use disorder  Rule out intellectual disability  Rule out personality disorder         Assessment & Plan:     Ilene has come down after her disruptive behaviors yesterday and admits to most of this was related to behavioral circumstances and not related to psychotic symptoms.  When she gets angry or upset she responds this way and we discussed how we need to work on that and think of different ways for her to calm down and also use therapeutic techniques on the unit to help maintain her composure.  She is understanding that she keeps sabotaging her potential discharge.  At this point time will continue current medications though they have likely not fully started working.    Currently committed with Aranda of haloperidol, risperidone, chlorpromazine, clozapine    The risks, benefits, alternatives and side effects have been discussed and are understood by the patient and other caregivers.      Nathan Vincent  [facility]      The following document has been created with voice recognition software and may contain unintentional word substitutions.    Non clinically relevant CMS requirements:  Clinical Global Impressions  First:  Considering your total clinical experience with this particular patient population, how severe are the patient's symptoms at this time?: 7 (11/28/18 1001)  Compared to the patient's condition at the START of treatment, this patient's condition is:: 4 (11/28/18 1001)  Most recent:  Considering your total clinical experience with this particular patient population, how severe are the patient's symptoms at this time?: 5 (12/18/18 1443)  Compared to the patient's condition at the START of treatment, this  patient's condition is:: 3 (12/18/18 3474)

## 2018-12-18 NOTE — PROVIDER NOTIFICATION
12/18/18 1158   Vital Signs   Temp 98.3  F (36.8  C)   Temp src Tympanic   Resp 16   Pulse 85   Pulse/Heart Rate Source Monitor   /70   Standing Orthostatic BP   Standing Orthostatic /70   Standing Orthostatic Pulse 75 bpm   Pain/Comfort   Patient Currently in Pain denies     Patient presents as calm, pleasant, and cooperative.  She denies that she is experiencing any psychotic symptoms or dangerous ideations at this time.  She appears alert and fully oriented with no evidence of psychotic thought.  She accepted vital signs assessment this morning and VSS.  She denies any acute physical concerns.  She has been compliant with all of her scheduled medications, and no adverse side effects have been reported or observed.  Given her extensive history of dangerous behaviors in the inpatient setting- both SIB with the ingestion of foreign objects (pencil, shampoo, etc.) and severely aggressive behavior toward unit staff- Ilene continues on SIO 2:1 staffing pattern and a 10 foot space restriction from others.  She has cooperated with this restriction, following prompts from unit staff.  Extensive efforts- such as removing all peers from lounge #2, removing any small objects that she could potentially swallow, and closing the unit dividing doors - have been made to allow Ilene to spend time out of her room and in the lounge to get exercise, use the phone, access the shower, etc.  Ilene did attend to personal hygiene maintenance today with showering; a female staff member stood outside of the shower, with a foot in the door to monitor patient, and placed items (tablespoon of conditioner, body soap, shampoo) in Ilene s hand as needed to adequately attend to hygiene.  With regard to the FB (pencil) that we are monitoring for with each stool, Ilene is refusing to cooperate; she reports that this is too intrusive, a violation of her privacy, to visualize each stool and check for the passage of the FB.  Ilene did  agree to visualize each stool before flushing and alert unit staff should the pencil appear.  Ultimately, we will be confirming the passage of the FB with imaging prior to Ilene discharging from the hospital.  Ilene had no further complaints or requests.

## 2018-12-19 ENCOUNTER — APPOINTMENT (OUTPATIENT)
Dept: GENERAL RADIOLOGY | Facility: CLINIC | Age: 28
End: 2018-12-19
Attending: PHYSICIAN ASSISTANT
Payer: COMMERCIAL

## 2018-12-19 PROCEDURE — 99207 ZZC CDG-MDM COMPONENT: MEETS LOW - DOWN CODED: CPT | Performed by: PHYSICIAN ASSISTANT

## 2018-12-19 PROCEDURE — 25000132 ZZH RX MED GY IP 250 OP 250 PS 637: Performed by: NURSE PRACTITIONER

## 2018-12-19 PROCEDURE — 74018 RADEX ABDOMEN 1 VIEW: CPT

## 2018-12-19 PROCEDURE — 12400003 ZZH R&B MH CRITICAL UMMC

## 2018-12-19 PROCEDURE — 99232 SBSQ HOSP IP/OBS MODERATE 35: CPT | Performed by: PSYCHIATRY & NEUROLOGY

## 2018-12-19 PROCEDURE — 25000132 ZZH RX MED GY IP 250 OP 250 PS 637: Performed by: PSYCHIATRY & NEUROLOGY

## 2018-12-19 PROCEDURE — 99231 SBSQ HOSP IP/OBS SF/LOW 25: CPT | Performed by: PHYSICIAN ASSISTANT

## 2018-12-19 RX ADMIN — HALOPERIDOL 5 MG: 5 TABLET ORAL at 10:14

## 2018-12-19 RX ADMIN — DIPHENHYDRAMINE HYDROCHLORIDE 50 MG: 50 CAPSULE ORAL at 18:03

## 2018-12-19 RX ADMIN — OLANZAPINE 10 MG: 10 TABLET, ORALLY DISINTEGRATING ORAL at 21:33

## 2018-12-19 RX ADMIN — LORAZEPAM 2 MG: 2 TABLET ORAL at 18:03

## 2018-12-19 RX ADMIN — HALOPERIDOL 5 MG: 5 TABLET ORAL at 18:03

## 2018-12-19 ASSESSMENT — ACTIVITIES OF DAILY LIVING (ADL)
ORAL_HYGIENE: INDEPENDENT
HYGIENE/GROOMING: INDEPENDENT
DRESS: SCRUBS (BEHAVIORAL HEALTH)
LAUNDRY: WITH SUPERVISION
ORAL_HYGIENE: INDEPENDENT
HYGIENE/GROOMING: INDEPENDENT
DRESS: INDEPENDENT

## 2018-12-19 NOTE — PROGRESS NOTES
Pt remained in room throughout the evening shift, majority of the shift spent resting in bed or watching TV calmly.  Pt did have an episode after dinner where she became quite angry and agitated, throwing her dinner plate and liquids and her room door and yelling insults about staff for an extensive period of time.  Pt was upset about completing and turning in paperwork.  Pt took oral medication, continued to yell,  However after talking with another staff eventually calmed down and had no further incidents the rest of the evening.     12/18/18 2200   Behavioral Health   Hallucinations denies / not responding to hallucinations   Thinking poor concentration   Orientation person: oriented;place: oriented;date: oriented;time: oriented   Memory baseline memory   Insight poor   Judgement impaired   Eye Contact at examiner   Affect angry;blunted, flat;tense;irritable   Mood labile;mood is calm;irritable   Physical Appearance/Attire disheveled   Hygiene neglected grooming - unclean body, hair, teeth   1. Wish to be Dead No   2. Non-Specific Active Suicidal Thoughts  No   Self Injury other (see comment)  (nothing stated or observed)   Elopement Statements about wanting to leave   Activity isolative;hyperactive (agitated, impulsive)   Speech pressured;clear;coherent   Medication Sensitivity no stated side effects;no observed side effects   Coping/Psychosocial   Verbalized Emotional State anger;frustration   Activities of Daily Living   Hygiene/Grooming independent   Oral Hygiene independent   Dress independent   Laundry unable to complete   Room Organization independent

## 2018-12-19 NOTE — PROGRESS NOTES
During my attempted visit, pt sleeping.    Will continue to provide support to pt/family during their hospitalization at least 1x/wk.
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
## 2018-12-19 NOTE — PLAN OF CARE
Pt continues to have symptoms of psychosis and behavioral disturbance. Pt's overall condition is improving in the last 48 hours. Pt has shown improvement in symptoms of psychosis, delusional thinking and aggressive behaviors. Aggression towards pts and staff is absent the last 12 hours and SIB is absent for about 24 hours. Pt shows no remorse for her previous behaviors and is unable to take any blame instead stating that staff were being disrespectful to her. Pt's insight remains very poor and she does not believe that she should remain in the hospital. Pt's judgement remains impaired. Pt did not report physical health concerns or side effects from medications this shift. Pt was medication compliant.

## 2018-12-19 NOTE — PROGRESS NOTES
"Patient has had one episode of yelling and shouting tonight due to the fact that she was not allowed a pen. She was verbally threatening staff's lives. She made statements such as, \"I'll get you, just wait until I get out of here, I get you on the outside.\" She also was making racial statements towards all staff members. She made this threat to several staff. Her verbal aggression and threats continued for 30-45 minutes.   "

## 2018-12-19 NOTE — PROGRESS NOTES
[facility], [place]   Psychiatric Progress Note  Ilene Morgan Aided  [id number]  12/19/18    Chief Complaint: Continued medical care          Interim History:   The patient's care was discussed with the treatment team during the daily team meeting and/or staff's chart notes were reviewed.  Staff report patient has been watching television became upset about her paperwork throwing food at staff when they would not allow her to have something to write with.  She slept about 6 hours and completed an x-ray.    Upon visiting with her she says that she is okay he the x-ray was unrevealing she has not had any abdominal pain or any further troubles with swallowing objects.  She has been trying to be respectful and maintain her behaviors unit.  She denies any thoughts of harming herself or others any hallucinations or paranoia or any hopelessness or helplessness or attention or concentration deficits.  She does not have any energy issues and is sleeping well.  She would like us to look into the Social Security paperwork and she was informed we are doing that and she was inquiring about if court had occurred or not.  Informed her that I would ask and it seems that we have not received a call back from the court.  Reminded her about behavioral expectations of the unit and that that will be how she is discharged in the future.         Medications:       famotidine  20 mg Oral Once     haloperidol  5 mg Oral BID    Or     haloperidol lactate  5 mg Intramuscular BID          Allergies:   No Known Allergies       Labs:   No results found for this or any previous visit (from the past 24 hour(s)).       Psychiatric Examination:     /70   Pulse 85   Temp 98  F (36.7  C)   Resp 16   Wt 92.3 kg (203 lb 8 oz)   LMP 10/31/2018 (Approximate)   SpO2 (!) 77%   BMI 32.85 kg/m    Weight is 203 lbs 8 oz  Body mass index is 32.85 kg/m .                Sitting Orthostatic BP: 118/57      Sitting  Orthostatic Pulse: 90 bpm      Standing Orthostatic BP: 108/65      Standing Orthostatic Pulse: 97 bpm       Weight over time:  Vitals:    12/08/18 0900   Weight: 92.3 kg (203 lb 8 oz)       Orthostatic Vitals       Most Recent      Sitting Orthostatic /57 12/18 1600    Sitting Orthostatic Pulse (bpm) 90 12/18 1600    Standing Orthostatic /65 12/18 1600    Standing Orthostatic Pulse (bpm) 97 12/18 1600            Ilene is a 28-year-old female with short black hair and is overweight.  Her speech is of an appropriate rate and tone and her language is intact.  Her behavior is pacing at times.  Her affect is irritable but calming.  Her mood she describes as ok.  Her thoughts consists of the above without thoughts of harming herself or others or delusional content.  Thought process is ruminative without looseness of association.  She may have abnormal perceptions at times.  She is alert and aware of her current location and circumstances.  Her attention and concentration appear limited.  Her cognition and fund of knowledge appear below average.  Her long-term/short-term/remote memory appear limited.  Her insight and judgment are both impaired.         Precautions:     Behavioral Orders   Procedures     Assault precautions     Code 1 - Restrict to Unit     Routine Programming     As clinically indicated     Status 15     Every 15 minutes.     Status Individual Observation     2:1 one male and one female     Order Specific Question:   CONTINUOUS 24 hours / day     Answer:   Other     Order Specific Question:   Specify distance     Answer:   10 feet from others     Order Specific Question:   Indications for SIO     Answer:   Self-injury risk     Order Specific Question:   Indications for SIO     Answer:   Suicide risk     Order Specific Question:   Indications for SIO     Answer:   Assault risk     Suicide precautions     Patients on Suicide Precautions should have a Combination Diet ordered that includes a Diet  selection(s) AND a Behavioral Tray selection for Safe Tray - with utensils, or Safe Tray - NO utensils            DIagnoses:     Schizoaffective disorder bipolar type  Can use disorder  Tobacco use disorder  Cannabis use disorder  Rule out intellectual disability  Rule out personality disorder         Assessment & Plan:     Ilene has been attempting to manage her behaviors she has been marginal with the success of this.  We discussed a behavioral expectations of the unit and how she is going to get out of the hospital.  She does not appear to have any side effects from the medications and I suspect that she has passed the objects that she swallowed.  We will continue therapeutic interventions in an effort to improve her behaviors continue with current medications at this time.    Currently committed with Aranda of haloperidol, risperidone, chlorpromazine, clozapine    The risks, benefits, alternatives and side effects have been discussed and are understood by the patient and other caregivers.      Nathan Vincent  [facility]      The following document has been created with voice recognition software and may contain unintentional word substitutions.    Non clinically relevant CMS requirements:  Clinical Global Impressions  First:  Considering your total clinical experience with this particular patient population, how severe are the patient's symptoms at this time?: 7 (11/28/18 1001)  Compared to the patient's condition at the START of treatment, this patient's condition is:: 4 (11/28/18 1001)  Most recent:  Considering your total clinical experience with this particular patient population, how severe are the patient's symptoms at this time?: 5 (12/18/18 1443)  Compared to the patient's condition at the START of treatment, this patient's condition is:: 3 (12/18/18 1443)

## 2018-12-19 NOTE — PROGRESS NOTES
Brief medicine note:  S: Pt denies abd pain. States LBM this am and was normal. States did BM yesterday that had bright red blood but did not see pencil fragment. She is not saving BM in toilet like staff have asked so unclear whether or not pt has passed it in stool. Per staff, eating and drinking appropriately. AXR this am did not reveal any radiodense object.     O: GEN: In NAD  Blood pressure 117/70, pulse 85, temperature 98  F (36.7  C), resp. rate 16, weight 92.3 kg (203 lb 8 oz), last menstrual period 10/31/2018  ABD: Soft, ND    Recent Results (from the past 24 hour(s))   XR Abdomen Port 1 View    Narrative    EXAMINATION:  XR ABDOMEN PORT 1 VW 12/19/2018 10:54 AM     INDICATION: F/u recently swallowed pencil    COMPARISON: CT abdomen 12/16/2018    FINDINGS: Single AP supine view of the abdomen. Nonobstructive bowel  gas pattern. No radiodense foreign body is identified; of note the  radial dense foreign body identified by CT on 12/15/2018 was not well  visualized on prior abdominal plain film. No evidence of pneumatosis  or portal venous gas. The lung bases are unremarkable.       Impression    IMPRESSION:  1. No radiodense foreign body is identified.  2. Nonobstructive bowel gas pattern.    I have personally reviewed the examination and initial interpretation  and I agree with the findings.    EULOGIO PAZ MD       ASSESSMENT:  Recent swallowing of pencil fragment. Pt is showing no s/s of sequela of ingestion and possibly passed it yesterday given she admitted she had bright red blood with a bowel movement then but none since.     PLAN:  No medical intervention indicated. Please continue to monitor pt's GI status and alert medicine if pt starts to c/o abd pain, abd distension, melena or hematochezia. Medicine signing off. Please feel free to call with questions.     Justin Burrell PA-C  Internal Medicine Hospitalist   [facility]  [phone]

## 2018-12-20 PROCEDURE — 25000132 ZZH RX MED GY IP 250 OP 250 PS 637: Performed by: NURSE PRACTITIONER

## 2018-12-20 PROCEDURE — 12400003 ZZH R&B MH CRITICAL UMMC

## 2018-12-20 PROCEDURE — 25000132 ZZH RX MED GY IP 250 OP 250 PS 637: Performed by: PSYCHIATRY & NEUROLOGY

## 2018-12-20 RX ADMIN — OLANZAPINE 10 MG: 10 TABLET, ORALLY DISINTEGRATING ORAL at 21:58

## 2018-12-20 RX ADMIN — HALOPERIDOL 5 MG: 5 TABLET ORAL at 08:42

## 2018-12-20 RX ADMIN — HALOPERIDOL 5 MG: 5 TABLET ORAL at 19:20

## 2018-12-20 ASSESSMENT — ACTIVITIES OF DAILY LIVING (ADL)
ORAL_HYGIENE: INDEPENDENT
DRESS: INDEPENDENT
HYGIENE/GROOMING: INDEPENDENT
LAUNDRY: UNABLE TO COMPLETE

## 2018-12-20 NOTE — PROGRESS NOTES
Pt remained in her room on restriction this evening.  Without prompt, pt asked to shower, brush her teeth, comb hair, etc.  This was completed without incident.  Ilene was very patient this evening, having to wait nearly two hours until she was able to come out and shower due to understaffing.  Pt was also very polite when receiving medications, meals, vitals.  She denied SI/SIB and psychotic symptoms.  No behavioral concerns this shift.     12/19/18 2200   Behavioral Health   Hallucinations denies / not responding to hallucinations   Thinking distractable   Orientation person: oriented;place: oriented;date: oriented   Memory baseline memory   Insight poor   Judgement impaired   Eye Contact at examiner   Affect blunted, flat   Mood mood is calm   Physical Appearance/Attire attire appropriate to age and situation   Hygiene well groomed   1. Wish to be Dead No   2. Non-Specific Active Suicidal Thoughts  No   Self Injury other (see comment)  (pt denied )   Elopement (Nothing stated)   Activity isolative   Speech clear;coherent   Medication Sensitivity no stated side effects;no observed side effects   Psychomotor / Gait balanced;steady   Activities of Daily Living   Hygiene/Grooming independent   Oral Hygiene independent   Dress independent   Laundry with supervision   Room Organization independent

## 2018-12-20 NOTE — PROGRESS NOTES
Pt's court hearing was continued until Jan 2nd at 3pm.  We can continue giving her Haldol or whatever emergency neuroleptic is necessary (per assistant Aleena Garcia Atty, )

## 2018-12-21 PROCEDURE — 12400003 ZZH R&B MH CRITICAL UMMC

## 2018-12-21 PROCEDURE — 25000132 ZZH RX MED GY IP 250 OP 250 PS 637: Performed by: NURSE PRACTITIONER

## 2018-12-21 PROCEDURE — 25000132 ZZH RX MED GY IP 250 OP 250 PS 637: Performed by: PSYCHIATRY & NEUROLOGY

## 2018-12-21 PROCEDURE — 99233 SBSQ HOSP IP/OBS HIGH 50: CPT | Performed by: PSYCHIATRY & NEUROLOGY

## 2018-12-21 RX ADMIN — HALOPERIDOL 5 MG: 5 TABLET ORAL at 18:37

## 2018-12-21 RX ADMIN — HALOPERIDOL 5 MG: 5 TABLET ORAL at 09:27

## 2018-12-21 RX ADMIN — ACETAMINOPHEN 650 MG: 325 TABLET, FILM COATED ORAL at 19:47

## 2018-12-21 RX ADMIN — OLANZAPINE 10 MG: 10 TABLET, ORALLY DISINTEGRATING ORAL at 19:45

## 2018-12-21 ASSESSMENT — ACTIVITIES OF DAILY LIVING (ADL)
LAUNDRY: UNABLE TO COMPLETE
HYGIENE/GROOMING: INDEPENDENT
DRESS: INDEPENDENT
ORAL_HYGIENE: INDEPENDENT
ORAL_HYGIENE: INDEPENDENT
HYGIENE/GROOMING: SHOWER
DRESS: SCRUBS (BEHAVIORAL HEALTH)

## 2018-12-21 NOTE — PLAN OF CARE
Ilene's 2:1 staff was downgraded during the day shift to a 1:1 staffing. She remained in her room this evening.  Her affect is irritable especially when talking about discharge. She took her scheduled medication with any issue. Her court hearing was continued until Jan 2nd at 3 pm. Pt swallowed a pencil few days ago and now under staff close monitoring by staff. She has not had any abdominal pain or discomfort. Pt is eating and drinking well without any discomfort. She described her mood as ?ok? this evening.  She denied any thoughts of harming herself or others or delusional content. Pt is on close SIO monitoring. See restriction on the team sheet.

## 2018-12-21 NOTE — PROGRESS NOTES
"   12/21/18 1144   Significant Event   Significant Event Other (see comments)  (shift summary)   Pt had an uneventful shift. She was cooperative with space restriction though expressed frustration with it. In the afternoon pt asked to take a shower. This was able to be facilitated and pt was compliant and accepting of all safety measures and supplies restrictions in accordance with her 1:1. Pt was calm the whole shift.. No behavior issues and when asked about swallowing pencils she stated, \"I'll never do THAT again!\"  "

## 2018-12-22 ENCOUNTER — MEDICAL CORRESPONDENCE (OUTPATIENT)
Dept: HEALTH INFORMATION MANAGEMENT | Facility: CLINIC | Age: 28
End: 2018-12-22

## 2018-12-22 LAB
ALBUMIN UR-MCNC: NEGATIVE MG/DL
APPEARANCE UR: CLEAR
BILIRUB UR QL STRIP: NEGATIVE
COLOR UR AUTO: YELLOW
GLUCOSE UR STRIP-MCNC: NEGATIVE MG/DL
HGB UR QL STRIP: NEGATIVE
KETONES UR STRIP-MCNC: NEGATIVE MG/DL
LEUKOCYTE ESTERASE UR QL STRIP: NEGATIVE
MUCOUS THREADS #/AREA URNS LPF: PRESENT /LPF
NITRATE UR QL: NEGATIVE
PH UR STRIP: 7 PH (ref 5–7)
RBC #/AREA URNS AUTO: 1 /HPF (ref 0–2)
SOURCE: ABNORMAL
SP GR UR STRIP: 1.02 (ref 1–1.03)
SPECIMEN SOURCE: ABNORMAL
SQUAMOUS #/AREA URNS AUTO: <1 /HPF (ref 0–1)
UROBILINOGEN UR STRIP-MCNC: NORMAL MG/DL (ref 0–2)
WBC #/AREA URNS AUTO: <1 /HPF (ref 0–5)
WET PREP SPEC: ABNORMAL

## 2018-12-22 PROCEDURE — 25000132 ZZH RX MED GY IP 250 OP 250 PS 637: Performed by: PSYCHIATRY & NEUROLOGY

## 2018-12-22 PROCEDURE — 12400003 ZZH R&B MH CRITICAL UMMC

## 2018-12-22 PROCEDURE — 81001 URINALYSIS AUTO W/SCOPE: CPT | Performed by: PHYSICIAN ASSISTANT

## 2018-12-22 PROCEDURE — 87210 SMEAR WET MOUNT SALINE/INK: CPT | Performed by: PHYSICIAN ASSISTANT

## 2018-12-22 PROCEDURE — 25000132 ZZH RX MED GY IP 250 OP 250 PS 637: Performed by: PHYSICIAN ASSISTANT

## 2018-12-22 RX ORDER — METRONIDAZOLE 500 MG/1
500 TABLET ORAL 2 TIMES DAILY
Status: COMPLETED | OUTPATIENT
Start: 2018-12-22 | End: 2018-12-29

## 2018-12-22 RX ADMIN — DIPHENHYDRAMINE HYDROCHLORIDE 50 MG: 50 CAPSULE ORAL at 18:48

## 2018-12-22 RX ADMIN — HALOPERIDOL 5 MG: 5 TABLET ORAL at 18:48

## 2018-12-22 RX ADMIN — HALOPERIDOL 5 MG: 5 TABLET ORAL at 09:41

## 2018-12-22 RX ADMIN — METRONIDAZOLE 500 MG: 500 TABLET ORAL at 19:02

## 2018-12-22 ASSESSMENT — ACTIVITIES OF DAILY LIVING (ADL)
ORAL_HYGIENE: INDEPENDENT
DRESS: SCRUBS (BEHAVIORAL HEALTH)
ORAL_HYGIENE: INDEPENDENT
LAUNDRY: UNABLE TO COMPLETE
HYGIENE/GROOMING: INDEPENDENT
LAUNDRY: UNABLE TO COMPLETE
DRESS: SCRUBS (BEHAVIORAL HEALTH)
HYGIENE/GROOMING: INDEPENDENT

## 2018-12-22 NOTE — PROGRESS NOTES
The patient continues on SIO with 10-feet space restriction for SIB. She has been sleeping mostly tonight. Slept for 6 hours. No SIB was noted or reported by staff. Will continue to monitor for safety.

## 2018-12-22 NOTE — PLAN OF CARE
Brief medicine note:  - Pt's UA neg but wet prep + for clue cells so bacterial vaginosis likely the cause of her abnormal vaginal sxs. Will start metronidazole 500 mg BID x 7 days to treat. Medicine signing off. Please feel free to call with questions.       Justin Burrell PA-C  Internal Medicine Hospitalist   [facility]  [phone]

## 2018-12-22 NOTE — CONSULTS
Brief medicine consult note:  - Pt reportedly again with foul smelling vag discharge. Similar complaint on 12/5 of which wet prep was negative. Will reorder along with UA and follow up on results. If negative, medicine signing off and please encourage frequent showers and proper hygiene. Please feel free to call with questions.     Justin Burrell PA-C  Internal Medicine Hospitalist   [facility]  [phone]

## 2018-12-22 NOTE — PROGRESS NOTES
Pt continues on SIO staffing for safety due to recent SIB and aggressive behaviors. She had one brief period of irritability this evening after thinking that staff were talking about her, though she was calm the rest of the shift. When meeting with Dr Lyons, she requested to have headphones available for use. She discussed with Physician and writer that she needs to show safe behaviors, otherwise headphones would be taken away. She denies SI/SIB and HI and agreed to plan. She was asking about discharge this evening. Pt was reminded that her goal and our goal is for her to be able to discharge, but in order for that to happen, she has to show safe and calm behaviors consistently. She verbalized understanding and stated she wouldn't engage in behaviors such as swallowing items or being aggressive towards others. She was compliant with scheduled medications and denies any side effects. Requested prn Zyprexa for anxiety at HS, stating that it also helps her sleep. Will continue to monitor closely.

## 2018-12-22 NOTE — PROGRESS NOTES
[facility], [place]   Psychiatric Progress Note  Ilene Liu  [id number]  12/21/18    Chief Complaint: Continued medical care          Interim History:   The patient's care was discussed with the treatment team during the daily team meeting and/or staff's chart notes were reviewed.  Staff report patient has been irritable but behaving herself on the Unit [facility] January second and has not had any disruptive behaviors.  Slept about 6 hours.    Upon visiting with her says that she is feeling good today denying any auditory hallucinations or any paranoia or self injury urges.  She does not have any side effects related to her medications or attention or concentration issues and is sleeping well with the olanzapine she takes at night.  For some reason she does not sleep well just on the haloperidol.  She denies any thoughts of harming herself or others or any hopelessness or helplessness guilty feelings or grandiose ideas.  She is not sad and not having any energy deficits.  She is interested in getting some headphones and also describes a vaginal odor and discharge that she would like assistance with.    It seems internal medicine has already examined this but said if symptoms continue that they would be glad to come see her and potentially get OB/GYN to do an examination.  Discussed with staff her ability to have headphones and demonstrate her good behaviors without swallowing objects and potentially we could progress her off of status individual observation.         Medications:       famotidine  20 mg Oral Once     haloperidol  5 mg Oral BID    Or     haloperidol lactate  5 mg Intramuscular BID          Allergies:   No Known Allergies       Labs:   No results found for this or any previous visit (from the past 24 hour(s)).       Psychiatric Examination:     /79   Pulse 96   Temp 97.9  F (36.6  C) (Oral)   Resp 16   Wt 92.3 kg (203 lb 8 oz)   LMP 10/31/2018  (Approximate)   SpO2 (!) 77%   BMI 32.85 kg/m    Weight is 203 lbs 8 oz  Body mass index is 32.85 kg/m .                Sitting Orthostatic BP: 127/79      Sitting Orthostatic Pulse: 78 bpm      Standing Orthostatic BP: 126/76      Standing Orthostatic Pulse: 90 bpm       Weight over time:  Vitals:    12/08/18 0900   Weight: 92.3 kg (203 lb 8 oz)       Orthostatic Vitals       Most Recent      Sitting Orthostatic /79 12/21 1632    Sitting Orthostatic Pulse (bpm) 78 12/21 1632    Standing Orthostatic /76 12/21 1632    Standing Orthostatic Pulse (bpm) 90 12/21 1632               Ilene is a 28-year-old female with short black hair and is overweight.  Her speech is of an appropriate rate and tone and her language is intact.  Her behavior is pacing at times.  Her affect is calm.  Her mood she describes as good.  Her thoughts consists of the above without thoughts of harming herself or others or delusional content.  Thought process is less ruminative without looseness of association.  She may have abnormal perceptions at times.  She is alert and aware of her current location and circumstances.  Her attention and concentration appear limited.  Her cognition and fund of knowledge appear below average.  Her long-term/short-term/remote memory appear limited.  Her insight and judgment are both impaired.         Precautions:     Behavioral Orders   Procedures     Assault precautions     Code 1 - Restrict to Unit     Routine Programming     As clinically indicated     Status 15     Every 15 minutes.     Status Individual Observation     1:1     Order Specific Question:   CONTINUOUS 24 hours / day     Answer:   Other     Order Specific Question:   Specify distance     Answer:   10 feet from others     Order Specific Question:   Indications for SIO     Answer:   Self-injury risk     Order Specific Question:   Indications for SIO     Answer:   Suicide risk     Order Specific Question:   Indications for SIO     Answer:    Assault risk     Suicide precautions     Patients on Suicide Precautions should have a Combination Diet ordered that includes a Diet selection(s) AND a Behavioral Tray selection for Safe Tray - with utensils, or Safe Tray - NO utensils            DIagnoses:     Questionable schizoaffective disorder bipolar type  Cocaine use disorder  Tobacco use disorder  Cannabis use disorder  Rule out intellectual disability  Borderline personality disorder         Assessment & Plan:     Ilene has been controlling her behaviors well on the unit and has not had any disruptive behaviors.  She does continue to have some irritability though her medications seem to have been beneficial in terms of psychotic symptoms.  The more I get to know her the more personality disorder seems to be evident and perhaps all of her substance use was the culprit of her psychotic symptoms though this would take further evaluation and digging in her past.  Will be slowly progressing to give her more privileges on the unit and will start referring her for facilities.    Currently committed with Aranda of haloperidol, risperidone, chlorpromazine, clozapine    The risks, benefits, alternatives and side effects have been discussed and are understood by the patient and other caregivers.      Nathan Vincent  [facility]      The following document has been created with voice recognition software and may contain unintentional word substitutions.    Non clinically relevant CMS requirements:  Clinical Global Impressions  First:  Considering your total clinical experience with this particular patient population, how severe are the patient's symptoms at this time?: 7 (11/28/18 1001)  Compared to the patient's condition at the START of treatment, this patient's condition is:: 4 (11/28/18 1001)  Most recent:  Considering your total clinical experience with this particular patient population, how severe are the patient's symptoms at this  time?: 5 (12/18/18 1443)  Compared to the patient's condition at the START of treatment, this patient's condition is:: 3 (12/18/18 1443)

## 2018-12-23 PROCEDURE — 12400003 ZZH R&B MH CRITICAL UMMC

## 2018-12-23 PROCEDURE — 25000132 ZZH RX MED GY IP 250 OP 250 PS 637: Performed by: PSYCHIATRY & NEUROLOGY

## 2018-12-23 PROCEDURE — 25000132 ZZH RX MED GY IP 250 OP 250 PS 637: Performed by: PHYSICIAN ASSISTANT

## 2018-12-23 RX ADMIN — HALOPERIDOL 5 MG: 5 TABLET ORAL at 19:09

## 2018-12-23 RX ADMIN — DIPHENHYDRAMINE HYDROCHLORIDE 50 MG: 50 CAPSULE ORAL at 19:09

## 2018-12-23 RX ADMIN — METRONIDAZOLE 500 MG: 500 TABLET ORAL at 19:09

## 2018-12-23 RX ADMIN — HALOPERIDOL 5 MG: 5 TABLET ORAL at 08:55

## 2018-12-23 RX ADMIN — METRONIDAZOLE 500 MG: 500 TABLET ORAL at 08:55

## 2018-12-23 ASSESSMENT — ACTIVITIES OF DAILY LIVING (ADL)
DRESS: SCRUBS (BEHAVIORAL HEALTH)
LAUNDRY: UNABLE TO COMPLETE
HYGIENE/GROOMING: INDEPENDENT
LAUNDRY: UNABLE TO COMPLETE
DRESS: SCRUBS (BEHAVIORAL HEALTH)
ORAL_HYGIENE: INDEPENDENT
ORAL_HYGIENE: INDEPENDENT
HYGIENE/GROOMING: INDEPENDENT

## 2018-12-23 NOTE — PROGRESS NOTES
Patient was withdrawn and isolative to room. Pt was tearful after speaking with family on the telephone. Pt returned to her room when the call ended and remained there for the rest of the evening. She showered; no visitors. No seclusions or restraints.        12/22/18 2300   Behavioral Health   Thinking distractable   Orientation person: oriented   Memory baseline memory   Insight poor   Judgement impaired   Eye Contact into space;at examiner   Affect blunted, flat   Mood mood is calm;other (see comments)  (sad at times)   Physical Appearance/Attire untidy   Suicidality other (see comments)  (none reported)   1. Wish to be Dead No   2. Non-Specific Active Suicidal Thoughts  No   Self Injury other (see comment)  (none reported or observed)   Activity withdrawn   Speech clear;coherent   Medication Sensitivity no stated side effects;no observed side effects   Psychomotor / Gait balanced;steady   Activities of Daily Living   Hygiene/Grooming independent   Oral Hygiene independent   Dress scrubs (behavioral health)   Laundry unable to complete   Room Organization independent   Activity   Activity Assistance Provided independent

## 2018-12-23 NOTE — PROGRESS NOTES
Patient requesting headphones and given opportunity to use with SIO present, however pt then decided she did not want them and wanted to continue reading her book. Patient has been calm and cooperative. Patient showered and independently completed her ADLs Asking if there are any updates to her discharge plan and IRTS referrals. Patient states she hope she does not have to remain in the hospital past the new years. Denies any GI/ s/s. Denies SI/SIB or thoughts to harm others. Presents with a blunted, but bright affect upon approach. Medication compliant. Continue to monitor and assess.

## 2018-12-24 PROCEDURE — G0177 OPPS/PHP; TRAIN & EDUC SERV: HCPCS

## 2018-12-24 PROCEDURE — 99232 SBSQ HOSP IP/OBS MODERATE 35: CPT | Performed by: PSYCHIATRY & NEUROLOGY

## 2018-12-24 PROCEDURE — 12400003 ZZH R&B MH CRITICAL UMMC

## 2018-12-24 PROCEDURE — 25000132 ZZH RX MED GY IP 250 OP 250 PS 637: Performed by: PSYCHIATRY & NEUROLOGY

## 2018-12-24 PROCEDURE — 25000132 ZZH RX MED GY IP 250 OP 250 PS 637: Performed by: NURSE PRACTITIONER

## 2018-12-24 PROCEDURE — 25000132 ZZH RX MED GY IP 250 OP 250 PS 637: Performed by: PHYSICIAN ASSISTANT

## 2018-12-24 RX ADMIN — OLANZAPINE 10 MG: 10 TABLET, ORALLY DISINTEGRATING ORAL at 21:09

## 2018-12-24 RX ADMIN — HALOPERIDOL 5 MG: 5 TABLET ORAL at 09:40

## 2018-12-24 RX ADMIN — DIPHENHYDRAMINE HYDROCHLORIDE 50 MG: 50 CAPSULE ORAL at 18:09

## 2018-12-24 RX ADMIN — METRONIDAZOLE 500 MG: 500 TABLET ORAL at 19:53

## 2018-12-24 RX ADMIN — METRONIDAZOLE 500 MG: 500 TABLET ORAL at 09:40

## 2018-12-24 RX ADMIN — HALOPERIDOL 5 MG: 5 TABLET ORAL at 19:53

## 2018-12-24 ASSESSMENT — ACTIVITIES OF DAILY LIVING (ADL)
HYGIENE/GROOMING: INDEPENDENT
ORAL_HYGIENE: INDEPENDENT
ORAL_HYGIENE: INDEPENDENT
HYGIENE/GROOMING: INDEPENDENT
DRESS: SCRUBS (BEHAVIORAL HEALTH)
DRESS: SCRUBS (BEHAVIORAL HEALTH)

## 2018-12-24 NOTE — PROGRESS NOTES
IRT's referrals were faxed today to:  -[facility]  -[facility]  -[facility]  -[facility]

## 2018-12-24 NOTE — PLAN OF CARE
Pt continues to have symptoms associated with psychosis and behavioral concerns. Pt's overall status continues to improve in the last 48 hours. Pt is not overtly psychotic. She remains isolative and somewhat withdrawn. Pt is doing well with her restrictions. She became upset briefly this shift when RN informed her that she would not be getting off the SIO today, she quickly deescalated though. Pt is really hopeful to get off it tomorrow. Pt did well with a regular diet with utensils and attended group appropriately this shift. Will pass along to following shift to follow-up with the on-call in the morning, if she continues to do well. Pt adamantly denies thoughts of hurting herself or others at this time. Pt took her medications without issue. She continues to decline vital signs during the AM shift. Pt continues on antibiotic for bacterial vaginosis. Pt reported no other physical health concerns or side effects from medications.

## 2018-12-24 NOTE — PROGRESS NOTES
"Pt continues to show improvement in her behaviors and demeanor. Upon check-in she was pleasant upon approach. Pt states she is \"feeling much better\" and states, \"I feel like the meds are kicking in\". She expressed her strong desire to leave the hospital, stating \"I just want to get out of here. I want to find out when I can go home. The doctor said they would send out a referral to an IRTS tomorrow\". She remains isolative and withdrawn, leaving her room very few times this shift. She seems to be better able to regulate her emotions and is far less irritable. Although she did not make any paranoid statements today, she continues to appear paranoid and/or anxious based on her non-verbal behavior. She continues to be very guarded and seems especially tense when she needs to leave her room in order to make a phone call, etc. However, she graciously allowed staff to perform a room-check without becoming agitated, which is a clear indication of her progress given her extreme paranoia upon admission. Pt denies SI/SIB/HI. Pt also denies AVH.   "

## 2018-12-24 NOTE — PROGRESS NOTES
Pt attended 2 OT group in the morning, and a few minutes of the afternoon OT session.    Pt was readily cooperative with her space restriction in the morning.  Pleasant and cooperative.  Generally quiet, though more talkative as writer initiated conversation.  Pt spoke of how bright and well behaved her daughter is, and her own interest in music and crafts.    Pt thanked writer for group, smiled and acknowledged the positive feedback I was able to give her.

## 2018-12-25 PROCEDURE — 25000132 ZZH RX MED GY IP 250 OP 250 PS 637: Performed by: PHYSICIAN ASSISTANT

## 2018-12-25 PROCEDURE — 12400003 ZZH R&B MH CRITICAL UMMC

## 2018-12-25 PROCEDURE — G0177 OPPS/PHP; TRAIN & EDUC SERV: HCPCS

## 2018-12-25 PROCEDURE — 25000132 ZZH RX MED GY IP 250 OP 250 PS 637: Performed by: NURSE PRACTITIONER

## 2018-12-25 PROCEDURE — 25000132 ZZH RX MED GY IP 250 OP 250 PS 637: Performed by: PSYCHIATRY & NEUROLOGY

## 2018-12-25 RX ADMIN — HALOPERIDOL 5 MG: 5 TABLET ORAL at 19:43

## 2018-12-25 RX ADMIN — OLANZAPINE 10 MG: 10 TABLET, ORALLY DISINTEGRATING ORAL at 21:01

## 2018-12-25 RX ADMIN — METRONIDAZOLE 500 MG: 500 TABLET ORAL at 08:48

## 2018-12-25 RX ADMIN — HALOPERIDOL 5 MG: 5 TABLET ORAL at 08:48

## 2018-12-25 RX ADMIN — METRONIDAZOLE 500 MG: 500 TABLET ORAL at 19:43

## 2018-12-25 ASSESSMENT — ACTIVITIES OF DAILY LIVING (ADL)
HYGIENE/GROOMING: INDEPENDENT
DRESS: SCRUBS (BEHAVIORAL HEALTH)
HYGIENE/GROOMING: INDEPENDENT
ORAL_HYGIENE: INDEPENDENT
DRESS: INDEPENDENT
ORAL_HYGIENE: INDEPENDENT

## 2018-12-25 NOTE — PLAN OF CARE
OT General Care Plan  OT Goal 1  Description  Work with pt to increase awareness of how symptoms can impact performance on goal-directed tasks and life management skills. Will provide opportunities to build on coping strategies to manage symptoms during hospitalization and after d/c.    Pt attended OT clinic group, was able to initiate task and ask for help as needed. Pt demonstrated good planning, task focus, and problem solving. Appeared comfortable interacting with peers.       12/25/2018 1520 - Improving by Love Pak, OT

## 2018-12-25 NOTE — PROGRESS NOTES
"[facility], [place]   Psychiatric Progress Note  Ilene Morgan Aided  [id number]          Interim History:   The patient's care was discussed with the treatment team during the daily team meeting and/or staff's chart notes were reviewed.  Staff report patient has been irritable but has not had significant behavior problems over the past 3 days.  She remains on SIO d/t swallowing a pencil last week during an episode of agitation.  She has been telling staff that the SIO is bothersome and that she wants it discontinued.  She tells me today that she hasn't been able to sleep because someone is always watching her.  She says she feels like a child having to be watched that way.  She says she swallowed the pencil in an effort to avoid being restrained \"but it didn't work, they did it anyway.\" She says she didn't want to hurt herself and has never swallowed objects before.  She adamantly denies having any desire to swallow anything since then.  She says her mood has been \"good\" lately and she denies SE's.  She denies having SI or HI.  She endorses having anxiety but relates this to having the SIO staff watching her.  The patient had no further complaints or requests.          Medications:       famotidine  20 mg Oral Once     haloperidol  5 mg Oral BID    Or     haloperidol lactate  5 mg Intramuscular BID     metroNIDAZOLE  500 mg Oral BID          Allergies:   No Known Allergies       Labs:   No results found for this or any previous visit (from the past 24 hour(s)).       Psychiatric Examination:     /69   Pulse 89   Temp 98  F (36.7  C) (Tympanic)   Resp 18   Wt 92.3 kg (203 lb 8 oz)   LMP 10/31/2018 (Approximate)   SpO2 99%   BMI 32.85 kg/m    Weight is 203 lbs 8 oz  Body mass index is 32.85 kg/m .                Sitting Orthostatic BP: 127/79      Sitting Orthostatic Pulse: 78 bpm      Standing Orthostatic BP: 126/76      Standing Orthostatic Pulse: 90 bpm       Weight over " time:  Vitals:    12/08/18 0900   Weight: 92.3 kg (203 lb 8 oz)       Orthostatic Vitals       Most Recent      Sitting Orthostatic /79 12/21 1632    Sitting Orthostatic Pulse (bpm) 78 12/21 1632    Standing Orthostatic /76 12/21 1632    Standing Orthostatic Pulse (bpm) 90 12/21 1632        Appearance: alert  Attitude:  cooperative  Eye Contact:  fair  Mood:  anxious  Affect:  constricted and mood congruent  Speech:  no speech abnormalities  Psychomotor Behavior:  no evidence of tardive dyskinesia, dystonia, or tics, and intact station, gait and muscle tone, some fidgeting  Throught Process:  goal oriented  Associations:  no loose associations  Thought Content:  no evidence of suicidal ideation or homicidal ideation and no evidence of psychotic thought  Insight:  fair  Judgement:  Intact  Language: Appropriate  Fund of Knowledge: Average  Oriented to:  time, person, and place  Attention Span and Concentration:  intact  Recent and Remote Memory:  intact              Precautions:     Behavioral Orders   Procedures     Assault precautions     Code 1 - Restrict to Unit     Routine Programming     As clinically indicated     Status 15     Every 15 minutes.     Status Individual Observation     1:1     Order Specific Question:   CONTINUOUS 24 hours / day     Answer:   Other     Order Specific Question:   Specify distance     Answer:   continous monitoring for risk of ingestion.     Order Specific Question:   Indications for SIO     Answer:   Self-injury risk     Order Specific Question:   Indications for SIO     Answer:   Suicide risk     Order Specific Question:   Indications for SIO     Answer:   Assault risk     Suicide precautions     Patients on Suicide Precautions should have a Combination Diet ordered that includes a Diet selection(s) AND a Behavioral Tray selection for Safe Tray - with utensils, or Safe Tray - NO utensils            DIagnoses:     Questionable schizoaffective disorder bipolar  type  Cocaine use disorder  Tobacco use disorder  Cannabis use disorder  Rule out intellectual disability  Borderline personality disorder         Assessment & Plan:     Continue current meds    Ilene has been controlling her behaviors well on the unit and has not had any disruptive behaviors.  She does continue to have some irritability though her medications seem to have been beneficial in terms of psychotic symptoms.  Will be slowly progressing to give her more privileges on the unit and will start referring her for facilities.    Today staff and I agreed to remove the space restriction and dietary restrictions, and if this goes well tomorrow evening it may be reasonable to remove the SIO.      Currently committed with Aranda of haloperidol, risperidone, chlorpromazine, clozapine    Dr. Sumner will resume care on Wednesday.

## 2018-12-25 NOTE — PROGRESS NOTES
Patient had an uneventful shift. She was calm and cooperative throughout the evening. Pt colored while in her room, she was able to use coloring pencils without incident. No other concerns to report.        12/24/18 2100   Behavioral Health   Hallucinations denies / not responding to hallucinations   Orientation person: oriented   Memory baseline memory   Judgement impaired   Eye Contact at examiner   Affect blunted, flat   Mood mood is calm   Physical Appearance/Attire attire appropriate to age and situation   Hygiene well groomed   Suicidality other (see comments)  (none reported )   1. Wish to be Dead No   2. Non-Specific Active Suicidal Thoughts  No   Self Injury other (see comment)  (none reported or observed)   Activity withdrawn   Speech clear;coherent   Medication Sensitivity no stated side effects;no observed side effects   Psychomotor / Gait balanced;steady   Activities of Daily Living   Hygiene/Grooming independent   Oral Hygiene independent   Dress scrubs (behavioral health)   Room Organization independent   Behavioral Health Interventions   Social and Therapeutic Interventions (Psychotic Symptoms) encourage socialization with peers;encourage effective boundaries with peers;encourage participation in therapeutic groups and milieu activities

## 2018-12-26 PROCEDURE — 25000132 ZZH RX MED GY IP 250 OP 250 PS 637: Performed by: PHYSICIAN ASSISTANT

## 2018-12-26 PROCEDURE — 99232 SBSQ HOSP IP/OBS MODERATE 35: CPT | Performed by: PSYCHIATRY & NEUROLOGY

## 2018-12-26 PROCEDURE — 25000132 ZZH RX MED GY IP 250 OP 250 PS 637: Performed by: PSYCHIATRY & NEUROLOGY

## 2018-12-26 PROCEDURE — G0177 OPPS/PHP; TRAIN & EDUC SERV: HCPCS

## 2018-12-26 PROCEDURE — 12400003 ZZH R&B MH CRITICAL UMMC

## 2018-12-26 RX ADMIN — METRONIDAZOLE 500 MG: 500 TABLET ORAL at 07:52

## 2018-12-26 RX ADMIN — HALOPERIDOL 5 MG: 5 TABLET ORAL at 19:37

## 2018-12-26 RX ADMIN — HALOPERIDOL 5 MG: 5 TABLET ORAL at 07:52

## 2018-12-26 RX ADMIN — METRONIDAZOLE 500 MG: 500 TABLET ORAL at 19:37

## 2018-12-26 ASSESSMENT — ACTIVITIES OF DAILY LIVING (ADL)
DRESS: INDEPENDENT
HYGIENE/GROOMING: INDEPENDENT
ORAL_HYGIENE: INDEPENDENT
LAUNDRY: UNABLE TO COMPLETE
DRESS: INDEPENDENT
LAUNDRY: UNABLE TO COMPLETE
HYGIENE/GROOMING: INDEPENDENT
ORAL_HYGIENE: INDEPENDENT

## 2018-12-26 NOTE — PROGRESS NOTES
12/25/18 1500   Behavioral Health   Hallucinations denies / not responding to hallucinations   Thinking intact   Orientation place: oriented;person: oriented;date: oriented;time: oriented   Memory baseline memory   Insight insight appropriate to events;insight appropriate to situation   Judgement intact   Affect full range affect   Mood mood is calm   Physical Appearance/Attire attire appropriate to age and situation;neat   Hygiene well groomed   Suicidality other (see comments)  (denies)   1. Wish to be Dead No   2. Non-Specific Active Suicidal Thoughts  No   Self Injury other (see comment)  (denies)   Elopement (no value)   Activity other (see comment)  (visible in milieu and attended group)   Speech coherent;clear   Medication Sensitivity no observed side effects;no stated side effects   Psychomotor / Gait balanced;steady   Activities of Daily Living   Hygiene/Grooming independent   Oral Hygiene independent   Dress independent   Room Organization independent   Behavioral Health Interventions   Social and Therapeutic Interventions (Psychotic Symptoms) encourage socialization with peers;encourage effective boundaries with peers;encourage participation in therapeutic groups and milieu activities       Patient is calm and cooperative. Pt went to all groups and took a shower and cared for herself. Pt seems bright and stated that she is feeling much better and wants to know when she can discharge. No SI or SIB.

## 2018-12-26 NOTE — PLAN OF CARE
OT General Care Plan  OT Goal 1  Description  Work with pt to increase awareness of how symptoms can impact performance on goal-directed tasks and life management skills. Will provide o  pportunities to build on coping strategies to manage symptoms during hospitalization and after d/c.    Pt continues to decline morning groups when invited, though consistently attends in the afternoon.  Friendly, was the only person in group, social with writer when questions were asked of her.  Talked about moving here from [place], elementary school, living with mom, learning English, and living in [place] from [id number].   12/26/2018 1528 - Improving by Love Pak, OT

## 2018-12-26 NOTE — PROGRESS NOTES
Pt spent most of the evening in her room. Pt was either coloring or reading a book. She was pleasant and polite towards staff. There were no behavioral concerns. Pt was only concerned about discharge.       12/25/18 2100   Behavioral Health   Hallucinations denies / not responding to hallucinations   Thinking intact   Orientation person: oriented;place: oriented   Memory baseline memory   Insight poor   Judgement impaired   Eye Contact at examiner   Affect full range affect   Mood mood is calm   Physical Appearance/Attire appears stated age   Hygiene well groomed   Activities of Daily Living   Hygiene/Grooming independent   Oral Hygiene independent   Dress scrubs (behavioral health)   Room Organization independent

## 2018-12-26 NOTE — PROGRESS NOTES
[facility], [place]   Psychiatric Progress Note  Hospital Day: 28        Interim History:   The patient's care was discussed with the treatment team during the daily team meeting and/or staff's chart notes were reviewed.  Patient has continued to do well and continues to ask to come off of 1:1.    Upon interview, the patient denies hallucinations, suicidal or homicidal thoughts. She denies any thoughts to swallow anything. I ask about her concerns about the illuminati and FBI and instead of becoming defensive as she has in the past, she indicates that she is no longer concerned about these things and believes those thoughts only happen when she is off of medications.    Psychiatric Symptoms: paranoia    Medication side effects reported: None    Other issues reported by patient: None         Medications:       famotidine  20 mg Oral Once     haloperidol  5 mg Oral BID    Or     haloperidol lactate  5 mg Intramuscular BID     metroNIDAZOLE  500 mg Oral BID          Allergies:   No Known Allergies       Labs:     No results found for this or any previous visit (from the past 48 hour(s)).       Psychiatric Examination:     /70   Pulse 94   Temp 97.2  F (36.2  C) (Tympanic)   Resp 16   Wt 92.3 kg (203 lb 8 oz)   LMP 10/31/2018 (Approximate)   SpO2 100%   BMI 32.85 kg/m    Weight is 203 lbs 8 oz  Body mass index is 32.85 kg/m .      Orthostatic Vitals       Most Recent      Sitting Orthostatic BP 96/55 12/26 0816    Sitting Orthostatic Pulse (bpm) 105 12/26 0816    Standing Orthostatic /65 12/26 0816    Standing Orthostatic Pulse (bpm) 110 12/26 0816            Appearance: awake, alert, adequately groomed and dressed in hospital scrubs  Attitude:  cooperative  Eye Contact:  intense  Mood:  better  Affect:  intensity is normal  Speech:  clear, coherent and normal prosody  Language: fluent and intact in English  Psychomotor, Gait, Musculoskeletal:  no evidence of tardive  dyskinesia, dystonia, or tics  Throught Process:  goal oriented  Associations:  no loose associations  Thought Content:  no evidence of suicidal ideation or homicidal ideation and denies hallucinations  Insight:  limited  Judgement:  fair  Oriented to:  time, person, and place  Attention Span and Concentration:  intact  Recent and Remote Memory:  intact  Fund of Knowledge:  adequate    Clinical Global Impressions  First:  Considering your total clinical experience with this particular patient population, how severe are the patient's symptoms at this time?: 7 (11/28/18 1001)  Compared to the patient's condition at the START of treatment, this patient's condition is:: 4 (11/28/18 1001)  Most recent:  Considering your total clinical experience with this particular patient population, how severe are the patient's symptoms at this time?: 7 (12/07/18 1155)  Compared to the patient's condition at the START of treatment, this patient's condition is:: 3 (12/07/18 1155)           Precautions:     Behavioral Orders   Procedures     Assault precautions     Code 1 - Restrict to Unit     Routine Programming     As clinically indicated     Status 15     Every 15 minutes.     Suicide precautions     Patients on Suicide Precautions should have a Combination Diet ordered that includes a Diet selection(s) AND a Behavioral Tray selection for Safe Tray - with utensils, or Safe Tray - NO utensils            Diagnoses:      1.  Schizoaffective disorder, bipolar type. (F25.0)  2.  Provisional diagnosis of stimulant use disorder, cocaine type.  (F14.10)  3.  Likely cannabis use disorder, moderate to severe. (F12.20)  4.  Cigarette nicotine dependence, currently withdrawal. (F17.213)         Assessment & Plan:   Assessment and hospital summary:  28-year-old woman admitted after she called 911 due to belief she was being followed. This is a common theme for her. Initially she was quite paranoid here. Since arrival she has mostly kept to  herself, but has had some aggressive behaviors related to her paranoia. In addition, she snuck a phone onto the unit, which has not been put in her locker. She initially agreed to a trial of clozapine, but refused blood draws needed to start this, so olanzapine was continued instead. This was changed to haloperidol on 12/12 due to lack of efficacy. The haloperidol was initiated as an emergency medication due to aggressive and self-injurious behaviors as haloperidol was not on her Aranda order.    Target psychiatric symptoms and interventions:  Psychosis  -discontinued Zyprexa due to lack of efficacy on 12/13  -Started haloperidol 5 mg BID with IM backup as an Emergency Medication on 12/13. The patient is at serious risk for harm to self or other due to her psychosis and haloperidol is not currently on her Aranda order. She has done well with this change.    Medical Problems and Treatments:  None acute    Behavioral/Psychological/Social:  Encourage unit programming    Discontinue 1:1 due to ongoing lack of sign of acute dangerousness to self and others.    Disposition Plan   Reason for ongoing admission: poses an imminent risk to self, poses an imminent risk to others and is unable to care for self due to severe psychosis or jacqueline  Discharge location: [facility] facility  Discharge Medications: not ordered  Follow-up Appointments: not scheduled  Legal Status: full commitment with Aranda for clozapine, olanzapine, aripiprazole, lurasidone. Patient has hearing to amend Aranda on 1/02/19 (started by outpatient provider) I have redone the note to request haloperidol, risperidone, chlorpromazine, and clozapine.  Entered by: Cy Sumner on 12/26/2018 at 3:33 PM

## 2018-12-27 PROCEDURE — 25000132 ZZH RX MED GY IP 250 OP 250 PS 637: Performed by: PHYSICIAN ASSISTANT

## 2018-12-27 PROCEDURE — H2032 ACTIVITY THERAPY, PER 15 MIN: HCPCS

## 2018-12-27 PROCEDURE — G0177 OPPS/PHP; TRAIN & EDUC SERV: HCPCS

## 2018-12-27 PROCEDURE — 25000132 ZZH RX MED GY IP 250 OP 250 PS 637: Performed by: PSYCHIATRY & NEUROLOGY

## 2018-12-27 PROCEDURE — 12400003 ZZH R&B MH CRITICAL UMMC

## 2018-12-27 RX ADMIN — DIPHENHYDRAMINE HYDROCHLORIDE 50 MG: 50 CAPSULE ORAL at 19:20

## 2018-12-27 RX ADMIN — METRONIDAZOLE 500 MG: 500 TABLET ORAL at 19:20

## 2018-12-27 RX ADMIN — METRONIDAZOLE 500 MG: 500 TABLET ORAL at 09:08

## 2018-12-27 RX ADMIN — HALOPERIDOL 5 MG: 5 TABLET ORAL at 09:08

## 2018-12-27 RX ADMIN — HALOPERIDOL 5 MG: 5 TABLET ORAL at 19:20

## 2018-12-27 ASSESSMENT — ACTIVITIES OF DAILY LIVING (ADL)
ORAL_HYGIENE: INDEPENDENT
LAUNDRY: UNABLE TO COMPLETE
HYGIENE/GROOMING: INDEPENDENT
HYGIENE/GROOMING: INDEPENDENT
DRESS: SCRUBS (BEHAVIORAL HEALTH);INDEPENDENT
ORAL_HYGIENE: INDEPENDENT
DRESS: SCRUBS (BEHAVIORAL HEALTH)
LAUNDRY: UNABLE TO COMPLETE

## 2018-12-27 NOTE — PLAN OF CARE
BEHAVIORAL TEAM DISCUSSION  (for 12-26-18)  Participants: dr hull, ninoska white rn, opal de jesus, michael de jesus in training.  Progress: Pt is making good progress.   Her mood is stable and her psychotic symptoms have improved.    She was taken off 1:1 status yesterday.   She has had numerous days of no agitation, no aggression, no swallowing of foreign bodies, no psychotic/paranoid delusional statements.    She asks appropriate questions and is involved with her aftercare planning.   Attends afternoon groups.   She is med compliant.  Continued Stay Criteria/Rationale:  Pt is probably almost ready to be considered for transfer to a less restrictive unit.  Medical/Physical:per internal medicine  Precautions:   Behavioral Orders   Procedures    Assault precautions    Code 1 - Restrict to Unit    Routine Programming     As clinically indicated    Status 15     Every 15 minutes.    Suicide precautions     Patients on Suicide Precautions should have a Combination Diet ordered that includes a Diet selection(s) AND a Behavioral Tray selection for Safe Tray - with utensils, or Safe Tray - NO utensils       Plan:  meds per dr hull.  She has a hearing on 1-2-19 at 3pm.   It's regarding amending the Aranda and extending her commitment.   I made recent referrals to 4 IRT's.   We are hoping she can do some IRT's interviews next week.   Pt will need local remote PD done with [facility], at time of discharge.  Rationale for change in precautions or plan: no change at this time.

## 2018-12-27 NOTE — PROGRESS NOTES
11/28/18 0349   Patient Belongings   Did you bring any home meds/supplements to the hospital?  Yes   Disposition of meds  Other (see comment)   Patient Belongings keys;money (see comment);purse;shoes;wallet;other (see comments)   Disposition of Belongings Other (see comment)   Belongings Search Yes   Clothing Search Yes   Second Staff RN   General Info Comment Pt had no cell phone and only had $0.96 in cash     -TWO BOTTLES OF MEDICATIONS GIVEN TO RN      -ITEMS IN PATIENT STORAGE LOCKER #3::    1 sweater- placed in pt locker  2 scarves- placed in pt locker  1 dress- placed in pt locker  1 pair of pants- placed in pt locker  2 skirts- placed in pt locker  1 jacket- placed in pt locker  1 box cigarettes- placed in pt locker  1 glass container- placed in pt locker  1 lighter- placed in pt locker  1 pair of shoes- placed in pt locker  1 shirt- placed in pt locker  1 toothbrush- placed in pt locker  1 pink phone case wallet (no actual phone in pt belongings)- placed in pt locker  1 key on a blue lanyard- placed in pt locker  1 pair underwear- placed in pt locker  1 phone   1 toothbrush  1 [place] Identification Card- placed in pt locker  $0.96 in coins   Miscellaneous paperwork- placed in pt locker      -ITEMS SENT IN SECURITY ENVELOPE #[id number]:    1 [place] EBT (5702)  1 Visa Debit Card (6540)    12/26/18  Pt sent EBT card home with her mother  Remaining card sent to security in envelope # [id number]  Visa debit card (6525)    1/8/2019  Pts mother brought in following items for pt to have pending discharge  4 skirts (3 green, 1 floral print)  1 green head scarf  1 yellow dress  1 cellphone w/ charging cord  $33 cash kept in nursing station in envelope #[id number]    A               Admission:  I am responsible for any personal items that are not sent to the safe or pharmacy.  [facility] is not responsible for loss, theft or damage of any property in my possession.    Signature:  _________________________________  Date: _______  Time: _____                                              Staff Signature:  ____________________________ Date: ________  Time: _____      2nd Staff person, if patient is unable/unwilling to sign:    Signature: ________________________________ Date: ________  Time: _____     Discharge:  [facility] has returned all of my personal belongings:    Signature: _________________________________ Date: ________  Time: _____                                          Staff Signature:  ____________________________ Date: ________  Time: _____

## 2018-12-27 NOTE — PLAN OF CARE
Pt SIO discontinued this afternoon and [place] suicidal risk assessment completed. Pt denies any suicidal thought or plan. She denies any thoughts to swallow foreign object. Bright affect this shift; she [facility] staff and peers. Pt is on Antibiotic for virginal infection and denies any side effect of the antibiotic. She remains compliant all her scheduled medication. Pt's mother visits this evening and took Ilene's EBT card home to help pay her bills. Doctor Sumner gave the order for mom to take the card home.

## 2018-12-27 NOTE — PLAN OF CARE
OT General Care Plan  OT Goal 1  Description  Work with pt to increase awareness of how symptoms can impact performance on goal-directed tasks and life management skills. Will provide opportunities to build on coping strategies to manage symptoms during hospitalization and after d/c.     Pt again declined morning groups, though participated in the afternoon OT clinic.  Took interest in making a hemp bracelet, and picked up the 3 step knotting technique immediately.  Pleasant mood, looking forward to eventual discharge.    12/27/2018 1525 - Improving by Love Pak, OT

## 2018-12-27 NOTE — PROGRESS NOTES
Patient was in room for the better half of shift but then came out for music therapy. She seemed to enjoy group and did add input towards topics. Overall her mood was pleasant and no behavioral issues other than telling another patient that he most likely would not leave for 6 months.

## 2018-12-28 PROCEDURE — 25000132 ZZH RX MED GY IP 250 OP 250 PS 637: Performed by: PSYCHIATRY & NEUROLOGY

## 2018-12-28 PROCEDURE — H2032 ACTIVITY THERAPY, PER 15 MIN: HCPCS

## 2018-12-28 PROCEDURE — 99207 ZZC CDG-MDM COMPONENT: MEETS MODERATE - UP CODED: CPT | Performed by: PSYCHIATRY & NEUROLOGY

## 2018-12-28 PROCEDURE — 25000132 ZZH RX MED GY IP 250 OP 250 PS 637: Performed by: PHYSICIAN ASSISTANT

## 2018-12-28 PROCEDURE — 12400003 ZZH R&B MH CRITICAL UMMC

## 2018-12-28 PROCEDURE — 99232 SBSQ HOSP IP/OBS MODERATE 35: CPT | Performed by: PSYCHIATRY & NEUROLOGY

## 2018-12-28 RX ADMIN — METRONIDAZOLE 500 MG: 500 TABLET ORAL at 20:38

## 2018-12-28 RX ADMIN — HALOPERIDOL 5 MG: 5 TABLET ORAL at 11:11

## 2018-12-28 RX ADMIN — METRONIDAZOLE 500 MG: 500 TABLET ORAL at 11:11

## 2018-12-28 RX ADMIN — HALOPERIDOL 5 MG: 5 TABLET ORAL at 20:38

## 2018-12-28 ASSESSMENT — ACTIVITIES OF DAILY LIVING (ADL)
HYGIENE/GROOMING: INDEPENDENT
ORAL_HYGIENE: INDEPENDENT
ORAL_HYGIENE: INDEPENDENT
DRESS: INDEPENDENT
DRESS: SCRUBS (BEHAVIORAL HEALTH)
HYGIENE/GROOMING: INDEPENDENT
LAUNDRY: UNABLE TO COMPLETE

## 2018-12-28 NOTE — PROGRESS NOTES
12/27/18 1330   Dance Movement Therapy   Type of Intervention structured groups   Response participates with encouragement   Hours 1   Treatment Detail pleasant, socially-interactive with support

## 2018-12-28 NOTE — PROGRESS NOTES
Pt out in the milieu making phone calls, pt's calm/pleasant/controlled behavior, pt denies of psych symptoms, pt observed none outburst to self/others, pt stated that the day was good, pt needs are offered/known, pt went to bed early/remained in room, pt was encouraged for ADL shower.     12/27/18 Rodo   Behavioral Health   Thoughts/Cognition (WDL) ex   Hallucinations denies / not responding to hallucinations   Thinking poor concentration   Orientation person: oriented;place: oriented   Insight poor   Judgement impaired   Eye Contact at examiner   Affect/Mood (WDL) WDL   Affect blunted, flat   Mood mood is calm   ADL Assessment (WDL) WDL   Physical Appearance/Attire attire appropriate to age and situation   Hygiene well groomed   Suicidality (WDL) WDL   1. Wish to be Dead No   2. Non-Specific Active Suicidal Thoughts  No   Self Injury other (see comment)  (none)   Elopement (WDL) WDL   Activity (WDL) Ex   Activity isolative   Speech (WDL) WDL   Speech clear;coherent   Medication Sensitivity (WDL) WDL   Medication Sensitivity no stated side effects;no observed side effects   Psychomotor Gait (WDL) WDL   Psychomotor / Gait balanced;steady   Overt Agression (WDL) WDL   Activities of Daily Living   Hygiene/Grooming independent   Oral Hygiene independent   Dress scrubs (behavioral health);independent   Laundry unable to complete   Room Organization independent   Activity   Activity Assistance Provided independent

## 2018-12-28 NOTE — PROGRESS NOTES
12/28/18 6480   Dance Movement Therapy   Type of Intervention structured groups   Response participates with encouragement   Hours 0.5   Treatment Detail Pt joined with prompting but appeared uncomfortable.  Followed directed movement but did not initiate.  Pt left session without explanation.

## 2018-12-28 NOTE — PROGRESS NOTES
[facility], [place]   Psychiatric Progress Note  Hospital Day: 30        Interim History:   The patient's care was discussed with the treatment team during the daily team meeting and/or staff's chart notes were reviewed.  Patient has done well since stopping 1:1 staff. No behavioral or safety issues noted. [facility] reports that Gato Valdez reviewed chart and declined the patient due to past behaviors.    Upon interview, the patient was calm and cooperative. She was out in the lounge speaking with a staff member. She is glad that her chart is being reviewed for IRTS placement. I met with her before we learned of decline by Gato Valdez. Patient reportedly took the news well.    Psychiatric Symptoms: paranoia improving    Medication side effects reported: None    Other issues reported by patient: None         Medications:       haloperidol  5 mg Oral BID    Or     haloperidol lactate  5 mg Intramuscular BID     metroNIDAZOLE  500 mg Oral BID          Allergies:   No Known Allergies       Labs:     No results found for this or any previous visit (from the past 48 hour(s)).       Psychiatric Examination:     /70   Pulse 94   Temp 97.8  F (36.6  C)   Resp 16   Wt 92.3 kg (203 lb 8 oz)   LMP 10/31/2018 (Approximate)   SpO2 100%   BMI 32.85 kg/m    Weight is 203 lbs 8 oz  Body mass index is 32.85 kg/m .      Orthostatic Vitals       Most Recent      Sitting Orthostatic /66 12/27 1621    Sitting Orthostatic Pulse (bpm) 101 12/27 1621    Standing Orthostatic /72 12/27 1621    Standing Orthostatic Pulse (bpm) 92 12/27 1621            Appearance: awake, alert, adequately groomed and dressed in hospital scrubs  Attitude:  cooperative  Eye Contact:  good  Mood:  good  Affect:  intensity is normal  Speech:  clear, coherent and normal prosody  Language: fluent and intact in English  Psychomotor, Gait, Musculoskeletal:  no evidence of tardive dyskinesia, dystonia, or tics  Throught  Process:  goal oriented  Associations:  no loose associations  Thought Content:  no evidence of suicidal ideation or homicidal ideation and denies hallucinations  Insight:  limited  Judgement:  fair  Oriented to:  time, person, and place  Attention Span and Concentration:  intact  Recent and Remote Memory:  intact  Fund of Knowledge:  adequate    Clinical Global Impressions  First:  Considering your total clinical experience with this particular patient population, how severe are the patient's symptoms at this time?: 7 (11/28/18 1001)  Compared to the patient's condition at the START of treatment, this patient's condition is:: 4 (11/28/18 1001)  Most recent:  Considering your total clinical experience with this particular patient population, how severe are the patient's symptoms at this time?: 7 (12/26/18 1532)  Compared to the patient's condition at the START of treatment, this patient's condition is:: 3 (12/26/18 1532)             Precautions:     Behavioral Orders   Procedures     Assault precautions     Code 1 - Restrict to Unit     Routine Programming     As clinically indicated     Status 15     Every 15 minutes.     Suicide precautions     Patients on Suicide Precautions should have a Combination Diet ordered that includes a Diet selection(s) AND a Behavioral Tray selection for Safe Tray - with utensils, or Safe Tray - NO utensils            Diagnoses:      1.  Schizoaffective disorder, bipolar type. (F25.0)  2.  Provisional diagnosis of stimulant use disorder, cocaine type.  (F14.10)  3.  Likely cannabis use disorder, moderate to severe. (F12.20)  4.  Cigarette nicotine dependence, currently withdrawal. (F17.213)         Assessment & Plan:   Assessment and hospital summary:  28-year-old woman admitted after she called 911 due to belief she was being followed. This is a common theme for her. Initially she was quite paranoid here. Since arrival she has mostly kept to herself, but has had some aggressive  behaviors related to her paranoia. In addition, she snuck a phone onto the unit, which has not been put in her locker. She initially agreed to a trial of clozapine, but refused blood draws needed to start this, so olanzapine was continued instead. This was changed to haloperidol on 12/12 due to lack of efficacy. The haloperidol was initiated as an emergency medication due to aggressive and self-injurious behaviors as haloperidol was not on her Aranda order. She has been doing well with this change.    Target psychiatric symptoms and interventions:  Psychosis  -discontinued Zyprexa due to lack of efficacy on 12/13  -haloperidol 5 mg BID    The patient was at serious risk for harm to self or other due to her psychosis and haloperidol is not currently on her Aranda order. She has done well with this change. Order continues on emergency basis as discontinuation would put her at risk of decompensation and subsequent harm to self or others. On medications and currently, that risk is significantly less. Initial hearing to amend Aranda was scheduled on 12/17. This was delayed. I believe that it is best for patient care to continue a medication that is not on the Aranda as opposed to stopping this medication due to delay in court.    Medical Problems and Treatments:  None acute    Behavioral/Psychological/Social:  Encourage unit programming      Disposition Plan   Reason for ongoing admission: poses an imminent risk to self, poses an imminent risk to others and is unable to care for self due to severe psychosis or jacqueline  Discharge location: [facility] facility  Discharge Medications: not ordered  Follow-up Appointments: not scheduled  Legal Status: full commitment with Aranda for clozapine, olanzapine, aripiprazole, lurasidone. Patient has hearing to amend Aranda on 1/02/19 (started by outpatient provider) I have redone the note to request haloperidol, risperidone, chlorpromazine, and clozapine.  Entered by: Cy Sumner on  12/28/2018 at 3:43 PM

## 2018-12-28 NOTE — PROGRESS NOTES
Patient isolated to her room most of shift.  She ate meals, and was polite in her requests and interactions with staff and peers. No incidences.

## 2018-12-28 NOTE — PROGRESS NOTES
Ilene had a pleasant evening. She was visible in milieu and social with staff and peers. No agitating or paranoia type behaviors. No swallowing of foreign bodies and no psychotic or delusional statements this evening. She was taken off SIO yesterday, and she continues to demonstrate good behaviors on the unit. She is fully alert and oriented with some insight into her current mental status. She remains focused on discharge at this time. She is compliant with her scheduled medication and denied SE s.

## 2018-12-28 NOTE — PROGRESS NOTES
ROMY and ABHIJIT Reina   [phone]/  Fax  [phone] [place]  Fax [phone] Abhijit  Mental illness only 12/28 Called to follow up on referral - lm vm for Lily Zheng,    [facility]  [place], Clinical Director  [phone]/Fax: [phone]    Dual licensed 12/28 Called to follow up on referral- lm vm for Sylvester   Gato Horvath  [phone]/fax [phone]  Dual licensed 12/28 Called to follow up on referral-gave Yuliet brief overview of admission and current presentation.  She will review and get back to us this afternoon.      Yuliet says patient WILL NOT be accepted.  In the past she required more observation than they can provide and recommend another placement.  Patient was informed of this and while disappointed, took it well.      This is patient s preference   RE-ENTRY CHARLY Archibald Admissions coordinator  [phone]/Fax [phone]  Dual licensed 12/28 Called to follow up on referral- lm vm for admissions coordinator - she is out until Wed 1/2

## 2018-12-29 PROCEDURE — 25000132 ZZH RX MED GY IP 250 OP 250 PS 637: Performed by: PSYCHIATRY & NEUROLOGY

## 2018-12-29 PROCEDURE — 25000132 ZZH RX MED GY IP 250 OP 250 PS 637: Performed by: PHYSICIAN ASSISTANT

## 2018-12-29 PROCEDURE — 25000132 ZZH RX MED GY IP 250 OP 250 PS 637: Performed by: NURSE PRACTITIONER

## 2018-12-29 PROCEDURE — 12400003 ZZH R&B MH CRITICAL UMMC

## 2018-12-29 RX ADMIN — HALOPERIDOL 5 MG: 5 TABLET ORAL at 09:42

## 2018-12-29 RX ADMIN — HYDROXYZINE HYDROCHLORIDE 25 MG: 25 TABLET, FILM COATED ORAL at 16:08

## 2018-12-29 RX ADMIN — NICOTINE POLACRILEX 4 MG: 2 GUM, CHEWING BUCCAL at 17:00

## 2018-12-29 RX ADMIN — HALOPERIDOL 5 MG: 5 TABLET ORAL at 16:08

## 2018-12-29 RX ADMIN — METRONIDAZOLE 500 MG: 500 TABLET ORAL at 09:42

## 2018-12-29 RX ADMIN — HALOPERIDOL 5 MG: 5 TABLET ORAL at 19:02

## 2018-12-29 ASSESSMENT — ACTIVITIES OF DAILY LIVING (ADL)
DRESS: SCRUBS (BEHAVIORAL HEALTH)
HYGIENE/GROOMING: INDEPENDENT
ORAL_HYGIENE: INDEPENDENT

## 2018-12-29 NOTE — PROGRESS NOTES
"Pt isolative to room.  Affect flat but brightens on approach, pleasant and polite.  Eating and sleeping \"good\" and medication compliant.  No psychotic symptoms observed, no behavioral concerns.     12/28/18 2200   Behavioral Health   Hallucinations denies / not responding to hallucinations   Thinking intact   Orientation person: oriented;place: oriented;date: oriented;time: oriented   Memory baseline memory   Insight admits / accepts   Judgement intact   Eye Contact at examiner   Affect full range affect   Mood mood is calm   Physical Appearance/Attire attire appropriate to age and situation   Hygiene well groomed   1. Wish to be Dead No   2. Non-Specific Active Suicidal Thoughts  No   Self Injury other (see comment)  (pt denies)   Activity isolative   Speech clear;coherent   Activities of Daily Living   Hygiene/Grooming independent   Oral Hygiene independent   Dress independent   Room Organization independent     "

## 2018-12-30 PROCEDURE — 12400003 ZZH R&B MH CRITICAL UMMC

## 2018-12-30 PROCEDURE — 25000132 ZZH RX MED GY IP 250 OP 250 PS 637: Performed by: NURSE PRACTITIONER

## 2018-12-30 PROCEDURE — 25000132 ZZH RX MED GY IP 250 OP 250 PS 637: Performed by: PSYCHIATRY & NEUROLOGY

## 2018-12-30 RX ADMIN — HALOPERIDOL 5 MG: 5 TABLET ORAL at 08:41

## 2018-12-30 RX ADMIN — TRAZODONE HYDROCHLORIDE 50 MG: 50 TABLET ORAL at 19:15

## 2018-12-30 RX ADMIN — HALOPERIDOL 5 MG: 5 TABLET ORAL at 19:15

## 2018-12-30 RX ADMIN — NICOTINE POLACRILEX 4 MG: 2 GUM, CHEWING BUCCAL at 17:28

## 2018-12-30 ASSESSMENT — ACTIVITIES OF DAILY LIVING (ADL)
HYGIENE/GROOMING: INDEPENDENT
ORAL_HYGIENE: INDEPENDENT
LAUNDRY: UNABLE TO COMPLETE
DRESS: SCRUBS (BEHAVIORAL HEALTH)
LAUNDRY: UNABLE TO COMPLETE
ORAL_HYGIENE: INDEPENDENT
HYGIENE/GROOMING: INDEPENDENT

## 2018-12-30 NOTE — PLAN OF CARE
Pt presenting as calm and cooperative.  She remains mostly isolated to her room, but was comfortable make her needs know.  She approached RN writer early in the shift reporting racing thoughts, and restlessness.  Given PRN haldol and hydroxyzine. Patient anxious about discharge, hoping to leave the hospital sooner than later, but receptive to feedback about waiting for her court date on the 2nd.  She was medication compliant and denies any discomfort.

## 2018-12-30 NOTE — PROGRESS NOTES
Pt was withdrawn and isolative in her room during the majority of the shift only coming out for needs. Pt denied SI/SIB/HI, hallucinations, paranoia, anxiety and depression. Pt ate meal and had no behavioral issue this evening. Nothing else to report.       12/30/18 1444   Behavioral Health   Hallucinations denies / not responding to hallucinations   Thinking poor concentration   Orientation place: oriented;date: oriented;time: oriented;person: oriented;situation, disoriented   Memory baseline memory   Insight poor   Judgement impaired   Eye Contact at examiner   Affect full range affect   Mood depressed;mood is calm   Physical Appearance/Attire disheveled   Hygiene neglected grooming - unclean body, hair, teeth   Suicidality other (see comments)  (Denies)   1. Wish to be Dead No   2. Non-Specific Active Suicidal Thoughts  No   Self Injury other (see comment)  (denies)   Speech clear;coherent   Medication Sensitivity no stated side effects;no observed side effects   Psychomotor / Gait balanced;steady   Activities of Daily Living   Hygiene/Grooming independent   Oral Hygiene independent   Dress scrubs (behavioral health)   Laundry unable to complete   Room Organization independent

## 2018-12-31 PROCEDURE — 25000132 ZZH RX MED GY IP 250 OP 250 PS 637: Performed by: PSYCHIATRY & NEUROLOGY

## 2018-12-31 PROCEDURE — 99233 SBSQ HOSP IP/OBS HIGH 50: CPT | Performed by: PSYCHIATRY & NEUROLOGY

## 2018-12-31 PROCEDURE — 12400003 ZZH R&B MH CRITICAL UMMC

## 2018-12-31 RX ORDER — BENZTROPINE MESYLATE 0.5 MG/1
0.5 TABLET ORAL 2 TIMES DAILY
Status: DISCONTINUED | OUTPATIENT
Start: 2018-12-31 | End: 2019-01-09 | Stop reason: HOSPADM

## 2018-12-31 RX ADMIN — HALOPERIDOL 5 MG: 5 TABLET ORAL at 09:10

## 2018-12-31 RX ADMIN — BENZTROPINE MESYLATE 0.5 MG: 0.5 TABLET ORAL at 19:16

## 2018-12-31 RX ADMIN — HALOPERIDOL 5 MG: 5 TABLET ORAL at 19:16

## 2018-12-31 RX ADMIN — BENZTROPINE MESYLATE 0.5 MG: 0.5 TABLET ORAL at 13:26

## 2018-12-31 ASSESSMENT — ACTIVITIES OF DAILY LIVING (ADL)
LAUNDRY: UNABLE TO COMPLETE
LAUNDRY: UNABLE TO COMPLETE
ORAL_HYGIENE: INDEPENDENT;PROMPTS
HYGIENE/GROOMING: INDEPENDENT;PROMPTS
ORAL_HYGIENE: INDEPENDENT
HYGIENE/GROOMING: INDEPENDENT
DRESS: SCRUBS (BEHAVIORAL HEALTH)
DRESS: INDEPENDENT

## 2018-12-31 NOTE — PROGRESS NOTES
Re: IRT referral follow up    Writer left VM for Lily Zheng  at [facility] and [facility] regarding referral.  Writer attempted to contact clinical director Sylvester at [facility] and was told to call back Wednesday as he is out of the office. Previous CTC left note indicating she had left a message for [facility] and that admissions coordinator is out until 1/2/19. Per note, patient is not able to return to [facility].     Writer met with patient and let her know messages had been left. Explained that we may not hear anything until later this week due to the holiday. Writer told patient I would let her know if I heard anything today.Patient appeared frustrated but accepted information well.      Mai Jara, LPCC, LADC

## 2018-12-31 NOTE — PROGRESS NOTES
"Pt was withdrawn and isolative in her room during the entire shift. Pt denied SI/SIB/HI, paranoia, hallucinations, anxiety and depression. Pt didn't meals and stated that \"I am on strike... I will not eat until I am let go of this place\" Pt added that \" I won't take a shower\" when staff asked about shower and hygiene. Nothing else to report.       12/31/18 1505   Behavioral Health   Hallucinations denies / not responding to hallucinations   Thinking poor concentration   Orientation person: oriented;place: oriented;date: oriented;time: oriented;situation, disoriented   Memory baseline memory   Insight poor   Judgement impaired   Eye Contact at examiner   Affect full range affect   Mood mood is calm   Physical Appearance/Attire attire appropriate to age and situation   Hygiene neglected grooming - unclean body, hair, teeth   Suicidality other (see comments)  (denies)   1. Wish to be Dead No   2. Non-Specific Active Suicidal Thoughts  No   Self Injury other (see comment)  (denies)   Elopement Statements about wanting to leave   Activity isolative;withdrawn   Speech clear;coherent   Medication Sensitivity no stated side effects;no observed side effects   Psychomotor / Gait balanced;steady   Activities of Daily Living   Hygiene/Grooming independent;prompts   Oral Hygiene independent;prompts   Dress scrubs (behavioral health)   Laundry unable to complete   Room Organization independent   Activity   Activity Assistance Provided independent   Behavioral Health Interventions   Social and Therapeutic Interventions (Psychotic Symptoms) encourage socialization with peers;encourage effective boundaries with peers;encourage participation in therapeutic groups and milieu activities     "

## 2018-12-31 NOTE — PROGRESS NOTES
[facility], [place]   Psychiatric Progress Note  Hospital Day: 33        Interim History:   The patient's care was discussed with the treatment team during the daily team meeting and/or staff's chart notes were reviewed.  Patient continues to improve. Focused on discharge.    Upon interview, the patient reports that she wants to leave. I update her about the IRTS placement. She becomes angry. Later in the day she is screaming in her room. I came to speak with her and explained the process. She is frustrated and again wants to go to family. I remind her that neither I nor her outpatient team feel this appropriate. She appears to calm. Later in the day she again is yelling in her room.    Psychiatric Symptoms: paranoia.    Medication side effects reported: reports shakes shortly after taking oral haloperidol    Other issues reported by patient: None         Medications:       benztropine  0.5 mg Oral BID     haloperidol  5 mg Oral BID    Or     haloperidol lactate  5 mg Intramuscular BID          Allergies:   No Known Allergies       Labs:     No results found for this or any previous visit (from the past 48 hour(s)).       Psychiatric Examination:     /67   Pulse 90   Temp 97.2  F (36.2  C) (Tympanic)   Resp 16   Wt 92.3 kg (203 lb 8 oz)   LMP 10/31/2018 (Approximate)   SpO2 100%   BMI 32.85 kg/m    Weight is 203 lbs 8 oz  Body mass index is 32.85 kg/m .      Orthostatic Vitals       Most Recent      Sitting Orthostatic /67 12/30 1600    Sitting Orthostatic Pulse (bpm) 90 12/30 1600    Standing Orthostatic /69 12/30 1600    Standing Orthostatic Pulse (bpm) 87 12/30 1600              Appearance: awake, alert, adequately groomed and dressed in hospital scrubs  Attitude:  cooperative  Eye Contact:  intense  Mood:  irritable  Affect:  reactive  Speech:  clear, coherent and normal prosody  Language: fluent and intact in English  Psychomotor, Gait, Musculoskeletal:  no  evidence of tardive dyskinesia, dystonia, or tics  Throught Process:  goal oriented  Associations:  no loose associations  Thought Content:  no evidence of suicidal ideation or homicidal ideation and denies hallucinations  Insight:  limited  Judgement:  fair  Oriented to:  time, person, and place  Attention Span and Concentration:  intact  Recent and Remote Memory:  intact  Fund of Knowledge:  adequate    Clinical Global Impressions  First:  Considering your total clinical experience with this particular patient population, how severe are the patient's symptoms at this time?: 7 (11/28/18 1001)  Compared to the patient's condition at the START of treatment, this patient's condition is:: 4 (11/28/18 1001)  Most recent:  Considering your total clinical experience with this particular patient population, how severe are the patient's symptoms at this time?: 7 (12/26/18 1532)  Compared to the patient's condition at the START of treatment, this patient's condition is:: 3 (12/26/18 1532)             Precautions:     Behavioral Orders   Procedures     Assault precautions     Code 1 - Restrict to Unit     Routine Programming     As clinically indicated     Status 15     Every 15 minutes.     Suicide precautions     Patients on Suicide Precautions should have a Combination Diet ordered that includes a Diet selection(s) AND a Behavioral Tray selection for Safe Tray - with utensils, or Safe Tray - NO utensils            Diagnoses:      1.  Schizoaffective disorder, bipolar type. (F25.0)  2.  Provisional diagnosis of stimulant use disorder, cocaine type.  (F14.10)  3.  Likely cannabis use disorder, moderate to severe. (F12.20)  4.  Cigarette nicotine dependence, currently withdrawal. (F17.213)         Assessment & Plan:   Assessment and hospital summary:  28-year-old woman admitted after she called 911 due to belief she was being followed. This is a common theme for her. Initially she was quite paranoid here. Since arrival she  has mostly kept to herself, but has had some aggressive behaviors related to her paranoia. In addition, she snuck a phone onto the unit, which has not been put in her locker. She initially agreed to a trial of clozapine, but refused blood draws needed to start this, so olanzapine was continued instead. This was changed to haloperidol due to lack of efficacy. The haloperidol was initiated as an emergency medication due to aggressive and self-injurious behaviors as haloperidol was not on her Aranda order. She has been doing well with this change.    Target psychiatric symptoms and interventions:  Psychosis  -discontinued Zyprexa due to lack of efficacy  -continue haloperidol 5 mg BID.  -Start cogentin 0.5 mg BID due to complaints of shakes    The patient was at serious risk for harm to self or other due to her psychosis and haloperidol is not currently on her Aranda order. She has done well with this change. Order continues on emergency basis as discontinuation would put her at risk of decompensation and subsequent harm to self or others. On medications and currently, that risk is significantly less. Initial hearing to amend Aranda was scheduled on 12/17. This was delayed. I believe that it is best for patient care to continue a medication that is not on the Aranda as opposed to stopping this medication due to delay in court.    Medical Problems and Treatments:  None acute    Behavioral/Psychological/Social:  Encourage unit programming      Disposition Plan   Reason for ongoing admission: poses an imminent risk to self, poses an imminent risk to others and is unable to care for self due to severe psychosis or jacqueline  Discharge location: [facility] facility  Discharge Medications: not ordered  Follow-up Appointments: not scheduled  Legal Status: full commitment with Aranda for clozapine, olanzapine, aripiprazole, lurasidone. Patient has hearing to amend Aranda on 1/02/19 (started by outpatient provider) I have redone the note  to request haloperidol, risperidone, chlorpromazine, and clozapine.  Entered by: Cy Sumner on 12/28/2018 at 3:41 PM

## 2019-01-01 PROCEDURE — 12400001 ZZH R&B MH UMMC

## 2019-01-01 PROCEDURE — 25000132 ZZH RX MED GY IP 250 OP 250 PS 637: Performed by: PSYCHIATRY & NEUROLOGY

## 2019-01-01 PROCEDURE — 25000132 ZZH RX MED GY IP 250 OP 250 PS 637: Performed by: NURSE PRACTITIONER

## 2019-01-01 RX ADMIN — DIPHENHYDRAMINE HYDROCHLORIDE 50 MG: 50 CAPSULE ORAL at 23:47

## 2019-01-01 RX ADMIN — NICOTINE POLACRILEX 4 MG: 2 GUM, CHEWING BUCCAL at 19:06

## 2019-01-01 RX ADMIN — HALOPERIDOL 5 MG: 5 TABLET ORAL at 08:42

## 2019-01-01 RX ADMIN — NICOTINE POLACRILEX 4 MG: 2 GUM, CHEWING BUCCAL at 16:14

## 2019-01-01 RX ADMIN — BENZTROPINE MESYLATE 0.5 MG: 0.5 TABLET ORAL at 19:06

## 2019-01-01 RX ADMIN — BENZTROPINE MESYLATE 0.5 MG: 0.5 TABLET ORAL at 08:42

## 2019-01-01 RX ADMIN — HALOPERIDOL 5 MG: 5 TABLET ORAL at 19:06

## 2019-01-01 ASSESSMENT — ACTIVITIES OF DAILY LIVING (ADL)
LAUNDRY: UNABLE TO COMPLETE
ORAL_HYGIENE: INDEPENDENT
ORAL_HYGIENE: INDEPENDENT
LAUNDRY: UNABLE TO COMPLETE
HYGIENE/GROOMING: INDEPENDENT
DRESS: SCRUBS (BEHAVIORAL HEALTH);INDEPENDENT
DRESS: SCRUBS (BEHAVIORAL HEALTH)
HYGIENE/GROOMING: INDEPENDENT

## 2019-01-01 NOTE — PLAN OF CARE
Ilene had a pleasant evening. She had one episode of agitating behaviors during the day shift due to her placement issue. She was yelling and screaming in her room in protest of her extended stay in the hospital. She believed the treatment team is not doing an excellent job in finding her a placement. Pt had no understanding that [facility] declined her due to her past behaviors history at their facility. She requests to be discharged to her sister/aunt's house. Dr. Sumner talked to her regarding the placement issue, and she was able to calm herself down without the use of PRN medication. No further behavioral issue.  No SI/SIB.

## 2019-01-01 NOTE — PROGRESS NOTES
Patient was isolative to her room for most of the day. She was able to clearly verbalize her needs when necessary. Patient showered this morning. Overall, pt was calm and cooperative throughout the shift.        01/01/19 1300   Behavioral Health   Hallucinations denies / not responding to hallucinations   Orientation person: oriented;place: oriented;date: oriented;time: oriented   Memory baseline memory   Insight insight appropriate to situation   Judgement intact   Eye Contact at examiner   Affect full range affect   Mood mood is calm   Physical Appearance/Attire neat   Hygiene well groomed   Suicidality other (see comments)  (pt denies)   1. Wish to be Dead No   2. Non-Specific Active Suicidal Thoughts  No   Self Injury other (see comment)  (none reported or observed)   Activity isolative;withdrawn   Speech clear;coherent   Medication Sensitivity no stated side effects;no observed side effects   Psychomotor / Gait balanced;steady   Activities of Daily Living   Hygiene/Grooming independent   Oral Hygiene independent   Dress scrubs (behavioral health)   Laundry unable to complete   Room Organization independent   Behavioral Health Interventions   Social and Therapeutic Interventions (Psychotic Symptoms) encourage socialization with peers;encourage effective boundaries with peers;encourage participation in therapeutic groups and milieu activities

## 2019-01-02 PROCEDURE — 99232 SBSQ HOSP IP/OBS MODERATE 35: CPT | Performed by: PSYCHIATRY & NEUROLOGY

## 2019-01-02 PROCEDURE — 25000132 ZZH RX MED GY IP 250 OP 250 PS 637: Performed by: NURSE PRACTITIONER

## 2019-01-02 PROCEDURE — 25000132 ZZH RX MED GY IP 250 OP 250 PS 637: Performed by: PSYCHIATRY & NEUROLOGY

## 2019-01-02 PROCEDURE — 12400001 ZZH R&B MH UMMC

## 2019-01-02 RX ADMIN — HALOPERIDOL 5 MG: 5 TABLET ORAL at 19:23

## 2019-01-02 RX ADMIN — DIPHENHYDRAMINE HYDROCHLORIDE 50 MG: 50 CAPSULE ORAL at 19:23

## 2019-01-02 RX ADMIN — HALOPERIDOL 5 MG: 5 TABLET ORAL at 08:45

## 2019-01-02 RX ADMIN — BENZTROPINE MESYLATE 0.5 MG: 0.5 TABLET ORAL at 08:45

## 2019-01-02 RX ADMIN — BENZTROPINE MESYLATE 0.5 MG: 0.5 TABLET ORAL at 19:23

## 2019-01-02 RX ADMIN — NICOTINE POLACRILEX 4 MG: 2 GUM, CHEWING BUCCAL at 18:37

## 2019-01-02 ASSESSMENT — ACTIVITIES OF DAILY LIVING (ADL)
ORAL_HYGIENE: PROMPTS
HYGIENE/GROOMING: PROMPTS
DRESS: SCRUBS (BEHAVIORAL HEALTH)
LAUNDRY: UNABLE TO COMPLETE
HYGIENE/GROOMING: PROMPTS
ORAL_HYGIENE: PROMPTS
LAUNDRY: UNABLE TO COMPLETE
DRESS: INDEPENDENT

## 2019-01-02 NOTE — PLAN OF CARE
BEHAVIORAL TEAM DISCUSSION    Participants:  Vicky Sutton RN, Tyshawn S, CTC  Progress:  Continuing to assess  Continued Stay Criteria/Rationale:  Waiting IRTS Placement  Medical/Physical:  Per Internal Medicine  Precautions:   Behavioral Orders   Procedures    Assault precautions    Code 1 - Restrict to Unit    Routine Programming     As clinically indicated    Status 15     Every 15 minutes.    Suicide precautions     Patients on Suicide Precautions should have a Combination Diet ordered that includes a Diet selection(s) AND a Behavioral Tray selection for Safe Tray - with utensils, or Safe Tray - NO utensils       Plan:  Patient will have a phone interview with [facility]  tomorrow.  CTC will coordinate disposition and aftercare plan.  and aftercare plan.   Rationale for change in precautions or plan:  No change

## 2019-01-02 NOTE — PLAN OF CARE
"Patient alert and oriented to person, place, and time. Stated mood is \"alright\". Affect restricted, slightly intense although polite with this writer. She denied any current AH or VH. Stated she felt that her medication helped with her \"paranoia\" and stated she did not feel she was having any paranoia currently. Stare intensely at times. She was mostly isolative to her room. She denied SI or self harm ideation. Remained focused on discharge planning and was happy to know that she had an interview for [facility] tomorrow at 1300. She ate well for both breakfast and lunch. Went to court this afternoon with . She denied any further SEs of shaking from haldol, stating that the medication (cogentin) \"made it go away\". No aggressive behaviors this shift.   "

## 2019-01-02 NOTE — PROGRESS NOTES
Pt arrived back to unit 12 from court at about 1555. Pt was searched with writer and psychiatric associate Christy. Pt was reluctant to carry out the search, but eventually complied.

## 2019-01-02 NOTE — PROGRESS NOTES
"Pt was in her room most of the evening.  Pt denies anx, dep, SI,SIB.  Pt talked about, \"hearing I can leave soon but I don't know when or where I'm going to.\"  Pt asked staff who she should ask for more information.  Staff referred her to her doctor + .  Pt was satisfied with the answer + was preparing for bed during last check.  "

## 2019-01-02 NOTE — PROGRESS NOTES
This writer spoke with Intake (Sheila) at [facility] and patient will have a phone interview tomorrow @ 1pm.

## 2019-01-03 PROCEDURE — 12400001 ZZH R&B MH UMMC

## 2019-01-03 PROCEDURE — G0177 OPPS/PHP; TRAIN & EDUC SERV: HCPCS

## 2019-01-03 PROCEDURE — 25000132 ZZH RX MED GY IP 250 OP 250 PS 637: Performed by: NURSE PRACTITIONER

## 2019-01-03 PROCEDURE — 25000132 ZZH RX MED GY IP 250 OP 250 PS 637: Performed by: PSYCHIATRY & NEUROLOGY

## 2019-01-03 RX ADMIN — BENZTROPINE MESYLATE 0.5 MG: 0.5 TABLET ORAL at 08:58

## 2019-01-03 RX ADMIN — LORAZEPAM 2 MG: 2 TABLET ORAL at 14:51

## 2019-01-03 RX ADMIN — BENZTROPINE MESYLATE 0.5 MG: 0.5 TABLET ORAL at 19:43

## 2019-01-03 RX ADMIN — NICOTINE POLACRILEX 4 MG: 2 GUM, CHEWING BUCCAL at 15:47

## 2019-01-03 RX ADMIN — DIPHENHYDRAMINE HYDROCHLORIDE 50 MG: 50 CAPSULE ORAL at 14:51

## 2019-01-03 RX ADMIN — NICOTINE POLACRILEX 4 MG: 2 GUM, CHEWING BUCCAL at 19:47

## 2019-01-03 RX ADMIN — HALOPERIDOL 5 MG: 5 TABLET ORAL at 08:58

## 2019-01-03 RX ADMIN — HALOPERIDOL 5 MG: 5 TABLET ORAL at 14:51

## 2019-01-03 RX ADMIN — HALOPERIDOL 5 MG: 5 TABLET ORAL at 19:43

## 2019-01-03 ASSESSMENT — ACTIVITIES OF DAILY LIVING (ADL)
HYGIENE/GROOMING: INDEPENDENT
HYGIENE/GROOMING: INDEPENDENT
DRESS: SCRUBS (BEHAVIORAL HEALTH)
DRESS: INDEPENDENT
LAUNDRY: UNABLE TO COMPLETE
LAUNDRY: UNABLE TO COMPLETE
ORAL_HYGIENE: INDEPENDENT
ORAL_HYGIENE: INDEPENDENT

## 2019-01-03 NOTE — PROVIDER NOTIFICATION
01/03/19 1445   Seclusion or Restraint Order   In Person Face to Face Assessment Conducted Yes-Eval of pt's immediate situation, reaction to intervention, complete review of systems assessment, behavioral assessment & review/assessment of hx, drugs & meds, recent labs, etc, behavioral condition, need to continue/terminate restraint/seclusion   Patient Experienced No adverse physical outcome from seclusion/restraint initiation   Continuation of Seclus/Restraint indicated at this time No   Describe actions taken Pt walked to her room, took PRN's     RN writer reviewed the chart including but not limited to: review of systems; review of MAR, including PRN medications given in the last 3 days; current scheduled medications; recent vital signs; labs from the last week; past medical history.  There were no physiological abnormalities revealed that can account for the events leading up to the physical hold event and the patient's concerning behavior.  The RN assessment as documented above has been discussed with Dr. Mayfield.

## 2019-01-03 NOTE — PROGRESS NOTES
[facility], [place]   Psychiatric Progress Note  Hospital Day: 36        Interim History:   The patient's care was discussed with the treatment team during the daily team meeting and/or staff's chart notes were reviewed.  Patient has been doing better with change to haloperidol. She reported to RN today that she was feeling less paranoid and that the cogentin was helping with the shakes.    Upon interview, the patient is cooperative with good eye contact. Although still somewhat anxious, she is much less paranoid appearing. She asks about getting a head scarf. I remind her that given her history of tying things around her neck and general frustration with placement, I'd like to hold off until she has a more definite plan and better outlook. She expressed understanding of this. She was glad to learn that she will be having a phone interview on 1/3.    Psychiatric Symptoms: paranoia improving    Medication side effects reported: shakes improved with     Other issues reported by patient: None         Medications:       benztropine  0.5 mg Oral BID     haloperidol  5 mg Oral BID    Or     haloperidol lactate  5 mg Intramuscular BID          Allergies:   No Known Allergies       Labs:     No results found for this or any previous visit (from the past 48 hour(s)).       Psychiatric Examination:     /61   Pulse 90   Temp 97.2  F (36.2  C) (Tympanic)   Resp 16   Wt 92.3 kg (203 lb 8 oz)   LMP 10/31/2018 (Approximate)   SpO2 98%   BMI 32.85 kg/m    Weight is 203 lbs 8 oz  Body mass index is 32.85 kg/m .      Orthostatic Vitals       Most Recent      Sitting Orthostatic /61 01/02 1500    Sitting Orthostatic Pulse (bpm) 78 01/02 1500    Standing Orthostatic /67 01/02 1500    Standing Orthostatic Pulse (bpm) 100 01/02 1500            Appearance: awake, alert, adequately groomed and dressed in hospital scrubs  Attitude:  cooperative  Eye Contact:  good, better  Mood:   good  Affect:  appropriate and in normal range, mood congruent and reactive  Speech:  clear, coherent and normal prosody  Language: fluent and intact in English  Psychomotor, Gait, Musculoskeletal:  no evidence of tardive dyskinesia, dystonia, or tics  Throught Process:  linear and goal oriented  Associations:  no loose associations  Thought Content:  no evidence of suicidal ideation or homicidal ideation and denies hallucinations  Insight:  fair  Judgement:  intact  Oriented to:  time, person, and place  Attention Span and Concentration:  intact  Recent and Remote Memory:  intact  Fund of Knowledge:  adequate    Clinical Global Impressions  First:  Considering your total clinical experience with this particular patient population, how severe are the patient's symptoms at this time?: 7 (11/28/18 1001)  Compared to the patient's condition at the START of treatment, this patient's condition is:: 4 (11/28/18 1001)  Most recent:  Considering your total clinical experience with this particular patient population, how severe are the patient's symptoms at this time?: 7 (01/03/19 1239)  Compared to the patient's condition at the START of treatment, this patient's condition is:: 2 (01/03/19 1239)               Precautions:     Behavioral Orders   Procedures     Assault precautions     Code 1 - Restrict to Unit     Routine Programming     As clinically indicated     Status 15     Every 15 minutes.     Suicide precautions     Patients on Suicide Precautions should have a Combination Diet ordered that includes a Diet selection(s) AND a Behavioral Tray selection for Safe Tray - with utensils, or Safe Tray - NO utensils            Diagnoses:      1.  Schizoaffective disorder, bipolar type. (F25.0)  2.  Provisional diagnosis of stimulant use disorder, cocaine type.  (F14.10)  3.  Likely cannabis use disorder, moderate to severe. (F12.20)  4.  Cigarette nicotine dependence, currently withdrawal. (F17.213)         Assessment & Plan:    Assessment and hospital summary:  28-year-old woman admitted after she called 911 due to belief she was being followed. This is a common theme for her. Initially she was quite paranoid here. Since arrival she has mostly kept to herself, but has had some aggressive behaviors related to her paranoia. In addition, she snuck a phone onto the unit, which has not been put in her locker. She initially agreed to a trial of clozapine, but refused blood draws needed to start this, so olanzapine was continued instead. This was changed to haloperidol due to lack of efficacy. The haloperidol was initiated as an emergency medication due to aggressive and self-injurious behaviors as haloperidol was not on her Aranda order. She has been doing well with this change.    Target psychiatric symptoms and interventions:  Psychosis  -discontinued Zyprexa due to lack of efficacy  -continue haloperidol 5 mg BID.  -continue cogentin 0.5 mg BID due to complaints of shakes with haloperidol    The patient was at serious risk for harm to self or other due to her psychosis and haloperidol is not currently on her Aranda order. She has done well with this change. Order continues on emergency basis as discontinuation would put her at risk of decompensation and subsequent harm to self or others. On medications and currently, that risk is significantly less. Initial hearing to amend Aranda was scheduled on 12/17. This was delayed. I believe that it is best for patient care to continue a medication that is not on the Aranda as opposed to stopping this medication due to delay in court. She has now had the final court hearing. Will change orders upon return of official court order amending Aranda.    Medical Problems and Treatments:  None acute    Behavioral/Psychological/Social:  Encourage unit programming      Disposition Plan   Reason for ongoing admission: poses an imminent risk to self, poses an imminent risk to others and is unable to care for  self due to severe psychosis or jacqueline  Discharge location: IRTS facility  Discharge Medications: not ordered  Follow-up Appointments: not scheduled  Legal Status: full commitment with Aranda for clozapine, olanzapine, aripiprazole, lurasidone. Patient has hearing to amend Aranda on 1/02/19 (started by outpatient provider) I have redone the note to request haloperidol, risperidone, chlorpromazine, and clozapine.  Entered by: Cy Sumner on 1/3/2019 at 12:40 PM

## 2019-01-03 NOTE — PLAN OF CARE
"  OT General Care Plan  OT Goal 1  Description  Work with pt to increase awareness of how symptoms can impact performance on goal-directed tasks and life management skills. Will provide opportunities to build on coping strategies to manage symptoms during hospitalization and after d/c.    Pt is often in her room when group is announced though will attend when writer specifically invites her.  Affect was low when pt shared her commitment was extended, though after discussion that this only serves to help keep her accountable, and can even help her get services that may otherwise be hard to access, she agreed that it may not be all that bad.    Started a new project in OT clinic, chose to transfer an image, \"COURAGE\" to a large canvas to paint to put in her new room when she eventually discharges to an IRTS.  Proud of how well it was turning out so far.    1/3/2019 1415 - Improving by Love Pak, OT     "

## 2019-01-03 NOTE — PROGRESS NOTES
Pt was isolative to room throughout the morning, pleasant and polite.  Early this afternoon pt had a phone interview, and became quite upset after.  Pt was told that she was accepted, however it could take two to three weeks, causing pt to become very frustrated and angry.  At approximately 1445 writer was entering unit through break room doors, and pt charged writer attempting to enter.  Pt was not responsive to redirection, grabbed a hold of writers shirt and badge while continuing to force herself off the unit.  Duress beeper was initiated and a take down required.  Pt was agreeable to walk to room after, no restraint or seclusion required.  No injury to patient observed.  Will continue to monitor in room.     01/03/19 1400   Behavioral Health   Hallucinations denies / not responding to hallucinations   Thinking distractable;poor concentration   Orientation person: oriented;place: oriented;date: oriented;time: oriented   Memory baseline memory   Insight insight appropriate to situation   Judgement impaired   Eye Contact at examiner   Affect angry;sad;tense   Mood hopeless;anxious;irritable   Physical Appearance/Attire attire appropriate to age and situation   Hygiene well groomed   Elopement Loitering near exit doors;Trying handles of doors;Distress about legal situation (holds for mental health or criminal);Statements about wanting to leave   Activity isolative;withdrawn;hyperactive (agitated, impulsive)   Speech pressured;rambling   Psychomotor / Gait balanced;steady   Occupational Therapy   Type of Intervention structured groups   Response Initiates, socially acceptable   Hours 2   Activities of Daily Living   Hygiene/Grooming independent   Oral Hygiene independent   Dress independent   Laundry unable to complete   Room Organization prompts

## 2019-01-03 NOTE — PROGRESS NOTES
Patient spent a good majority of the evening shift in her room. Mood seemed good and behavior appropriate.

## 2019-01-03 NOTE — PROVIDER NOTIFICATION
"   01/03/19 1445 01/03/19 1450   Restraint Monitoring Q15 Minutes   Psychological Status O  (Pt is able to contract for safety, no need to continue hold) --    Physical Comfort Other  (Pt is being released from physical hold) --    Circulation NS --    Continuous Observation (continuous observation not needed; released from hold) --    Restraint Type   Other (Comment) --  Discontinued   Debriefing   Debriefing --  DO   Does patient understand why the event happened? --  Yes   Does patient agree to safe behaviors? --  Yes   What can we do differently so this doesn't happen again? --  Other (comment)  (Pt reports, \"I just need to get out of here!\".)   Plan of care reviewed and modified --  Yes     Elopement precautions added.  "

## 2019-01-03 NOTE — PROVIDER NOTIFICATION
01/03/19 1445   Justification   Clinical Justification Self     Patient was crouching down at the end of the hallway, near the unit exit doors.  A staff member attempted to enter the unit and Ilene attempted to push her way past unit staff in an attempt to elope from the hospital.  Immediate action was taken, using BCS interventions, and a brief physical hold was indicated.  RN writer conducted a focused assessment and the decision was made to walk patient back to her room.  Once safely in her room, Ilene accepted PRN medications (Haldol, Benadryl, and Ativan).  She was able to contract for safety and expressed remorse for her attempt at elopement.  All potential implements for self harm and harm toward others were removed from Ilene's room in an attempt to ensure safety of the patient given her recent history of aggressive behavior and SIB.  Will continue to monitor closely.

## 2019-01-04 PROCEDURE — 12400001 ZZH R&B MH UMMC

## 2019-01-04 PROCEDURE — 99232 SBSQ HOSP IP/OBS MODERATE 35: CPT | Performed by: PSYCHIATRY & NEUROLOGY

## 2019-01-04 PROCEDURE — 25000132 ZZH RX MED GY IP 250 OP 250 PS 637: Performed by: NURSE PRACTITIONER

## 2019-01-04 PROCEDURE — 25000132 ZZH RX MED GY IP 250 OP 250 PS 637: Performed by: PSYCHIATRY & NEUROLOGY

## 2019-01-04 PROCEDURE — G0177 OPPS/PHP; TRAIN & EDUC SERV: HCPCS

## 2019-01-04 RX ADMIN — HALOPERIDOL 5 MG: 5 TABLET ORAL at 19:37

## 2019-01-04 RX ADMIN — OLANZAPINE 10 MG: 10 TABLET, ORALLY DISINTEGRATING ORAL at 19:37

## 2019-01-04 RX ADMIN — NICOTINE POLACRILEX 4 MG: 2 GUM, CHEWING BUCCAL at 17:02

## 2019-01-04 RX ADMIN — NICOTINE POLACRILEX 4 MG: 2 GUM, CHEWING BUCCAL at 19:40

## 2019-01-04 RX ADMIN — HALOPERIDOL 5 MG: 5 TABLET ORAL at 08:58

## 2019-01-04 RX ADMIN — BENZTROPINE MESYLATE 0.5 MG: 0.5 TABLET ORAL at 08:58

## 2019-01-04 RX ADMIN — BENZTROPINE MESYLATE 0.5 MG: 0.5 TABLET ORAL at 19:37

## 2019-01-04 ASSESSMENT — ACTIVITIES OF DAILY LIVING (ADL)
ORAL_HYGIENE: INDEPENDENT
LAUNDRY: UNABLE TO COMPLETE
HYGIENE/GROOMING: INDEPENDENT
DRESS: INDEPENDENT

## 2019-01-04 NOTE — PROGRESS NOTES
Patient requested to speak with writer about making a new referral to an IRTs that will allow smoking. Writer did contact other IRTs and some do allow smoking. Any [facility] facility will not allow smoking at any of their facilites. Writer consulted with SUMIT Cisneros, prior to speaking again with pt, who stated that it may prolong pt's hospitalization and that pt wants discharge ASAP. Writer did relay this to patient who decided to stick with [facility].     Writer received VM message from Sheila from [facility] stating they can admit patient on Wednesday January 9th to [facility]. They want 30 day supply of medication, follow up psychiatry, discharge summary and signed med orders. FAX number [phone].

## 2019-01-04 NOTE — PROVIDER NOTIFICATION
01/03/19 1600   Sitting Orthostatic BP   Sitting Orthostatic /87   Sitting Orthostatic Pulse 88 bpm   Standing Orthostatic BP   Standing Orthostatic /79   Standing Orthostatic Pulse 93 bpm     On call provider notified of SBP drop greater than 20 points. Pt denies dizziness, denies changes to vision or balance. Rn interventions include increased fluids and ambulation. Also encouraged pt to rise slowly from sitting/laying down. Discussed fall risks, and placed on fall risk precautions. Ortho BP recheck WNL.

## 2019-01-04 NOTE — PLAN OF CARE
Pt had episode of agitation on previous shift, and received PRN medication for symptoms. This evening she appears calm and is cooperative. She reports frustration related to length of stay and notes that her frustration with her current situation precipitated her earlier behavior, however she verbalizes awareness of appropriate methods of managing stressors and reports she has been reading her Quran this evening which has been immensely helpful. She contracts for safety, SI and SIB. She states she will notify staff in the event that she experiences SI/SIB or experiences poor frustration tolerance. She demonstrates awareness of next steps related to discharge planning and is motivated to participate in treatment. A few of her personal belongings were returned to her this evening including toiletries. [facility] to ensure [facility] and close monitoring. She is medication compliant and has not requested or required PRN medications this shift. She denies SEs to medication and denies physical health concerns. Will continue to monitor.

## 2019-01-05 PROCEDURE — 12400001 ZZH R&B MH UMMC

## 2019-01-05 PROCEDURE — 25000132 ZZH RX MED GY IP 250 OP 250 PS 637: Performed by: NURSE PRACTITIONER

## 2019-01-05 PROCEDURE — 25000132 ZZH RX MED GY IP 250 OP 250 PS 637: Performed by: PSYCHIATRY & NEUROLOGY

## 2019-01-05 RX ADMIN — BENZTROPINE MESYLATE 0.5 MG: 0.5 TABLET ORAL at 09:41

## 2019-01-05 RX ADMIN — BENZTROPINE MESYLATE 0.5 MG: 0.5 TABLET ORAL at 19:32

## 2019-01-05 RX ADMIN — NICOTINE POLACRILEX 4 MG: 2 GUM, CHEWING BUCCAL at 18:08

## 2019-01-05 RX ADMIN — HALOPERIDOL 5 MG: 5 TABLET ORAL at 19:32

## 2019-01-05 RX ADMIN — OLANZAPINE 10 MG: 10 TABLET, ORALLY DISINTEGRATING ORAL at 19:33

## 2019-01-05 RX ADMIN — HALOPERIDOL 5 MG: 5 TABLET ORAL at 09:41

## 2019-01-05 ASSESSMENT — ACTIVITIES OF DAILY LIVING (ADL)
HYGIENE/GROOMING: SHOWER;INDEPENDENT
DRESS: INDEPENDENT
ORAL_HYGIENE: INDEPENDENT
ORAL_HYGIENE: INDEPENDENT
HYGIENE/GROOMING: INDEPENDENT
DRESS: INDEPENDENT

## 2019-01-05 NOTE — PROGRESS NOTES
01/05/19 1517   Significant Event   Significant Event Other (see comments)  (shift summary)   Pt had an uneventful shift. She was isolative to her room/bed for almost entire shift, coming out only for basic needs and requests. During 1:1 check-in she states she's hopeful now that she has a estimated time of departure (next week to an IRTS). Pt confirmed when prompted that she was mostly isolative d/t the level of dysfunction caused by the [facility]. Of note, this same observation of the milieu was made by several other (also isolative) pts. Pt asked to take a shower and did so. Denies all MH sx, no sx observed.

## 2019-01-05 NOTE — PROGRESS NOTES
[facility], [place]   Psychiatric Progress Note  Hospital Day: 37        Interim History:   The patient's care was discussed with the treatment team during the daily team meeting and/or staff's chart notes were reviewed.  Patient was accepted to [facility] after phone interview. She became upset when told of time to bed availability and tried to elope. She has been doing better since without any behavioral issues.    Upon interview, the patient is in bed and doesn't make eye contact. I indicate that we will continue to look at other options in hopes of getting her out of here sooner. She thanked me for that. No questions or concerns and denies symptoms.    Psychiatric Symptoms: paranoia improving    Medication side effects reported: shakes improved with cogentin    Other issues reported by patient: None         Medications:       benztropine  0.5 mg Oral BID     haloperidol  5 mg Oral BID    Or     haloperidol lactate  5 mg Intramuscular BID          Allergies:   No Known Allergies       Labs:     No results found for this or any previous visit (from the past 48 hour(s)).       Psychiatric Examination:     /62   Pulse 87   Temp 97.8  F (36.6  C) (Oral)   Resp 16   Wt 92.3 kg (203 lb 8 oz)   LMP 10/31/2018 (Approximate)   SpO2 98%   BMI 32.85 kg/m    Weight is 203 lbs 8 oz  Body mass index is 32.85 kg/m .      Orthostatic Vitals       Most Recent      Sitting Orthostatic /62 01/04 1630    Sitting Orthostatic Pulse (bpm) 87 01/04 1630    Standing Orthostatic /70 01/04 1630    Standing Orthostatic Pulse (bpm) 90 01/04 1630              Appearance: awake, alert, adequately groomed and dressed in hospital scrubs  Attitude:  somewhat cooperative  Eye Contact:  poor   Mood:  angry  Affect:  intensity is blunted  Speech:  paucity of speech  Language: fluent and intact in English  Psychomotor, Gait, Musculoskeletal:  no evidence of tardive dyskinesia, dystonia, or  tics  Throught Process:  linear and goal oriented  Associations:  no loose associations  Thought Content:  no evidence of suicidal ideation or homicidal ideation and denies hallucinations  Insight:  fair  Judgement:  intact  Oriented to:  time, person, and place  Attention Span and Concentration:  intact  Recent and Remote Memory:  intact  Fund of Knowledge:  adequate    Clinical Global Impressions  First:  Considering your total clinical experience with this particular patient population, how severe are the patient's symptoms at this time?: 7 (11/28/18 1001)  Compared to the patient's condition at the START of treatment, this patient's condition is:: 4 (11/28/18 1001)  Most recent:  Considering your total clinical experience with this particular patient population, how severe are the patient's symptoms at this time?: 7 (01/03/19 1239)  Compared to the patient's condition at the START of treatment, this patient's condition is:: 2 (01/03/19 1239)               Precautions:     Behavioral Orders   Procedures     Assault precautions     Code 1 - Restrict to Unit     Elopement precautions     Fall precautions     Routine Programming     As clinically indicated     Status 15     Every 15 minutes.     Suicide precautions     Patients on Suicide Precautions should have a Combination Diet ordered that includes a Diet selection(s) AND a Behavioral Tray selection for Safe Tray - with utensils, or Safe Tray - NO utensils            Diagnoses:      1.  Schizoaffective disorder, bipolar type. (F25.0)  2.  Provisional diagnosis of stimulant use disorder, cocaine type.  (F14.10)  3.  Likely cannabis use disorder, moderate to severe. (F12.20)  4.  Cigarette nicotine dependence, currently withdrawal. (F17.213)         Assessment & Plan:   Assessment and hospital summary:  28-year-old woman admitted after she called 911 due to belief she was being followed. This is a common theme for her. Initially she was quite paranoid here. Since  arrival she has mostly kept to herself, but has had some aggressive behaviors related to her paranoia. In addition, she snuck a phone onto the unit, which has not been put in her locker. She initially agreed to a trial of clozapine, but refused blood draws needed to start this, so olanzapine was continued instead. This was changed to haloperidol due to lack of efficacy. The haloperidol was initiated as an emergency medication due to aggressive and self-injurious behaviors as haloperidol was not on her [facility] order. She has been doing well with this change.    Target psychiatric symptoms and interventions:  Psychosis  -continue haloperidol 5 mg BID.  -continue cogentin 0.5 mg BID due to complaints of shakes with haloperidol      Medical Problems and Treatments:  None acute    Behavioral/Psychological/Social:  Encourage unit programming      Disposition Plan   Reason for ongoing admission: poses an imminent risk to self, poses an imminent risk to others and is unable to care for self due to severe psychosis or jacqueline  Discharge location: [facility] facility  Discharge Medications: not ordered  Follow-up Appointments: not scheduled  Legal Status: full commitment with [facility] for clozapine, olanzapine, aripiprazole, lurasidone. Patient had hearing to amend Aranda on 1/02/19 (started by outpatient provider) I had redone the note to request haloperidol, risperidone, chlorpromazine, and clozapine.  Entered by: Cy Sumner on 1/4/2019 at 11:32 PM

## 2019-01-05 NOTE — PLAN OF CARE
Pt had a pleasant evening. She was visible in milieu socializing with peers.  Pt is fully alert and oriented. Pt stable enough for discharge but patiently awaiting placement. Pt's family visited this evening, and it went well. Pt is compliant with medication and denies SE's. Vital sign stable.

## 2019-01-06 PROCEDURE — 25000132 ZZH RX MED GY IP 250 OP 250 PS 637: Performed by: NURSE PRACTITIONER

## 2019-01-06 PROCEDURE — 12400001 ZZH R&B MH UMMC

## 2019-01-06 PROCEDURE — 25000132 ZZH RX MED GY IP 250 OP 250 PS 637: Performed by: PSYCHIATRY & NEUROLOGY

## 2019-01-06 RX ADMIN — HALOPERIDOL 5 MG: 5 TABLET ORAL at 19:01

## 2019-01-06 RX ADMIN — BENZTROPINE MESYLATE 0.5 MG: 0.5 TABLET ORAL at 19:01

## 2019-01-06 RX ADMIN — BENZTROPINE MESYLATE 0.5 MG: 0.5 TABLET ORAL at 08:26

## 2019-01-06 RX ADMIN — NICOTINE POLACRILEX 4 MG: 2 GUM, CHEWING BUCCAL at 18:47

## 2019-01-06 RX ADMIN — OLANZAPINE 10 MG: 10 TABLET, ORALLY DISINTEGRATING ORAL at 19:00

## 2019-01-06 RX ADMIN — HALOPERIDOL 5 MG: 5 TABLET ORAL at 08:26

## 2019-01-06 ASSESSMENT — ACTIVITIES OF DAILY LIVING (ADL)
LAUNDRY: UNABLE TO COMPLETE
ORAL_HYGIENE: INDEPENDENT
DRESS: SCRUBS (BEHAVIORAL HEALTH)
LAUNDRY: WITH SUPERVISION
DRESS: INDEPENDENT
HYGIENE/GROOMING: INDEPENDENT
ORAL_HYGIENE: INDEPENDENT
HYGIENE/GROOMING: INDEPENDENT

## 2019-01-06 NOTE — PROGRESS NOTES
Pt had a pleasant evening. No agitating behaviors. She was isolative to her room for most of this evening. She remains medication compliant and denies SE s. Pt remains focused on discharge.

## 2019-01-06 NOTE — PROGRESS NOTES
Pt had a unremarkable shift. She was isolative to her room for most of the day. Pt denies any suicidal ideation or behavior. Pt denies experiencing any visual or auditory hallucinations. No visitors this evening.        01/05/19 2000   Behavioral Health   Hallucinations denies / not responding to hallucinations   Orientation person: oriented;place: oriented;date: oriented;time: oriented   Memory baseline memory   Insight insight appropriate to situation;insight appropriate to events   Judgement impaired   Eye Contact at examiner   Affect full range affect   Mood mood is calm   Physical Appearance/Attire neat   Hygiene well groomed   Suicidality other (see comments)  (pt denies)   1. Wish to be Dead No   2. Non-Specific Active Suicidal Thoughts  No   Self Injury other (see comment)  (pt denies)   Activity isolative   Speech clear;coherent   Medication Sensitivity no stated side effects;no observed side effects   Psychomotor / Gait balanced;steady   Activities of Daily Living   Hygiene/Grooming independent   Oral Hygiene independent   Dress independent   Room Organization independent   Behavioral Health Interventions   Social and Therapeutic Interventions (Psychotic Symptoms) encourage socialization with peers;encourage effective boundaries with peers;encourage participation in therapeutic groups and milieu activities

## 2019-01-06 NOTE — PLAN OF CARE
Nursing assessment completed; assessed mood, anxiety, thoughts, and behavior. No significant change in clinical presentation this period: patient's mood appears to be stable and she denies all acute psychotic symptoms; however, she has not been going to groups and is not socially engaged with staff and peers. Takes her medications as ordered and reports no adverse side effects.  Denies hallucinations, denies any thought or intent to harm self or others; also denies depression and anxiety both of which she rated at 0 out of 10, where ten is the worst case of clinical presentation. Affect is full range and responsive when engaged, speech is clear and coherent with no tangents and she appears appropriate for condition/situation, good hygiene and grooming. Thought process appears to be rational and focused on current treatment activities, but insight into condition is only fair at best; judgment intact. No threatening or other aggressive behaviors this period. Will continue to assess per protocol. Refer to daily notes for implementation of individualized treatment plan.

## 2019-01-07 PROCEDURE — 25000132 ZZH RX MED GY IP 250 OP 250 PS 637: Performed by: NURSE PRACTITIONER

## 2019-01-07 PROCEDURE — 12400001 ZZH R&B MH UMMC

## 2019-01-07 PROCEDURE — 99232 SBSQ HOSP IP/OBS MODERATE 35: CPT | Performed by: PSYCHIATRY & NEUROLOGY

## 2019-01-07 PROCEDURE — G0177 OPPS/PHP; TRAIN & EDUC SERV: HCPCS

## 2019-01-07 PROCEDURE — 25000132 ZZH RX MED GY IP 250 OP 250 PS 637: Performed by: PSYCHIATRY & NEUROLOGY

## 2019-01-07 RX ORDER — NICOTINE 21 MG/24HR
1 PATCH, TRANSDERMAL 24 HOURS TRANSDERMAL EVERY 24 HOURS
Qty: 30 PATCH | Refills: 0 | Status: ON HOLD | OUTPATIENT
Start: 2019-01-07 | End: 2019-05-20

## 2019-01-07 RX ORDER — LORAZEPAM 2 MG/1
2 TABLET ORAL EVERY 6 HOURS PRN
Qty: 30 TABLET | Refills: 0 | Status: ON HOLD | OUTPATIENT
Start: 2019-01-07 | End: 2019-05-20

## 2019-01-07 RX ORDER — HALOPERIDOL 5 MG/1
5 TABLET ORAL 2 TIMES DAILY
Qty: 60 TABLET | Refills: 0 | Status: ON HOLD | OUTPATIENT
Start: 2019-01-07 | End: 2019-05-20

## 2019-01-07 RX ORDER — LORAZEPAM 2 MG/1
2 TABLET ORAL EVERY 6 HOURS PRN
Qty: 30 TABLET | Refills: 0 | Status: SHIPPED | OUTPATIENT
Start: 2019-01-07 | End: 2019-01-07

## 2019-01-07 RX ORDER — BENZTROPINE MESYLATE 0.5 MG/1
0.5 TABLET ORAL 2 TIMES DAILY
Qty: 60 TABLET | Refills: 0 | Status: ON HOLD | OUTPATIENT
Start: 2019-01-07 | End: 2019-05-20

## 2019-01-07 RX ORDER — OLANZAPINE 10 MG/1
10 TABLET ORAL 2 TIMES DAILY PRN
Qty: 60 TABLET | Refills: 0 | Status: ON HOLD | OUTPATIENT
Start: 2019-01-07 | End: 2019-05-20

## 2019-01-07 RX ADMIN — HALOPERIDOL 5 MG: 5 TABLET ORAL at 19:22

## 2019-01-07 RX ADMIN — HALOPERIDOL 5 MG: 5 TABLET ORAL at 09:18

## 2019-01-07 RX ADMIN — BENZTROPINE MESYLATE 0.5 MG: 0.5 TABLET ORAL at 19:22

## 2019-01-07 RX ADMIN — BENZTROPINE MESYLATE 0.5 MG: 0.5 TABLET ORAL at 09:18

## 2019-01-07 RX ADMIN — OLANZAPINE 10 MG: 10 TABLET, ORALLY DISINTEGRATING ORAL at 19:22

## 2019-01-07 ASSESSMENT — ACTIVITIES OF DAILY LIVING (ADL)
LAUNDRY: UNABLE TO COMPLETE
LAUNDRY: WITH SUPERVISION
HYGIENE/GROOMING: INDEPENDENT
HYGIENE/GROOMING: INDEPENDENT
DRESS: INDEPENDENT;SCRUBS (BEHAVIORAL HEALTH)
ORAL_HYGIENE: INDEPENDENT
DRESS: SCRUBS (BEHAVIORAL HEALTH);INDEPENDENT
ORAL_HYGIENE: INDEPENDENT

## 2019-01-07 NOTE — PROGRESS NOTES
01/06/19 2200   Behavioral Health   Hallucinations denies / not responding to hallucinations   Thinking other (see comment)   Orientation place: oriented;person: oriented   Memory baseline memory   Insight poor   Judgement impaired   Eye Contact at examiner   Affect blunted, flat;irritable   Mood mood is calm   Physical Appearance/Attire attire appropriate to age and situation   Hygiene well groomed   1. Wish to be Dead No   2. Non-Specific Active Suicidal Thoughts  No   Activities of Daily Living   Hygiene/Grooming independent   Oral Hygiene independent   Dress scrubs (behavioral health)   Laundry unable to complete   Room Organization independent   Patient was isolative in her room during this shift. She came out of her room a couple of times to ask for coffee and goes back as soon as she get it. She did not interact with staff or others, but she ate dinner without any issue. She did not take a shower this evening.

## 2019-01-07 NOTE — PROGRESS NOTES
Pt had a good, calm shift. Pt slept majority of the day, did not want breakfast and ate some lunch. Pt went to the 1330 group. Pt was quiet and stuck to herself during group but brighten up upon approach. Pt said she is liking her medication and has no questions or concerns about them. Pt denied any SI/SIB/AH and VA, along with no anxiety or depression.       01/07/19 1438   Behavioral Health   Hallucinations denies / not responding to hallucinations   Thinking other (see comment)  (EMILY)   Orientation place: oriented;date: oriented;time: oriented   Insight other (see comment)  (EMILY)   Judgement (EMILY)   Eye Contact at examiner   Affect full range affect   Mood mood is calm   Physical Appearance/Attire attire appropriate to age and situation;appears stated age   Hygiene other (see comment)  (adequate)   Suicidality other (see comments)  (Pt denies)   1. Wish to be Dead No   2. Non-Specific Active Suicidal Thoughts  No   Self Injury other (see comment)  (pt denies)   Elopement (none observed)   Activity isolative;other (see comment)  (Went to 1330 group)   Speech coherent;clear   Medication Sensitivity no observed side effects;no stated side effects   Psychomotor / Gait balanced;steady   Activities of Daily Living   Hygiene/Grooming independent   Oral Hygiene independent   Dress scrubs (behavioral health);independent   Laundry unable to complete   Room Organization independent

## 2019-01-07 NOTE — PROGRESS NOTES
Writer spoke with Jessie ([phone]) who works with Puneet ([phone]) at [facility]'s requesting the Pd be completed prior to pt's discharge on Wednesday.

## 2019-01-08 VITALS
RESPIRATION RATE: 16 BRPM | WEIGHT: 203.5 LBS | SYSTOLIC BLOOD PRESSURE: 127 MMHG | DIASTOLIC BLOOD PRESSURE: 75 MMHG | BODY MASS INDEX: 32.85 KG/M2 | OXYGEN SATURATION: 99 % | HEART RATE: 92 BPM | TEMPERATURE: 97.2 F

## 2019-01-08 PROCEDURE — 12400001 ZZH R&B MH UMMC

## 2019-01-08 PROCEDURE — 25000132 ZZH RX MED GY IP 250 OP 250 PS 637: Performed by: NURSE PRACTITIONER

## 2019-01-08 PROCEDURE — 99239 HOSP IP/OBS DSCHRG MGMT >30: CPT | Performed by: PSYCHIATRY & NEUROLOGY

## 2019-01-08 PROCEDURE — G0177 OPPS/PHP; TRAIN & EDUC SERV: HCPCS

## 2019-01-08 PROCEDURE — 25000132 ZZH RX MED GY IP 250 OP 250 PS 637: Performed by: PSYCHIATRY & NEUROLOGY

## 2019-01-08 RX ADMIN — NICOTINE POLACRILEX 4 MG: 2 GUM, CHEWING BUCCAL at 16:46

## 2019-01-08 RX ADMIN — HALOPERIDOL 5 MG: 5 TABLET ORAL at 09:37

## 2019-01-08 RX ADMIN — BENZTROPINE MESYLATE 0.5 MG: 0.5 TABLET ORAL at 09:37

## 2019-01-08 RX ADMIN — BENZTROPINE MESYLATE 0.5 MG: 0.5 TABLET ORAL at 19:16

## 2019-01-08 RX ADMIN — HALOPERIDOL 5 MG: 5 TABLET ORAL at 19:16

## 2019-01-08 RX ADMIN — OLANZAPINE 10 MG: 10 TABLET, ORALLY DISINTEGRATING ORAL at 19:16

## 2019-01-08 ASSESSMENT — ACTIVITIES OF DAILY LIVING (ADL)
DRESS: SCRUBS (BEHAVIORAL HEALTH)
ORAL_HYGIENE: INDEPENDENT
HYGIENE/GROOMING: INDEPENDENT

## 2019-01-08 NOTE — DISCHARGE SUMMARY
[facility], [place]   Psychiatric Discharge Summary  Time: 38 minutes on encounter.    Ilene Liu MRN# [id number]   Age: 29 year old YOB: 1990     Date of Admission:  11/27/2018  Date of Discharge:  01/09/19  Admitting Physician:  Nathan Vincent  Discharge Physician:  Nathan Vincent         Event Leading to Hospitalization and Hospital Course:   Ilene Liu was admitted for extreme paranoia and potential psychotic symptoms possible substance use.       See Admission note by Burak on 11/28 for additional details.     Ilene Liu was initially held on a 72-hour hold however she was also committed and her provisional discharge was revoked.  She has a Aranda for haloperidol, risperidone, chlorpromazine, clozapine.  Initially during hospitalization she was highly volatile and was disruptive on the unit as well as potentially violent.  She was previously taking olanzapine however that did not seem to be beneficial.  She was also given a short trial of clozapine but refused blood draws.  Haloperidol was initiated and continue to titrate up to 5 mg twice a day and improved with that dosage of medication.  During her hospitalization she did have behaviors that led her to have 2 to one staffing.  She swallowed an object which was passed most likely in a behavioral reaction.  Her psychotic symptoms and paranoia resolved and she was participating more adequately on the unit.  She has not required any further increase in staff monitoring for 2 weeks.  There is still some concern about her developmental level and her ability to understand things.  She is no longer having auditory hallucinations and has had movement in her behaviors.  She is anxious about discharging from the hospital and is going to follow-up with her outpatient treatment team.  On day of discharge she says that she is ready to go tomorrow and that she does not have any hopelessness or  helplessness thoughts of harming herself or others any self-injurious behaviors or urges to do so.  She has not had any recent aggressive behaviors and has been pleasant on the unit.  She has not had any sleep problems or side effects related to the medications.  Denies any current hallucinations or paranoia or guilty feelings or grandiose ideas or anhedonia.  Her attention concentration continues to be limited and she has limited cognition.  We discussed safety planning and future plans.           DIagnoses:   Schizoaffective disorder bipolar type versus substance-induced psychosis  Cocaine use disorder  Tobacco use disorder  Cannabis use disorder  Likely intellectual disability  Borderline personality disorder         Labs:   No results found for this or any previous visit (from the past 24 hour(s)).         Consults:   Consultation during this admission received from internal medicine    Primarily complained about vaginal odor that has now resolved possibly related to psychosis or poor hygiene.  Previously swallowed an object which past and led to no further problems.           Discharge Medications:        Review of your medicines      START taking      Dose / Directions   benztropine 0.5 MG tablet  Commonly known as:  COGENTIN      Dose:  0.5 mg  Take 1 tablet (0.5 mg) by mouth 2 times daily  Quantity:  60 tablet  Refills:  0     haloperidol 5 MG tablet  Commonly known as:  HALDOL      Dose:  5 mg  Take 1 tablet (5 mg) by mouth 2 times daily  Quantity:  60 tablet  Refills:  0     LORazepam 2 MG tablet  Commonly known as:  ATIVAN      Dose:  2 mg  Take 1 tablet (2 mg) by mouth every 6 hours as needed for agitation or anxiety  Quantity:  30 tablet  Refills:  0     nicotine 14 MG/24HR 24 hr patch  Commonly known as:  NICODERM CQ      Dose:  1 patch  Place 1 patch onto the skin every 24 hours  Quantity:  30 patch  Refills:  0     nicotine polacrilex 4 MG gum  Commonly known as:  NICORETTE      Dose:  4 mg  Place 1  each (4 mg) inside cheek every hour as needed for smoking cessation  Quantity:  150 each  Refills:  0        CONTINUE these medicines which may have CHANGED, or have new prescriptions. If we are uncertain of the size of tablets/capsules you have at home, strength may be listed as something that might have changed.      Dose / Directions   OLANZapine 10 MG tablet  Commonly known as:  zyPREXA  This may have changed:      when to take this    reasons to take this    Another medication with the same name was removed. Continue taking this medication, and follow the directions you see here.      Dose:  10 mg  Take 1 tablet (10 mg) by mouth 2 times daily as needed (agitation, anxiety) (take in addition to olanzapine 5 mg every morning)  Quantity:  60 tablet  Refills:  0        STOP taking    buPROPion 150 MG 24 hr tablet  Commonly known as:  WELLBUTRIN XL        hydrOXYzine 25 MG tablet  Commonly known as:  ATARAX        vitamin D2 38490 units (1250 mcg) capsule  Commonly known as:  ERGOCALCIFEROL              Where to get your medicines      These medications were sent to [facility] [address]  [address] [address]    Phone:  [phone]     benztropine 0.5 MG tablet    haloperidol 5 MG tablet    nicotine 14 MG/24HR 24 hr patch    nicotine polacrilex 4 MG gum    OLANZapine 10 MG tablet     Some of these will need a paper prescription and others can be bought over the counter. Ask your nurse if you have questions.    Bring a paper prescription for each of these medications    LORazepam 2 MG tablet              Mental Status Examination:   Ilene is a 28-year-old female with short black hair and is overweight.  Her speech is of an appropriate rate and tone and her language is intact.  Her behavior is pacing at times.  Her affect is neutral.  Her mood she describes as good.  Her thoughts consists of the above without thoughts of harming herself or others or  delusional content.  Thought process is less ruminative and is concrete without looseness of association.  She no longer has abnormal perceptions.  She is alert and aware of her current location and circumstances.  Her attention and concentration appear limited.  Her cognition and fund of knowledge appear below average.  Her long-term/short-term/remote memory appear limited.  Her insight and judgment are both impaired.         Discharge Plan:        Medication Therapy Management Referral      Reason for your hospital stay    Schizoaffective disorder     Activity    Your activity upon discharge: activity as tolerated     Discharge Instructions    1. Please do not harm yourself or others.  2. Please continue to take your medications.  3. Please follow up with your outpatient care team.  4. Please do not take drugs or alcohol as this will worsen your condition.  5. Please do not take more than the prescribed doses of medications as this may make them dangerous.   6. Please follow your safety plan of action.  7. Please call crisis if having trouble.  8. If having thoughts of harming self or others please come in to the emergency department as soon as possible.     Full Code     Diet    Follow this diet upon discharge: Orders Placed This Encounter      Regular Diet Adult No Pork           Further instructions from your care team       Behavioral Discharge Planning and Instructions      Summary  You were admitted to [facility] under the care of Dr Sumner due to psychosis, particularly paranoid delusions.  You had also stopped taking medication.  You were agitated and threatening harm to family members.      While in the hospital, you had episodic agitation and aggression.   You also had some self harm behavior (swallowing a pencil.)  Your symptoms gradually improved with treatment.    While in the hospital, an updated CADI assessment was done on 12-14-18 with Shaniqua Anderson of [facility].    Your original commitment:   7-10-18 as MI to [facility] and Commissioner of Human Services.   Aranda order for:  Clozaril, Zyprexa, Abilify, and Latuda.        On 1-2-19, there was a hearing to amend the original Aranda order and to extend your commitment.   That hearing resulted in your commitment and Aranda order being extended for 9 months.     A local remote Provisional Discharge was coordinated with [facility].    While in the hospital, a new placement had to be found.    You are being discharged today to [facility].       Main Diagnosis: 1.  Schizoaffective disorder, bipolar type. (F25.0)  2.  Provisional diagnosis of stimulant use disorder, cocaine type.  (F14.10)  3.  Likely cannabis use disorder, moderate to severe. (F12.20)    Housing Placement information:  [facility]  [address] [place] [address]  Phone: [phone]    Mental Health Follow-Up:   [facility]  Dr Angela Muller - Next appointment:  Tuesday January 22nd @ 2:00 PM  [address] [place] [address], [address]  Phone:  [phone]    FAX:   [phone]   [facility] to fax discharge summary.      Your [facility]  is Amelia at [phone].    Amelia is covering for Anival Benoit, who is on maternity leave.  The [facility] supervisor is Radha Chinchilla at [phone].  Attend all scheduled appointments with your outpatient providers. Call at least 24 hours in advance if you need to reschedule an appointment to ensure continued access to your outpatient providers.   Major Treatments, Procedures and Findings:  You were provided with: a psychiatric assessment, assessed for medical stability, medication evaluation and/or management and group therapy    Symptoms to Report: feeling more aggressive, increased confusion, losing more sleep, mood getting worse or thoughts of suicide    Early warning signs can include: increased depression or anxiety sleep disturbances increased thoughts or behaviors of suicide or self-harm  increased  "unusual thinking, such as paranoia or hearing voices    Safety and Wellness:  Take all medicines as directed.  Make no changes unless your doctor suggests them.      Follow treatment recommendations.  Refrain from alcohol and non-prescribed drugs.  If there is a concern for safety, call 511.    Resources:   Crisis Intervention: 858.647.4476 or 727-556-4516 (TTY: 681.310.4617).  Call anytime for help.  National [place] on Mental Illness (www.[place].jsoe.org): 246.176.5719 or 171-442-0685.  Suicide Awareness Voices of Education (SAVE) (www.save.org): 817-139-UFHS (0628)  National Suicide Prevention Line (www.mentalhealthmn.org): 252-535-BHXR (1908)  Mental Health Consumer/Survivor Network of [place] (www.mhcsn.net): 294.328.9094 or 747-608-9241  Mental Health Association of [place] (www.mentalhealth.org): 494.976.5150 or 323-773-0859  Self- Management and Recovery Training., SMART-- Toll free: [phone]  www.[employer]  [facility] Crisis (COPE) Response - Adult 870 755-3404  Text 4 Life: txt \"LIFE\" to 14911 for immediate support and crisis intervention  Crisis text line: Text \"[place]\" to 622003. Free, confidential, 24/7.  Crisis Intervention: 291.467.1589 or 924-776-6731. Call anytime for help.   [facility] Crisis Team - Child: [phone]    The treatment team has appreciated the opportunity to work with you. If you have any questions or concerns our unit number is [phone].          Please send copy to Angela Muller    During hospitalizations patients have perpetuating, complicating, situational, acute, and chronic conditions that lead to risk for suicidality or homicidality. During hospitalizations we mitigate any modifiable risk factors that may have been identified in the history and physical examination and throughout the hospitalization. Chronic non-modifiable risk factors are not able to be changed with acute hospitalization. As a patient that is discharging these risk factors have " been modified as much as possible and a supportive network and safety plan has been put in place. Further modifications and assistance will have to be obtained in the outpatient setting and the inpatient hospitalization team is no longer responsible for outcomes.    Nathan Vincent  [facility] Psych[facility]      The following document has been created with voice recognition software and may contain unintentional word substitutions.      Non clinically relevant CMS requirements:  Clinical Global Impressions  First:  Considering your total clinical experience with this particular patient population, how severe are the patient's symptoms at this time?: 7 (11/28/18 1001)  Compared to the patient's condition at the START of treatment, this patient's condition is:: 4 (11/28/18 1001)  Most recent:  Considering your total clinical experience with this particular patient population, how severe are the patient's symptoms at this time?: 7 (01/03/19 1239)  Compared to the patient's condition at the START of treatment, this patient's condition is:: 2 (01/03/19 1239)

## 2019-01-08 NOTE — PROGRESS NOTES
Pt had a pleasant evening. She spent most of the evening in her room watching TV. Her Mom visits and it went well. Her mom took her cell-phone home this evening per Ilene request. Pt belonging list updated. Pt is looking forward to discharging on Wednesday. She remains medication compliant and denies SE s. Pt denies SI/SIB.

## 2019-01-08 NOTE — PROGRESS NOTES
Case Management Note  1/8/2019    discharge planning: Pt to discharge to [facility] 1/9/19. Address: [address] [place] [address]. Phone: [phone]. Targeted arrival of 10:30 AM. AM staff to order cab. Writer called [facility] to confirm time as there were conflicting staff reports between 9:30 or 10:30 AM. Writer did not hear back.     Nikki Leonard, MPS, LPC, LADC

## 2019-01-08 NOTE — PROGRESS NOTES
[facility], [place]   Psychiatric Progress Note  Ilene Liu  [id number]  01/07/19    Chief Complaint: Continued medical care          Interim History:   The patient's care was discussed with the treatment team during the daily team meeting and/or staff's chart notes were reviewed.  Staff report patient has been staying to her room has not had any aggressive behaviors for her discharged on potentially the ninth of this month and slept about 6 hours.    Upon visiting with her she says that she is feeling good and is looking forward to getting out of hospital.  She is future oriented not having any hopelessness or helplessness or thoughts of hurting herself or others.  She is not having any side effects from her current medications and believes they are beneficial.  Denies any guilty feelings or grandiose ideas or any hallucinations or paranoia.  Is anxious to get out of the hospital but feels stable to discharge.  She asks about her Social Security disability potentially being approved which I do not have information about.  She denies any sleep problems or attention or concentration issues and we discussed preparing her for discharge.         Medications:       benztropine  0.5 mg Oral BID     haloperidol  5 mg Oral BID    Or     haloperidol lactate  5 mg Intramuscular BID          Allergies:   No Known Allergies       Labs:   No results found for this or any previous visit (from the past 24 hour(s)).       Psychiatric Examination:     /71   Pulse 84   Temp 96.9  F (36.1  C) (Tympanic)   Resp 16   Wt 92.3 kg (203 lb 8 oz)   LMP 10/31/2018 (Approximate)   SpO2 99%   BMI 32.85 kg/m    Weight is 203 lbs 8 oz  Body mass index is 32.85 kg/m .                Sitting Orthostatic BP: 119/66      Sitting Orthostatic Pulse: 84 bpm      Standing Orthostatic BP: 115/64      Standing Orthostatic Pulse: 101 bpm       Weight over time:  Vitals:    12/08/18 0900   Weight: 92.3 kg  (203 lb 8 oz)       Orthostatic Vitals       Most Recent      Sitting Orthostatic /66 01/07 1604    Sitting Orthostatic Pulse (bpm) 84 01/07 1604    Standing Orthostatic /64 01/07 1604    Standing Orthostatic Pulse (bpm) 101 01/07 1604            Ilene is a 28-year-old female with short black hair and is overweight.  Her speech is of an appropriate rate and tone and her language is intact.  Her behavior is pacing at times.  Her affect is calm.  Her mood she describes as good.  Her thoughts consists of the above without thoughts of harming herself or others or delusional content.  Thought process is less ruminative and is concrete without looseness of association.  She no longer has abnormal perceptions.  She is alert and aware of her current location and circumstances.  Her attention and concentration appear limited.  Her cognition and fund of knowledge appear below average.  Her long-term/short-term/remote memory appear limited.  Her insight and judgment are both impaired.         Precautions:     Behavioral Orders   Procedures     Assault precautions     Code 1 - Restrict to Unit     Elopement precautions     Fall precautions     Routine Programming     As clinically indicated     Status 15     Every 15 minutes.     Suicide precautions     Patients on Suicide Precautions should have a Combination Diet ordered that includes a Diet selection(s) AND a Behavioral Tray selection for Safe Tray - with utensils, or Safe Tray - NO utensils            DIagnoses:     Questionable schizoaffective disorder bipolar type  Cocaine use disorder  Tobacco use disorder  Cannabis use disorder  Rule out intellectual disability  Borderline personality disorder         Assessment & Plan:     Ilene has been doing relatively well on the unit has not had any aggressive behaviors and only attempted to escape recently when she heard about the long wait time to discharge from hospital.  She has been relatively patient with this  process based on her level of function.  She feels as though her medication is highly beneficial and she would like to continue it unchanged.  I believe that she is currently improved and is behaving well on the unit and would do well in the community.  We await her discharge on Wednesday.    Currently committed with Aranda of haloperidol, risperidone, chlorpromazine, clozapine    The risks, benefits, alternatives and side effects have been discussed and are understood by the patient and other caregivers.      Nathan Vincent  [facility] Psychiatry      The following document has been created with voice recognition software and may contain unintentional word substitutions.    Non clinically relevant CMS requirements:  Clinical Global Impressions  First:  Considering your total clinical experience with this particular patient population, how severe are the patient's symptoms at this time?: 7 (11/28/18 1001)  Compared to the patient's condition at the START of treatment, this patient's condition is:: 4 (11/28/18 1001)  Most recent:  Considering your total clinical experience with this particular patient population, how severe are the patient's symptoms at this time?: 7 (01/03/19 1239)  Compared to the patient's condition at the START of treatment, this patient's condition is:: 2 (01/03/19 1239)

## 2019-01-08 NOTE — PLAN OF CARE
Pt attended the afternoon OT leisure activity.  Initially apprehensive as she seems to be with most new activities, though settles in with encouragement.  Follows in with activity, struggles a bit to think quickly of answers, though when given suggestions/ideas and extra time, is able think of a few, and seems to have fun.

## 2019-01-08 NOTE — DISCHARGE INSTRUCTIONS
Behavioral Discharge Planning and Instructions      Summary  You were admitted to [facility] under the care of Dr Sumner due to psychosis, particularly paranoid delusions.  You had also stopped taking medication.  You were agitated and threatening harm to family members.      While in the hospital, you had episodic agitation and aggression.   You also had some self harm behavior (swallowing a pencil.)  Your symptoms gradually improved with treatment.    While in the hospital, an updated CADI assessment was done on 12-14-18 with Shaniqua Anderson of [facility].    Your original commitment:  7-10-18 as MI to [facility] and Commissioner of Human Services.   Aranda order for:  Clozaril, Zyprexa, Abilify, and Latuda.        On 1-2-19, there was a hearing to amend the original Aranda order and to extend your commitment.   That hearing resulted in your commitment and Aranda order being extended for 9 months.     A local remote Provisional Discharge was coordinated with [facility].    While in the hospital, a new placement had to be found.    You are being discharged today to [facility].       Main Diagnosis: 1.  Schizoaffective disorder, bipolar type. (F25.0)  2.  Provisional diagnosis of stimulant use disorder, cocaine type.  (F14.10)  3.  Likely cannabis use disorder, moderate to severe. (F12.20)    Housing Placement information:  [facility]  [address] [place] [address]  Phone: [phone]    Mental Health Follow-Up:   [facility]  Dr Angela Muller - Next appointment:  Tuesday January 22nd @ 2:00 PM  [address] [place] [address], [address], [facility]  Phone:  [phone]    FAX:   [phone]   [facility] to fax discharge summary.      Your [facility]  is Amelia at [phone].    Amelia is covering for Anival Benoit, who is on maternity leave.  The [facility] supervisor is Radha Chinchilla at [phone].  Attend all scheduled appointments with your outpatient providers. Call at  "least 24 hours in advance if you need to reschedule an appointment to ensure continued access to your outpatient providers.   Major Treatments, Procedures and Findings:  You were provided with: a psychiatric assessment, assessed for medical stability, medication evaluation and/or management and group therapy    Symptoms to Report: feeling more aggressive, increased confusion, losing more sleep, mood getting worse or thoughts of suicide    Early warning signs can include: increased depression or anxiety sleep disturbances increased thoughts or behaviors of suicide or self-harm  increased unusual thinking, such as paranoia or hearing voices    Safety and Wellness:  Take all medicines as directed.  Make no changes unless your doctor suggests them.      Follow treatment recommendations.  Refrain from alcohol and non-prescribed drugs.  If there is a concern for safety, call 911.    Resources:   Crisis Intervention: 695.484.3340 or 565-224-6444 (TTY: 499.733.2998).  Call anytime for help.  National [place] on Mental Illness (www.mn.jose.org): 635.938.3257 or 605-680-2738.  Suicide Awareness Voices of Education (SAVE) (www.save.org): 433-081-CWPL (9240)  National Suicide Prevention Line (www.mentalhealthmn.org): 098-612-OMKC (1252)  Mental Health Consumer/Survivor Network of [place] (www.mhcsn.net): 719.686.3471 or 670-554-2989  Mental Health Association of [place] (www.mentalhealth.org): 413.450.2017 or 649-844-8502  Self- Management and Recovery Training., BOND-- Toll free: 802.942.2543  www.[employer]  [facility] Crisis (COPE) Response - Adult 523 456-1097  Text 4 Life: txt \"LIFE\" to 65133 for immediate support and crisis intervention  Crisis text line: Text \"[place]\" to 638780. Free, confidential, 24/7.  Crisis Intervention: 264.835.9575 or 165-890-4516. Call anytime for help.   [facility] Crisis Team - Child: [phone]    The treatment team has appreciated the opportunity to work with you. " If you have any questions or concerns our unit number is [phone].

## 2019-01-08 NOTE — PROGRESS NOTES
Pt was isolative and withdrawn this evening shift, but pleasant on approach. Pt denies any mental health symptoms including SI, SIB, HI, AH, or VH. Pt seems to be thinking much clearer, was in a pleasant mood, laughing and joking with writer during check-in. Pt ate a good dinner and had snack this evening. Pt denies any pain.

## 2019-01-09 PROCEDURE — 99232 SBSQ HOSP IP/OBS MODERATE 35: CPT | Performed by: PSYCHIATRY & NEUROLOGY

## 2019-01-09 PROCEDURE — 25000132 ZZH RX MED GY IP 250 OP 250 PS 637: Performed by: PSYCHIATRY & NEUROLOGY

## 2019-01-09 RX ADMIN — HALOPERIDOL 5 MG: 5 TABLET ORAL at 08:42

## 2019-01-09 RX ADMIN — BENZTROPINE MESYLATE 0.5 MG: 0.5 TABLET ORAL at 08:42

## 2019-01-09 NOTE — PROGRESS NOTES
Patient is discharged to [facility]. She is being sent with all belongings.  Medications are being sent via  due patient's past history of suicide attempts.  Copies of all info are also being sent with meds via .Patient currently denies any suicidal ideation and states she feels ready to leave.

## 2019-01-09 NOTE — PLAN OF CARE
Pt presenting as calm and cooperative.  She remains overall isolated and withdrawn to her room, but is pleasant and social when approached.  She reports feeling ready and excited to discharge. She denies SI/SIB/HI or any psychotic symptoms.  She was medication compliant requesting PRN Zyprexa to help her calm down and sleep better.

## 2019-01-09 NOTE — PROGRESS NOTES
[facility], [place]   Psychiatric Progress Note  Ilene Morgan Aided  [id number]  01/09/19    Chief Complaint: Continued medical care          Interim History:   The patient's care was discussed with the treatment team during the daily team meeting and/or staff's chart notes were reviewed.  Staff report patient has been doing well primarily staying to herself in her room.    Upon visiting with her she says that she is feeling good she is ready to discharge and she is all dressed and ready to be discharged from the hospital.  I was not sure I would be able to see her did today but was able to briefly meet with her.  She denies any thoughts of harming herself or others any paranoia attention or concentration issues hopelessness or helplessness anhedonia or any anxiety symptoms.  She is future oriented hoping to get out of the hospital and do well in the community.  Denies any guilty feelings grandiose ideas or sleeping problems.  Denies any side effects related to her current medications and we discussed safety planning again.         Medications:       benztropine  0.5 mg Oral BID     haloperidol  5 mg Oral BID    Or     haloperidol lactate  5 mg Intramuscular BID          Allergies:   No Known Allergies       Labs:   No results found for this or any previous visit (from the past 24 hour(s)).       Psychiatric Examination:     /75   Pulse 92   Temp 97.2  F (36.2  C) (Tympanic)   Resp 16   Wt 92.3 kg (203 lb 8 oz)   LMP 10/31/2018 (Approximate)   SpO2 99%   BMI 32.85 kg/m    Weight is 203 lbs 8 oz  Body mass index is 32.85 kg/m .                Sitting Orthostatic BP: 119/66      Sitting Orthostatic Pulse: 84 bpm      Standing Orthostatic BP: 115/64      Standing Orthostatic Pulse: 101 bpm       Weight over time:  Vitals:    12/08/18 0900   Weight: 92.3 kg (203 lb 8 oz)       Orthostatic Vitals     None            Ilene is a 28-year-old female with short black hair and is  overweight.  Her speech is of an appropriate rate and tone and her language is intact.  Her behavior is pacing at times.  Her affect is calm.  Her mood she describes as good.  Her thoughts consists of the above without thoughts of harming herself or others or delusional content.  Thought process is less ruminative and is concrete without looseness of association.  She no longer has abnormal perceptions.  She is alert and aware of her current location and circumstances.  Her attention and concentration appear limited.  Her cognition and fund of knowledge appear below average.  Her long-term/short-term/remote memory appear limited.  Her insight and judgment are both impaired.         Precautions:     Behavioral Orders   Procedures     Assault precautions     Code 1 - Restrict to Unit     Elopement precautions     Fall precautions     Routine Programming     As clinically indicated     Status 15     Every 15 minutes.     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  She has not had any inappropriate behaviors and appears at baseline of function currently.  At this point in time we will be discharging her today and her discharge documentation and evaluation has already been completed.    Currently committed with Aranda of haloperidol, risperidone, chlorpromazine, clozapine currently discharging from the hospital      The risks, benefits, alternatives and side effects have been discussed and are understood  by the patient and other caregivers.      Nathan Vincent  [facility]iat[facility]      The following document has been created with voice recognition software and may contain unintentional word substitutions.    Non clinically relevant CMS requirements:  Clinical Global Impressions  First:  Considering your total clinical experience with this particular patient population, how severe are the patient's symptoms at this time?: 7 (11/28/18 1001)  Compared to the patient's condition at the START of treatment, this patient's condition is:: 4 (11/28/18 1001)  Most recent:  Considering your total clinical experience with this particular patient population, how severe are the patient's symptoms at this time?: 1 (01/09/19 0646)  Compared to the patient's condition at the START of treatment, this patient's condition is:: 1 (01/09/19 0646)

## 2019-03-01 ENCOUNTER — HOSPITAL ENCOUNTER (EMERGENCY)
Facility: CLINIC | Age: 29
Discharge: HOME | End: 2019-03-02
Attending: EMERGENCY MEDICINE | Admitting: EMERGENCY MEDICINE
Payer: COMMERCIAL

## 2019-03-01 DIAGNOSIS — R06.2 WHEEZING: ICD-10-CM

## 2019-03-01 DIAGNOSIS — B34.9 VIRAL ILLNESS: ICD-10-CM

## 2019-03-01 DIAGNOSIS — R07.89 ATYPICAL CHEST PAIN: ICD-10-CM

## 2019-03-01 PROCEDURE — 93005 ELECTROCARDIOGRAM TRACING: CPT

## 2019-03-01 PROCEDURE — 99285 EMERGENCY DEPT VISIT HI MDM: CPT | Mod: 25

## 2019-03-01 RX ORDER — ONDANSETRON 2 MG/ML
4 INJECTION INTRAMUSCULAR; INTRAVENOUS EVERY 30 MIN PRN
Status: DISCONTINUED | OUTPATIENT
Start: 2019-03-01 | End: 2019-03-02 | Stop reason: HOSPADM

## 2019-03-01 RX ORDER — LIDOCAINE 40 MG/G
CREAM TOPICAL
Status: DISCONTINUED | OUTPATIENT
Start: 2019-03-01 | End: 2019-03-02 | Stop reason: HOSPADM

## 2019-03-01 RX ORDER — IPRATROPIUM BROMIDE AND ALBUTEROL SULFATE 2.5; .5 MG/3ML; MG/3ML
3 SOLUTION RESPIRATORY (INHALATION) ONCE
Status: COMPLETED | OUTPATIENT
Start: 2019-03-01 | End: 2019-03-02

## 2019-03-01 RX ORDER — IBUPROFEN 600 MG/1
600 TABLET, FILM COATED ORAL ONCE
Status: COMPLETED | OUTPATIENT
Start: 2019-03-01 | End: 2019-03-02

## 2019-03-01 RX ORDER — SODIUM CHLORIDE 9 MG/ML
1000 INJECTION, SOLUTION INTRAVENOUS CONTINUOUS
Status: DISCONTINUED | OUTPATIENT
Start: 2019-03-01 | End: 2019-03-02 | Stop reason: HOSPADM

## 2019-03-01 NOTE — ED AVS SNAPSHOT
[facility] Emergency Department  [address]  [facility] [address]  Phone:  [phone]  Fax:  [phone]                                    Ilene Liu   MRN: [id number]    Department:  [facility] Emergency Department   Date of Visit:  3/1/2019           After Visit Summary Signature Page    I have received my discharge instructions, and my questions have been answered. I have discussed any challenges I see with this plan with the nurse or doctor.    ..........................................................................................................................................  Patient/Patient Representative Signature      ..........................................................................................................................................  Patient Representative Print Name and Relationship to Patient    ..................................................               ................................................  Date                                   Time    ..........................................................................................................................................  Reviewed by Signature/Title    ...................................................              ..............................................  Date                                               Time          22EPIC Rev 08/18

## 2019-03-02 ENCOUNTER — APPOINTMENT (OUTPATIENT)
Dept: GENERAL RADIOLOGY | Facility: CLINIC | Age: 29
End: 2019-03-02
Attending: EMERGENCY MEDICINE
Payer: COMMERCIAL

## 2019-03-02 VITALS
DIASTOLIC BLOOD PRESSURE: 93 MMHG | BODY MASS INDEX: 32.28 KG/M2 | SYSTOLIC BLOOD PRESSURE: 144 MMHG | OXYGEN SATURATION: 99 % | HEART RATE: 102 BPM | WEIGHT: 200 LBS | TEMPERATURE: 98.6 F | RESPIRATION RATE: 28 BRPM

## 2019-03-02 LAB
ALBUMIN SERPL-MCNC: 3.4 G/DL (ref 3.4–5)
ALP SERPL-CCNC: 55 U/L (ref 40–150)
ALT SERPL W P-5'-P-CCNC: 24 U/L (ref 0–50)
ANION GAP SERPL CALCULATED.3IONS-SCNC: 6 MMOL/L (ref 3–14)
AST SERPL W P-5'-P-CCNC: 17 U/L (ref 0–45)
BASOPHILS # BLD AUTO: 0 10E9/L (ref 0–0.2)
BASOPHILS NFR BLD AUTO: 0.5 %
BILIRUB SERPL-MCNC: 0.5 MG/DL (ref 0.2–1.3)
BUN SERPL-MCNC: 10 MG/DL (ref 7–30)
CALCIUM SERPL-MCNC: 8.2 MG/DL (ref 8.5–10.1)
CHLORIDE SERPL-SCNC: 105 MMOL/L (ref 94–109)
CO2 SERPL-SCNC: 23 MMOL/L (ref 20–32)
CREAT SERPL-MCNC: 0.43 MG/DL (ref 0.52–1.04)
DIFFERENTIAL METHOD BLD: NORMAL
EOSINOPHIL # BLD AUTO: 0.3 10E9/L (ref 0–0.7)
EOSINOPHIL NFR BLD AUTO: 5.6 %
ERYTHROCYTE [DISTWIDTH] IN BLOOD BY AUTOMATED COUNT: 13.2 % (ref 10–15)
GFR SERPL CREATININE-BSD FRML MDRD: >90 ML/MIN/{1.73_M2}
GLUCOSE SERPL-MCNC: 97 MG/DL (ref 70–99)
HCG SERPL QL: NEGATIVE
HCT VFR BLD AUTO: 38.6 % (ref 35–47)
HGB BLD-MCNC: 13.6 G/DL (ref 11.7–15.7)
IMM GRANULOCYTES # BLD: 0 10E9/L (ref 0–0.4)
IMM GRANULOCYTES NFR BLD: 0.2 %
INTERPRETATION ECG - MUSE: NORMAL
LYMPHOCYTES # BLD AUTO: 2.2 10E9/L (ref 0.8–5.3)
LYMPHOCYTES NFR BLD AUTO: 38.8 %
MAGNESIUM SERPL-MCNC: 1.9 MG/DL (ref 1.6–2.3)
MCH RBC QN AUTO: 31.9 PG (ref 26.5–33)
MCHC RBC AUTO-ENTMCNC: 35.2 G/DL (ref 31.5–36.5)
MCV RBC AUTO: 90 FL (ref 78–100)
MONOCYTES # BLD AUTO: 0.5 10E9/L (ref 0–1.3)
MONOCYTES NFR BLD AUTO: 8.4 %
NEUTROPHILS # BLD AUTO: 2.6 10E9/L (ref 1.6–8.3)
NEUTROPHILS NFR BLD AUTO: 46.5 %
NRBC # BLD AUTO: 0 10*3/UL
NRBC BLD AUTO-RTO: 0 /100
PLATELET # BLD AUTO: 218 10E9/L (ref 150–450)
POTASSIUM SERPL-SCNC: 3.5 MMOL/L (ref 3.4–5.3)
PROT SERPL-MCNC: 7.2 G/DL (ref 6.8–8.8)
RBC # BLD AUTO: 4.27 10E12/L (ref 3.8–5.2)
SODIUM SERPL-SCNC: 134 MMOL/L (ref 133–144)
TROPONIN I SERPL-MCNC: <0.015 UG/L (ref 0–0.04)
TSH SERPL DL<=0.005 MIU/L-ACNC: 0.95 MU/L (ref 0.4–4)
WBC # BLD AUTO: 5.6 10E9/L (ref 4–11)

## 2019-03-02 PROCEDURE — 96361 HYDRATE IV INFUSION ADD-ON: CPT

## 2019-03-02 PROCEDURE — 84484 ASSAY OF TROPONIN QUANT: CPT | Performed by: EMERGENCY MEDICINE

## 2019-03-02 PROCEDURE — 25000125 ZZHC RX 250: Performed by: EMERGENCY MEDICINE

## 2019-03-02 PROCEDURE — 25000132 ZZH RX MED GY IP 250 OP 250 PS 637: Performed by: EMERGENCY MEDICINE

## 2019-03-02 PROCEDURE — 94640 AIRWAY INHALATION TREATMENT: CPT

## 2019-03-02 PROCEDURE — 84443 ASSAY THYROID STIM HORMONE: CPT | Performed by: EMERGENCY MEDICINE

## 2019-03-02 PROCEDURE — 96360 HYDRATION IV INFUSION INIT: CPT

## 2019-03-02 PROCEDURE — 83735 ASSAY OF MAGNESIUM: CPT | Performed by: EMERGENCY MEDICINE

## 2019-03-02 PROCEDURE — 84703 CHORIONIC GONADOTROPIN ASSAY: CPT | Performed by: EMERGENCY MEDICINE

## 2019-03-02 PROCEDURE — 25000128 H RX IP 250 OP 636: Performed by: EMERGENCY MEDICINE

## 2019-03-02 PROCEDURE — 71046 X-RAY EXAM CHEST 2 VIEWS: CPT

## 2019-03-02 PROCEDURE — 80053 COMPREHEN METABOLIC PANEL: CPT | Performed by: EMERGENCY MEDICINE

## 2019-03-02 PROCEDURE — 85025 COMPLETE CBC W/AUTO DIFF WBC: CPT | Performed by: EMERGENCY MEDICINE

## 2019-03-02 RX ORDER — ALBUTEROL SULFATE 90 UG/1
2 AEROSOL, METERED RESPIRATORY (INHALATION) EVERY 4 HOURS PRN
Qty: 1 INHALER | Refills: 0 | Status: SHIPPED | OUTPATIENT
Start: 2019-03-02 | End: 2019-05-04

## 2019-03-02 RX ADMIN — IBUPROFEN 600 MG: 600 TABLET ORAL at 00:14

## 2019-03-02 RX ADMIN — SODIUM CHLORIDE 1000 ML: 9 INJECTION, SOLUTION INTRAVENOUS at 00:08

## 2019-03-02 RX ADMIN — IPRATROPIUM BROMIDE AND ALBUTEROL SULFATE 3 ML: .5; 3 SOLUTION RESPIRATORY (INHALATION) at 00:14

## 2019-03-02 ASSESSMENT — ENCOUNTER SYMPTOMS
VOMITING: 1
PALPITATIONS: 1
DIZZINESS: 1
DIARRHEA: 0
LIGHT-HEADEDNESS: 1
COUGH: 1
FEVER: 0
APPETITE CHANGE: 1

## 2019-03-02 NOTE — ED TRIAGE NOTES
Coughing for weeks, palpitations started an hour ago. She reports she has been for 4 hours. She has also felt dizzy and shaky. Sore throat.

## 2019-03-02 NOTE — ED PROVIDER NOTES
"  History     Chief Complaint:  Palpitations; Dizziness; and Cough      HPI   Ilene Liu is a 29 year old female with a past medical history significant for schizophrenia on Haldol who presents for evaluation of dizziness, palpitations and cough. Patient reports that she began experiencing symptoms of cough about 2 weeks ago but in the last 3 days her cough has become constant and she is unable to keep food down because it is causing her to vomit. She also reports feeling lightheaded but denies fevers, diarrhea or recent travel. Of note, the patient's children were diagnosed and treated for Influenza. Patient has taken all of her medications today.       Allergies:  No known drug allergies     Medications:    albuterol (PROAIR HFA/PROVENTIL HFA/VENTOLIN HFA) 108 (90 Base) MCG/ACT inhaler  benztropine (COGENTIN) 0.5 MG tablet  haloperidol (HALDOL) 5 MG tablet  OLANZapine (ZYPREXA) 10 MG tablet      Past Medical History:    Schizo-affective type schizophrenia, in remission    Past Surgical History:    History reviewed. No pertinent surgical history.    Family History:    History reviewed. No pertinent family history.     Social History:  Smoking status: Current every day smoker  Alcohol use: Yes  Drug use: Marijuana and \"crack\" cocaine   Marital Status:  Single [1]       Review of Systems   Constitutional: Positive for appetite change. Negative for fever.   Respiratory: Positive for cough.    Cardiovascular: Positive for palpitations.   Gastrointestinal: Positive for vomiting. Negative for diarrhea.   Neurological: Positive for dizziness and light-headedness.   All other systems reviewed and are negative.        Physical Exam     Patient Vitals for the past 24 hrs:   BP Temp Temp src Pulse Heart Rate Resp SpO2 Weight   03/02/19 0210 -- 98.6  F (37  C) Oral -- -- -- -- --   03/02/19 0130 (!) 144/93 -- -- 102 108 -- 99 % --   03/02/19 0045 -- -- -- -- 105 28 98 % --   03/02/19 0030 147/88 -- -- 108 104 -- 97 % " --   03/02/19 0015 133/83 -- -- 104 100 19 96 % --   03/02/19 0000 -- -- -- -- 97 22 98 % --   03/01/19 2308 (!) 163/119 99.9  F (37.7  C) Temporal 104 104 16 99 % 90.7 kg (200 lb)         Physical Exam   Constitutional: She appears well-developed and well-nourished.   HENT:   Right Ear: External ear normal.   Left Ear: External ear normal.   Mouth/Throat: Oropharynx is clear and moist. No oropharyngeal exudate.   Eyes: Conjunctivae and EOM are normal. Pupils are equal, round, and reactive to light. No scleral icterus.   Neck: Normal range of motion. Neck supple.   Cardiovascular: Normal rate, regular rhythm, normal heart sounds and intact distal pulses. Exam reveals no gallop and no friction rub.   No murmur heard.  Pulmonary/Chest: Effort normal. No stridor. No respiratory distress. She has wheezes.   expiatory wheezing at bases of lungs    Abdominal: Soft. Bowel sounds are normal. She exhibits no distension. There is no tenderness. There is no rebound and no guarding. No hernia.   Musculoskeletal: Normal range of motion. She exhibits no edema.   Lymphadenopathy:     She has no cervical adenopathy.   Neurological: She is alert.   Skin: Skin is warm and dry. Capillary refill takes less than 2 seconds. No rash noted. No erythema. No pallor.   Psychiatric: She has a normal mood and affect.         Emergency Department Course   ECG (23:19:14):  Rate 102 bpm. ID interval 172. QRS duration 76. QT/QTc 324/422. P-R-T axes 44 39 34. Sinus tachycardia, otherwise normal ECG,  Interpreted at 2325 by Ab Astudillo MD.      Imaging:  Radiographic findings were communicated with the patient who voiced understanding of the findings.    Chest XR  IMPRESSION: No radiographic evidence of acute chest abnormality.  As read by radiology     Laboratory:  TSH 0.95  Magnesium: 1.9  HCG qualitative blood: Negative  Troponin I: <0.015    CMP: Creatinine 0.43, Calcium 8.2  CBC: WNL (WBC 5.6, HGB 13.6, )    Interventions:  0014:  Ibuprofen 600 mg PO   0014: Duoneb 3 mL nebulization       Emergency Department Course:  Past medical records, nursing notes, and vitals reviewed.  2341: I performed an exam of the patient and obtained history, as documented above.    IV inserted and blood drawn.    The patient was sent for a chest XR while in the emergency department, findings above.    0154: I rechecked the patient. The patient is feeling better after DUONEB Findings and plan explained to the Patient. Patient discharged home with instructions regarding supportive care, medications, and reasons to return. The importance of close follow-up was reviewed.       Impression & Plan      Medical Decision Making:  The patient presents with above described symptoms, she has some mild expiratory wheezing on exam which did improve significantly after neb treatment. So far her workup is unremarkable. There is no evidence that this is cardiac chest pain. It is likely viral and has some pleurisy from that. She stated to me that her symptoms resolved after nebulizer treatment so symptoms could also be due to bronchospasm and wheezing. She is advised to fill her inhaler prescription and use every 4-6 hours and to recheck with her doctor on Monday if symptoms worsen. Otherwise I advised her to use Motrin and tylenol as needed and to rest.       Diagnosis:    ICD-10-CM    1. Viral illness B34.9    2. Wheezing R06.2    3. Atypical chest pain R07.89        Disposition:  discharged to home    Discharge Medications:  Albuterol inhaler      Ivan Jaquez  3/1/2019   [facility] EMERGENCY DEPARTMENT    Scribe Disclosure:  Ivan CHAPPELL, am serving as a scribe at 11:41 PM on 3/1/2019 to document services personally performed by Ab Astudillo MD based on my observations and the provider's statements to me.          Ab Astudillo MD  03/02/19 0657

## 2019-03-02 NOTE — PLAN OF CARE
"48 hour nursing assessment completed. Patient continues on SIO for safety and monitoring of swallowing, SIB, and assault risks. Patient presents to this writer with a blunted affect. She is pleasant and cooperative. UA and wet prep sent to lab. Patient states she \"feels fine\", and denies any SI/SIB or urges to harm others. Medication compliant. Continue to monitor and assess.   " Self

## 2019-05-04 ENCOUNTER — HOSPITAL ENCOUNTER (EMERGENCY)
Facility: CLINIC | Age: 29
Discharge: HOME | End: 2019-05-04
Attending: EMERGENCY MEDICINE | Admitting: EMERGENCY MEDICINE
Payer: COMMERCIAL

## 2019-05-04 ENCOUNTER — APPOINTMENT (OUTPATIENT)
Dept: GENERAL RADIOLOGY | Facility: CLINIC | Age: 29
End: 2019-05-04
Attending: EMERGENCY MEDICINE
Payer: COMMERCIAL

## 2019-05-04 VITALS
DIASTOLIC BLOOD PRESSURE: 95 MMHG | SYSTOLIC BLOOD PRESSURE: 136 MMHG | BODY MASS INDEX: 32.28 KG/M2 | RESPIRATION RATE: 18 BRPM | WEIGHT: 200 LBS | OXYGEN SATURATION: 97 % | HEART RATE: 89 BPM | TEMPERATURE: 97.5 F

## 2019-05-04 DIAGNOSIS — J10.1 INFLUENZA A: ICD-10-CM

## 2019-05-04 LAB
ANION GAP SERPL CALCULATED.3IONS-SCNC: 6 MMOL/L (ref 3–14)
BASOPHILS # BLD AUTO: 0 10E9/L (ref 0–0.2)
BASOPHILS NFR BLD AUTO: 0.5 %
BUN SERPL-MCNC: 10 MG/DL (ref 7–30)
CALCIUM SERPL-MCNC: 8.8 MG/DL (ref 8.5–10.1)
CHLORIDE SERPL-SCNC: 107 MMOL/L (ref 94–109)
CO2 SERPL-SCNC: 23 MMOL/L (ref 20–32)
CREAT SERPL-MCNC: 0.55 MG/DL (ref 0.52–1.04)
DIFFERENTIAL METHOD BLD: NORMAL
EOSINOPHIL # BLD AUTO: 0.5 10E9/L (ref 0–0.7)
EOSINOPHIL NFR BLD AUTO: 9.3 %
ERYTHROCYTE [DISTWIDTH] IN BLOOD BY AUTOMATED COUNT: 12.8 % (ref 10–15)
FLUAV+FLUBV AG SPEC QL: NEGATIVE
FLUAV+FLUBV AG SPEC QL: POSITIVE
GFR SERPL CREATININE-BSD FRML MDRD: >90 ML/MIN/{1.73_M2}
GLUCOSE SERPL-MCNC: 116 MG/DL (ref 70–99)
HCT VFR BLD AUTO: 40.4 % (ref 35–47)
HGB BLD-MCNC: 14.1 G/DL (ref 11.7–15.7)
IMM GRANULOCYTES # BLD: 0 10E9/L (ref 0–0.4)
IMM GRANULOCYTES NFR BLD: 0.5 %
LYMPHOCYTES # BLD AUTO: 1.5 10E9/L (ref 0.8–5.3)
LYMPHOCYTES NFR BLD AUTO: 26.7 %
MCH RBC QN AUTO: 31.9 PG (ref 26.5–33)
MCHC RBC AUTO-ENTMCNC: 34.9 G/DL (ref 31.5–36.5)
MCV RBC AUTO: 91 FL (ref 78–100)
MONOCYTES # BLD AUTO: 0.4 10E9/L (ref 0–1.3)
MONOCYTES NFR BLD AUTO: 6.3 %
NEUTROPHILS # BLD AUTO: 3.2 10E9/L (ref 1.6–8.3)
NEUTROPHILS NFR BLD AUTO: 56.7 %
NRBC # BLD AUTO: 0 10*3/UL
NRBC BLD AUTO-RTO: 0 /100
PLATELET # BLD AUTO: 185 10E9/L (ref 150–450)
POTASSIUM SERPL-SCNC: 4 MMOL/L (ref 3.4–5.3)
RBC # BLD AUTO: 4.42 10E12/L (ref 3.8–5.2)
SODIUM SERPL-SCNC: 136 MMOL/L (ref 133–144)
SPECIMEN SOURCE: ABNORMAL
WBC # BLD AUTO: 5.7 10E9/L (ref 4–11)

## 2019-05-04 PROCEDURE — 94640 AIRWAY INHALATION TREATMENT: CPT

## 2019-05-04 PROCEDURE — 87804 INFLUENZA ASSAY W/OPTIC: CPT | Performed by: EMERGENCY MEDICINE

## 2019-05-04 PROCEDURE — 25000125 ZZHC RX 250: Performed by: EMERGENCY MEDICINE

## 2019-05-04 PROCEDURE — 80048 BASIC METABOLIC PNL TOTAL CA: CPT | Performed by: EMERGENCY MEDICINE

## 2019-05-04 PROCEDURE — 99284 EMERGENCY DEPT VISIT MOD MDM: CPT | Mod: 25

## 2019-05-04 PROCEDURE — 71046 X-RAY EXAM CHEST 2 VIEWS: CPT

## 2019-05-04 PROCEDURE — 85025 COMPLETE CBC W/AUTO DIFF WBC: CPT | Performed by: EMERGENCY MEDICINE

## 2019-05-04 RX ORDER — IPRATROPIUM BROMIDE AND ALBUTEROL SULFATE 2.5; .5 MG/3ML; MG/3ML
3 SOLUTION RESPIRATORY (INHALATION) ONCE
Status: COMPLETED | OUTPATIENT
Start: 2019-05-04 | End: 2019-05-04

## 2019-05-04 RX ORDER — ALBUTEROL SULFATE 90 UG/1
1-2 AEROSOL, METERED RESPIRATORY (INHALATION) EVERY 4 HOURS PRN
Qty: 8.5 G | Refills: 0 | Status: SHIPPED | OUTPATIENT
Start: 2019-05-04 | End: 2019-07-10

## 2019-05-04 RX ORDER — BENZTROPINE MESYLATE 0.5 MG/1
0.5 TABLET ORAL ONCE
Status: DISCONTINUED | OUTPATIENT
Start: 2019-05-04 | End: 2019-05-04 | Stop reason: HOSPADM

## 2019-05-04 RX ADMIN — IPRATROPIUM BROMIDE AND ALBUTEROL SULFATE 3 ML: .5; 3 SOLUTION RESPIRATORY (INHALATION) at 09:41

## 2019-05-04 ASSESSMENT — ENCOUNTER SYMPTOMS
TREMORS: 1
COUGH: 1
SHORTNESS OF BREATH: 1

## 2019-05-04 NOTE — ED PROVIDER NOTES
History     Chief Complaint:  Tremors/Shakes    HPI   Ilene Liu is a 29 year old female with a history of pneumonia who presents with shakiness and a cough. Per chart review the patient came into the emergency department on 3/1 for symptoms consisting of cough and minor chest pain that were resolved with a duoneb and the patient was discharged the same day with a prescription for an inhaler she has not yet picked up. Today the patient states that she has had a productive cough with mucous for over two days and her shakiness started two days ago in her left leg. The patient reports that she was shaking all last night. She says that she is able to slow down the shaking when she focuses on it, but if she doesn't think about it then it starts to spread to the rest of her body. The patient says that she does have some difficulty breathing.The patient denies ever having similar symptoms of shakiness before. The patient does endorse the use of haldol, of which her dose was increased in April.    Allergies:  No Known Drug Allergies    Medications:    Albuterol  Cogentin  Haldol  Zyprexa    Past Medical History:    Schizo-affective type schizophrenia, in remission  Cocaine use disorder  Cannabis use disorder  Cigarette nicotine dependence with withdrawal  Non-compliance  Positive Quantiferon-TB gold test    Family History:    Family history reviewed. No pertinent family history.    Social History:  Smoking Status: Current Smoker  Smokeless Tobacco: Never Used  Alcohol Use: Positive  Drug Use: Positive   Types: marijuana, cocaine  PCP: [facility]  Marital Status:  Single      Review of Systems   Constitutional: Positive for chills. Negative for fever.   Respiratory: Positive for cough and shortness of breath.    Neurological: Positive for tremors.   All other systems reviewed and are negative.        Physical Exam     Patient Vitals for the past 24 hrs:   BP Temp Temp src Pulse Resp SpO2 Weight    05/04/19 1041 (!) 136/95 -- -- 89 18 97 % --   05/04/19 1036 (!) 136/95 -- -- 95 -- 97 % --   05/04/19 0924 -- -- -- -- -- 97 % --   05/04/19 0922 127/90 -- -- 94 -- -- --   05/04/19 0915 (!) 148/100 97.5  F (36.4  C) Temporal 94 18 98 % 90.7 kg (200 lb)         Physical Exam    Nursing note and vitals reviewed.  Constitutional: Cooperative.   HENT:   Mouth/Throat: Moist mucous membranes.   Eyes: EOMI, nonicteric sclera  Cardiovascular: Normal rate, regular rhythm, no murmurs, rubs, or gallops  Pulmonary/Chest: Effort normal and breath sounds normal. No respiratory distress. No wheezes. No rales.   Abdominal: Soft. Nontender, nondistended, no guarding or rigidity. BS present.   Musculoskeletal: Normal range of motion.   Neurological: Alert. Moves all extremities spontaneously. Tremor left leg that pt is able to terminate at will.   Skin: Skin is warm and dry. No rash noted.   Psychiatric: Normal mood and affect.     Emergency Department Course     Imaging:  Radiology findings were communicated with the patient who voiced understanding of the findings.    Chest XR,  PA & LAT  No acute disease.  CLOTILDE HUNT MD  Reading per radiology    Laboratory:  Laboratory findings were communicated with the patient who voiced understanding of the findings.      CBC: WBC 5.7, HGB 14.1,   BMP: Glucose 116 (H) o/w WNL (Creatinine 0.55)  Influenza A/B antigen: Influenza A positive (A), Influenza B negative    Interventions:  0941 Duoneb 3 mL Nebuliation    Emergency Department Course:    0917 Nursing notes and vitals reviewed.    0918 I performed an exam of the patient as documented above.     0939 IV was inserted and blood was drawn for laboratory testing, results above.    1000 The patient was sent for a chest Xray, PA & LAT while in the emergency department, results above.     1016 Patient rechecked and updated.     1042 I personally reviewed the laboratory and imaing results with the patient and answered all related  questions prior to discharge.    Impression & Plan      Medical Decision Making:  This patient presents for evaluation of cough and tremors.  This presentation is consistent with influenza, with tremors likely being rigors related to fever. The patient has a normal oxygen saturation. CXR obtained given pt is vulnerable given her mental illness. Tamiflu is not indicated given pt has already had 48 hours of symptoms.  The patient understands that they are at risk for developing pneumonia. They understand to return to the ED with new or worse symptoms otherwise follow up in clinic if the fever is not improving in 2 days. At this time there is no sign of serious bacterial infection such as bacteremia, meningitis, UTI/pyelonephritis, strep pharyngitis, etc.      Diagnosis:    ICD-10-CM    1. Influenza A J10.1      Disposition:   The patient is discharged to home.    Discharge Medications:     Review of your medicines      CONTINUE these medicines which may have CHANGED, or have new prescriptions. If we are uncertain of the size of tablets/capsules you have at home, strength may be listed as something that might have changed.      Dose / Directions   albuterol 108 (90 Base) MCG/ACT inhaler  Commonly known as:  PROAIR HFA/PROVENTIL HFA/VENTOLIN HFA  This may have changed:  how much to take      Dose:  1-2 puff  Inhale 1-2 puffs into the lungs every 4 hours as needed for shortness of breath / dyspnea or wheezing  Quantity:  8.5 g  Refills:  0           Where to get your medicines      Some of these will need a paper prescription and others can be bought over the counter. Ask your nurse if you have questions.    Bring a paper prescription for each of these medications    albuterol 108 (90 Base) MCG/ACT inhaler         Scribe Disclosure:  I, Giuliano Hendricks, am serving as a scribe at 9:19 AM on 5/4/2019 to document services personally performed by James Solitario MD based on my observations and the provider's  statements to me.    [facility] EMERGENCY DEPARTMENT       James Solitario MD  05/06/19 0557

## 2019-05-04 NOTE — ED AVS SNAPSHOT
[facility] Emergency Department  [address]  [facility] [address]  Phone:  [phone]  Fax:  [phone]                                    Ilene Liu   MRN: [id number]    Department:  [facility] Emergency Department   Date of Visit:  5/4/2019           After Visit Summary Signature Page    I have received my discharge instructions, and my questions have been answered. I have discussed any challenges I see with this plan with the nurse or doctor.    ..........................................................................................................................................  Patient/Patient Representative Signature      ..........................................................................................................................................  Patient Representative Print Name and Relationship to Patient    ..................................................               ................................................  Date                                   Time    ..........................................................................................................................................  Reviewed by Signature/Title    ...................................................              ..............................................  Date                                               Time          22EPIC Rev 08/18

## 2019-05-04 NOTE — DISCHARGE INSTRUCTIONS
Tylenol dosinmg every 6 hours.  Motrin (advil/ibuprofen) dosinmg every 6 hours.       Discharge Instructions  Influenza    You were diagnosed today with influenza or influenza like illness.  Influenza is a respiratory (breathing) illness caused by influenza A or B viruses.  Influenza causes five primary symptoms: fever, headache, muscle aches/fatigue/malaise, sore throat and cough.  These symptoms start one to four days after you have been around a person with this illness. Influenza is spread through sneezing and coughing and can live on surfaces for several days.  It is usually contagious for 5 days but in some cases up to 10 days and often affects several family members. If you have a family member who is less than 2 years old, older than 65 years old, pregnant or has a serious medical condition, they should be seen right away by a provider to decide if they should take preventative medications. Although influenza will make you feel very ill, most patients don?t require any specific treatment. An antiviral medication might be prescribed for certain groups of patients (older patients, younger patients, and those with certain chronic medical problems).    Generally, every Emergency Department visit should have a follow-up clinic visit with either a primary or a specialty clinic/provider. Please follow-up as instructed by your emergency provider today.    Return to the Emergency Department if:  You have trouble breathing.  You develop pain in your chest.  You have signs of being dehydrated, such as dizziness or unable to urinate (pee) at least three times daily.  You are confused or severely weak.  You cannot stop vomiting (throwing up) or you cannot drink enough fluids.    In children, you should seek help if the child has any of the above or if child:  Has blue or purplish skin color.  Is so irritable that he or she does not want to be held.  Does not have tears when crying (in infants) or does not  urinate at least three times daily.  Does not wake up easily.    What can I do to help myself?  Rest.  Fluids -- Drink hydrating solutions such as Gatorade  or Pedialyte  as often as you can. If you are drinking enough, you should pass urine at least every eight hours.  Tylenol  (acetaminophen) and Advil  (ibuprofen) can relieve fever, headache, and muscle aches. Do not give aspirin to children under 18 years old.   Antiviral treatment -- Antiviral medicines do not make the flu symptoms go away immediately.  They have only been shown to make the symptoms go away 12 to 24 hours sooner than they would without treatment.     Antibiotics -- Antibiotics are NOT useful for treating viral illnesses such as influenza. Antibiotics should only be used if there is a bacterial complication of the flu such as bacterial pneumonia, ear infection, or sinusitis.  Because you were diagnosed with a flu like illness you are very contagious.  This means you cannot work, attend school or  for at least 24 hours or until you no longer have a fever.  If you were given a prescription for medicine here today, be sure to read all of the information (including the package insert) that comes with your prescription.  This will include important information about the medicine, its side effects, and any warnings that you need to know about.  The pharmacist who fills the prescription can provide more information and answer questions you may have about the medicine.  If you have questions or concerns that the pharmacist cannot address, please call or return to the Emergency Department.   Remember that you can always come back to the Emergency Department if you are not able to see your regular provider in the amount of time listed above, if you get any new symptoms, or if there is anything that worries you.

## 2019-05-04 NOTE — ED TRIAGE NOTES
Complains of feeling shaky for 2 nights, coughing a lot, and hard time breathing.  Recently started getting haldol injections for schizo effective disorder.  Had injection 1 week ago.  ABCDs intact in triage.

## 2019-05-06 ASSESSMENT — ENCOUNTER SYMPTOMS
CHILLS: 1
FEVER: 0

## 2019-05-15 ENCOUNTER — HOSPITAL ENCOUNTER (INPATIENT)
Facility: CLINIC | Age: 29
LOS: 5 days | Discharge: HOME | End: 2019-05-21
Attending: EMERGENCY MEDICINE | Admitting: PSYCHIATRY & NEUROLOGY
Payer: COMMERCIAL

## 2019-05-15 ENCOUNTER — TELEPHONE (OUTPATIENT)
Dept: BEHAVIORAL HEALTH | Facility: CLINIC | Age: 29
End: 2019-05-15

## 2019-05-15 DIAGNOSIS — T43.592A INTENTIONAL DROPERIDOL OVERDOSE, INITIAL ENCOUNTER (H): ICD-10-CM

## 2019-05-15 DIAGNOSIS — T50.2X2A: ICD-10-CM

## 2019-05-15 DIAGNOSIS — F25.0 SCHIZOAFFECTIVE DISORDER, BIPOLAR TYPE (H): ICD-10-CM

## 2019-05-15 DIAGNOSIS — R00.0 TACHYCARDIA, UNSPECIFIED: ICD-10-CM

## 2019-05-15 DIAGNOSIS — T45.0X2A INTENTIONAL CHLORPHENIRAMINE OVERDOSE, INITIAL ENCOUNTER (H): ICD-10-CM

## 2019-05-15 DIAGNOSIS — T50.902A SUICIDAL OVERDOSE, INITIAL ENCOUNTER (H): ICD-10-CM

## 2019-05-15 LAB
ALBUMIN SERPL-MCNC: 3.5 G/DL (ref 3.4–5)
ALP SERPL-CCNC: 61 U/L (ref 40–150)
ALT SERPL W P-5'-P-CCNC: 16 U/L (ref 0–50)
AMPHETAMINES UR QL SCN: NEGATIVE
ANION GAP SERPL CALCULATED.3IONS-SCNC: 9 MMOL/L (ref 3–14)
APAP SERPL-MCNC: <2 MG/L (ref 10–20)
AST SERPL W P-5'-P-CCNC: 8 U/L (ref 0–45)
BARBITURATES UR QL: NEGATIVE
BASOPHILS # BLD AUTO: 0 10E9/L (ref 0–0.2)
BASOPHILS NFR BLD AUTO: 0.2 %
BENZODIAZ UR QL: NEGATIVE
BILIRUB SERPL-MCNC: 0.4 MG/DL (ref 0.2–1.3)
BUN SERPL-MCNC: 13 MG/DL (ref 7–30)
CALCIUM SERPL-MCNC: 8.7 MG/DL (ref 8.5–10.1)
CANNABINOIDS UR QL SCN: POSITIVE
CHLORIDE SERPL-SCNC: 107 MMOL/L (ref 94–109)
CO2 SERPL-SCNC: 22 MMOL/L (ref 20–32)
COCAINE UR QL: NEGATIVE
CREAT SERPL-MCNC: 0.58 MG/DL (ref 0.52–1.04)
DIFFERENTIAL METHOD BLD: NORMAL
EOSINOPHIL # BLD AUTO: 0.4 10E9/L (ref 0–0.7)
EOSINOPHIL NFR BLD AUTO: 5.9 %
ERYTHROCYTE [DISTWIDTH] IN BLOOD BY AUTOMATED COUNT: 13.3 % (ref 10–15)
ETHANOL UR QL SCN: NEGATIVE
GFR SERPL CREATININE-BSD FRML MDRD: >90 ML/MIN/{1.73_M2}
GLUCOSE SERPL-MCNC: 112 MG/DL (ref 70–99)
HCG SERPL QL: NEGATIVE
HCT VFR BLD AUTO: 40.3 % (ref 35–47)
HGB BLD-MCNC: 14 G/DL (ref 11.7–15.7)
IMM GRANULOCYTES # BLD: 0 10E9/L (ref 0–0.4)
IMM GRANULOCYTES NFR BLD: 0.2 %
INTERPRETATION ECG - MUSE: NORMAL
LYMPHOCYTES # BLD AUTO: 2 10E9/L (ref 0.8–5.3)
LYMPHOCYTES NFR BLD AUTO: 34.3 %
MCH RBC QN AUTO: 32 PG (ref 26.5–33)
MCHC RBC AUTO-ENTMCNC: 34.7 G/DL (ref 31.5–36.5)
MCV RBC AUTO: 92 FL (ref 78–100)
MONOCYTES # BLD AUTO: 0.5 10E9/L (ref 0–1.3)
MONOCYTES NFR BLD AUTO: 9.2 %
NEUTROPHILS # BLD AUTO: 3 10E9/L (ref 1.6–8.3)
NEUTROPHILS NFR BLD AUTO: 50.2 %
NRBC # BLD AUTO: 0 10*3/UL
NRBC BLD AUTO-RTO: 0 /100
OPIATES UR QL SCN: NEGATIVE
PLATELET # BLD AUTO: 185 10E9/L (ref 150–450)
POTASSIUM SERPL-SCNC: 3.6 MMOL/L (ref 3.4–5.3)
PROT SERPL-MCNC: 7.5 G/DL (ref 6.8–8.8)
RBC # BLD AUTO: 4.38 10E12/L (ref 3.8–5.2)
SALICYLATES SERPL-MCNC: <2 MG/DL
SODIUM SERPL-SCNC: 138 MMOL/L (ref 133–144)
WBC # BLD AUTO: 5.9 10E9/L (ref 4–11)

## 2019-05-15 PROCEDURE — 96374 THER/PROPH/DIAG INJ IV PUSH: CPT | Performed by: EMERGENCY MEDICINE

## 2019-05-15 PROCEDURE — 99291 CRITICAL CARE FIRST HOUR: CPT | Mod: 25 | Performed by: EMERGENCY MEDICINE

## 2019-05-15 PROCEDURE — 85025 COMPLETE CBC W/AUTO DIFF WBC: CPT | Performed by: EMERGENCY MEDICINE

## 2019-05-15 PROCEDURE — 99285 EMERGENCY DEPT VISIT HI MDM: CPT | Mod: 25 | Performed by: EMERGENCY MEDICINE

## 2019-05-15 PROCEDURE — 93010 ELECTROCARDIOGRAM REPORT: CPT | Mod: Z6 | Performed by: EMERGENCY MEDICINE

## 2019-05-15 PROCEDURE — 80329 ANALGESICS NON-OPIOID 1 OR 2: CPT | Performed by: EMERGENCY MEDICINE

## 2019-05-15 PROCEDURE — 25000128 H RX IP 250 OP 636: Performed by: EMERGENCY MEDICINE

## 2019-05-15 PROCEDURE — 80307 DRUG TEST PRSMV CHEM ANLYZR: CPT | Performed by: EMERGENCY MEDICINE

## 2019-05-15 PROCEDURE — 93005 ELECTROCARDIOGRAM TRACING: CPT | Performed by: EMERGENCY MEDICINE

## 2019-05-15 PROCEDURE — 80320 DRUG SCREEN QUANTALCOHOLS: CPT | Performed by: EMERGENCY MEDICINE

## 2019-05-15 PROCEDURE — 84703 CHORIONIC GONADOTROPIN ASSAY: CPT | Performed by: EMERGENCY MEDICINE

## 2019-05-15 PROCEDURE — 80053 COMPREHEN METABOLIC PANEL: CPT | Performed by: EMERGENCY MEDICINE

## 2019-05-15 RX ORDER — HALOPERIDOL 5 MG/ML
5 INJECTION INTRAMUSCULAR ONCE
Status: DISCONTINUED | OUTPATIENT
Start: 2019-05-15 | End: 2019-05-21 | Stop reason: HOSPADM

## 2019-05-15 RX ORDER — LORAZEPAM 2 MG/ML
2 INJECTION INTRAMUSCULAR ONCE
Status: DISCONTINUED | OUTPATIENT
Start: 2019-05-15 | End: 2019-05-21 | Stop reason: HOSPADM

## 2019-05-15 RX ORDER — HALOPERIDOL DECANOATE 100 MG/ML
150 INJECTION INTRAMUSCULAR
Status: ON HOLD | COMMUNITY
Start: 2019-05-21 | End: 2019-05-20

## 2019-05-15 RX ORDER — SENNOSIDES A AND B 8.6 MG/1
8.6 TABLET, FILM COATED ORAL
Status: ON HOLD | COMMUNITY
Start: 2019-04-23 | End: 2019-05-20

## 2019-05-15 RX ORDER — LORAZEPAM 2 MG/ML
1 INJECTION INTRAMUSCULAR ONCE
Status: COMPLETED | OUTPATIENT
Start: 2019-05-15 | End: 2019-05-15

## 2019-05-15 RX ADMIN — LORAZEPAM 1 MG: 2 INJECTION, SOLUTION INTRAMUSCULAR; INTRAVENOUS at 03:44

## 2019-05-15 NOTE — ED NOTES
While writer was in another room, patient came out of room and tried to exit the ED. Patient was asked by security please return to her room. Patient became belligerent and began to posture at security and physically and verbally threaten staff and security. Security had to physically escort patient back to room,pt spat in the face of security, began yelling and being verbally abusive and threatening. Staff was able to get patient back to room, and at the time of writing is laying in bed.

## 2019-05-15 NOTE — TELEPHONE ENCOUNTER
S: [place] ED MD called at 0535 to place a 28 y/o female for inpatient mental health treatment.    B: Pt was BIB EMS for evaluation of ingestion. Pt states she took approx 10-15 tabs of 10 mg olanzapine, also 2-3 tabs 25 mg diphenhydramine, and approx 50 tabs of 0.5 mg Cogentin around 0105. Pt states this was a suicide attempt. Pt has prior history of suicide attempts by overdose and hanging. Poison control was called and they recommended 8 hours of monitoring. Pt has been monitored for approximately 4 hours at this point. ED MD reports that so far pt's vitals have been stable, EKG is good, and labs have been unremarkable. Medical clearance pending.    A: Emergency admission hold.     R: Placement pending medical clearance and bed availability. Placed on wait list.

## 2019-05-15 NOTE — ED NOTES
Patient came out of of room again screaming, and again spit on security. Patient placed in seclusion at this time.

## 2019-05-15 NOTE — ED TRIAGE NOTES
Pt. states took approx  15 tabs of olanzapine,  also 2 tabs benadryl,  and approx 50 tabs of cogentin around 0105.  Pt. states was a suicide attempt,  but then denied to doctor.  Pt. is alert and oriented x 3.

## 2019-05-15 NOTE — ED NOTES
ED to Behavioral Floor Handoff    SITUATION  Ilene Liu is a 29 year old female who speaks English and lives in a home with family members The patient arrived in the ED by ambulance from home with a complaint of Drug Overdose  .The patient's current symptoms started/worsened 1 day(s) ago and during this time the symptoms have increased.   In the ED, pt was diagnosed with   Final diagnoses:   Suicidal overdose, initial encounter (H)        Initial vitals were: BP: 129/82  Pulse: 93  Heart Rate: 102  Temp: 98.7  F (37.1  C)  Resp: 18  Weight: 90.7 kg (200 lb)  SpO2: 99 %   --------  Is the patient diabetic? No   If yes, last blood glucose? --     If yes, was this treated in the ED? --  --------  Is the patient inebriated (ETOH) No or Impaired on other substances? No  MSSA done? N/A  Last MSSA score: --    Were withdrawal symptoms treated? N/A  Does the patient have a seizure history? No. If yes, date of most recent seizure--  --------  Is the patient patient experiencing suicidal ideation? reports occasional suicidal thoughts representing feeling that life is not worth feeling    Homicidal ideation? denies current or recent homicidal ideation or behaviors.    Self-injurious behavior/urges? reports current or recent self injurious behavior or ideation including overdose.  ------  Was pt aggressive in the ED No  Was a code called No  Is the pt now cooperative? Yes  -------  Meds given in ED:   Medications   LORazepam (ATIVAN) injection 1 mg (1 mg Intravenous Given 5/15/19 0344)      Family present during ED course? No  Family currently present? No    BACKGROUND  Does the patient have a cognitive impairment or developmental disability? No  Allergies: No Known Allergies.   Social demographics are   Social History     Socioeconomic History     Marital status: Single     Spouse name: None     Number of children: None     Years of education: None     Highest education level: None   Occupational History     None  "  Social Needs     Financial resource strain: None     Food insecurity:     Worry: None     Inability: None     Transportation needs:     Medical: None     Non-medical: None   Tobacco Use     Smoking status: Current Every Day Smoker     Smokeless tobacco: Never Used   Substance and Sexual Activity     Alcohol use: Yes     Drug use: Yes     Types: Marijuana, \"Crack\" cocaine     Sexual activity: None   Lifestyle     Physical activity:     Days per week: None     Minutes per session: None     Stress: None   Relationships     Social connections:     Talks on phone: None     Gets together: None     Attends Rastafari service: None     Active member of club or organization: None     Attends meetings of clubs or organizations: None     Relationship status: None     Intimate partner violence:     Fear of current or ex partner: None     Emotionally abused: None     Physically abused: None     Forced sexual activity: None   Other Topics Concern     None   Social History Narrative     None        ASSESSMENT  Labs results   Labs Ordered and Resulted from Time of ED Arrival Up to the Time of Departure from the ED   COMPREHENSIVE METABOLIC PANEL - Abnormal; Notable for the following components:       Result Value    Glucose 112 (*)     All other components within normal limits   DRUG ABUSE SCREEN 6 CHEM DEP URINE ([facility]) - Abnormal; Notable for the following components:    Cannabinoids Qual Urine Positive (*)     All other components within normal limits   CBC WITH PLATELETS DIFFERENTIAL   ACETAMINOPHEN LEVEL   SALICYLATE LEVEL   HCG QUALITATIVE   PERIPHERAL IV CATHETER      Imaging Studies: No results found for this or any previous visit (from the past 24 hour(s)).   Most recent vital signs BP (!) 137/104   Pulse 97   Temp 98.7  F (37.1  C) (Oral)   Resp 19   Wt 90.7 kg (200 lb)   LMP 04/24/2019   SpO2 100%   BMI 32.28 kg/m     Abnormal labs/tests/findings requiring intervention:---   Pain control: pt had none  Nausea " control: pt had none    RECOMMENDATION  Are any infection precautions needed (MRSA, VRE, etc.)? No If yes, what infection? --  ---  Does the patient have mobility issues? independently. If yes, what device does the pt use? ---  ---  Is patient on 72 hour hold or commitment? Yes If on 72 hour hold, have hold and rights been given to patient? Yes  Are admitting orders written if after 10 p.m. ?N/A  Tasks needing to be completed:---     Shira Mullen    [phone] [facility]   [phone] [facility]

## 2019-05-15 NOTE — ED NOTES
"Pt yelling in room, saying \"fuck all of you\". Dr. Johnson at bedside. Security on standby. Pt refusing to go to [facility]. Dr. Johnson trying to find placement for patient.  "

## 2019-05-15 NOTE — ED PROVIDER NOTES
I have performed an in person assessment of the patient. The patient is now medically cleared and off the monitor. She is eating breakfast.  Based on this assessment the patient no longer requires a one on one attendant at this point in time.    Abbi Campoverde MD  9:18 AM  May 15, 2019           Abbi Campoverde MD  05/15/19 0918

## 2019-05-15 NOTE — TELEPHONE ENCOUNTER
R: pt is now med cleared per epic notes from Dr Campoverde. jimmy  Author spoke with ED Rn who said pt slept overnight and has been coop. Author asked him to ask pt if she is willing to go outside the  and he said she is. jimmy  Author called Shannon at [facility] who said they can review pt's case. Author informed Gomez in ED and gave her their fax # of [phone] and requested they fax clinical info to them . She agreed to do this. Shannon said said will call author back with the outcome. jimmy Ortega called saying be is no longer available so author informed Gomez in er. jimmy cabrera at [facility] said pt needs medical monitoring for 24 hours from time she arrived before she could be reviewed. jimmy  Author discussed w/ [facility]. #5S/[facility] accepted for herself pending no hx of physical aggresion; author looked at discharge summary from 2018 stating pt has hx of intellectual disability; therefore, she is not appropriate for 5S; pt to wait in er until a bed is available. jimmy  Author called Maryam at [facility] and she said they are at [facility]. jimmy  Author left msg with Gomez at [place] asking if a bed is available. She said she will have Alessandra call back. jimmy  [facility], [facility] and [facility] will not accept patients on holds. jimmy Johnson called [facility] in [place] and spoke with Bay who said to fax clinical to [phone]. Author informed Gomez in er. jimmy Apaircio called from [facility] in [place] saying she is not sure if they can review pt yet but if pt is not accepted at [facility] we can call her back to check availability at that time. jimmy  Per Dr Johnson, [facility] accepted the pt. jimmy  Per Dr Johnson, pt refuses to go to [facility]. He said he is aware pt is on a hold and does not want to push pt to go out of state. He requested we pursue a possible bed that he just learned is now open on 20 or 22. jimmy  Per Kelly in intake, the bed that is open is for a  different pt and is not available. Author informed Alex of this. Passed case to ectro staff to follow up on. jimmy

## 2019-05-15 NOTE — ED PROVIDER NOTES
"Sign out note: Ilene Morgan Aided is a 29 year old female was signed out to me by Dr. Major at 0700 am.  Please see initial dictation for full details and history.  Patient is status post an overdose of olanzapine, diphenhydramine and Cogentin.  She has schizoaffective disorder..  Plan at time of sign out is admit the patient to an inpatient psychiatric bed.  She is on a 72-hour hold..       Course in the ED:  I reevaluated the patient in the morning.  She states did she felt \"funny and out of her body.\".  She was relatively calm all day until about 1330 pm, when she became abusive and combative with swearing.  She called white people \"crackers\" and was generally abusive.  Given that she had Zyprexa overdose, I was reluctant to treat her with Zyprexa.  We spoke with her and she calm down.    The patient again became agitated, swearing, spitting, and yelling at approximately 1400 pm.  She was placed in seclusion in room 14.  I treated her with Haldol and Ativan IM.  She was later moved to room 15.    She has had an intentional overdose of olanzapine, diphenhydramine, and Cogentin.  Since being medically cleared, she has been very combative and unpleasant, yelling and spitting and swearing.  She is on a 72-hour hold for admission.  At one point, we believed we had a bed for her in [facility].  However the patient refused to go to [place].  She was signed out to Dr. Cruz, the evening physician, awaiting inpatient bed placement      Abbi Campoverde MD   This note was created in part by the use of Dragon voice recognition dictation system. Inadvertent grammatical errors and typographical errors may still exist.  MD Gilles Serrato Alda L, MD  05/15/19 1049    "

## 2019-05-15 NOTE — ED NOTES
Pt was let out of seclusion to use the restroom. Pt is currently calm and cooperative and is agreeing to continue this calm behavior. Pt was not put back into seclusion.

## 2019-05-15 NOTE — ED NOTES
Bed: HW01  Expected date:   Expected time:   Means of arrival:   Comments:  H411 29 F SI attempt. 60 of cogentin

## 2019-05-15 NOTE — ED PROVIDER NOTES
Within an hour after restraint an in person face to face assessment was completed at 1400 pm, including an evaluation of the patient's immediate reaction to the intervention, behavioral assessment and review/assessment of history, drugs and medications, recent labs, etc., and behavioral condition.  The patient experienced: No adverse physical outcome from seclusion/restraint initiation.  The intervention of restraint or seclusion needs to continue.  MD Gilles Alvarez Alda L, MD  05/15/19 2296

## 2019-05-16 ENCOUNTER — TELEPHONE (OUTPATIENT)
Dept: BEHAVIORAL HEALTH | Facility: CLINIC | Age: 29
End: 2019-05-16

## 2019-05-16 PROBLEM — T14.91XA SUICIDE ATTEMPT (H): Status: ACTIVE | Noted: 2019-05-16

## 2019-05-16 PROCEDURE — 25000132 ZZH RX MED GY IP 250 OP 250 PS 637: Performed by: PSYCHIATRY & NEUROLOGY

## 2019-05-16 PROCEDURE — 12400001 ZZH R&B MH UMMC

## 2019-05-16 RX ORDER — BISACODYL 10 MG
10 SUPPOSITORY, RECTAL RECTAL DAILY PRN
Status: DISCONTINUED | OUTPATIENT
Start: 2019-05-16 | End: 2019-05-21 | Stop reason: HOSPADM

## 2019-05-16 RX ORDER — NICOTINE 21 MG/24HR
1 PATCH, TRANSDERMAL 24 HOURS TRANSDERMAL DAILY
Status: DISCONTINUED | OUTPATIENT
Start: 2019-05-16 | End: 2019-05-21 | Stop reason: HOSPADM

## 2019-05-16 RX ORDER — ALBUTEROL SULFATE 90 UG/1
1-2 AEROSOL, METERED RESPIRATORY (INHALATION) EVERY 4 HOURS PRN
Status: DISCONTINUED | OUTPATIENT
Start: 2019-05-16 | End: 2019-05-21 | Stop reason: HOSPADM

## 2019-05-16 RX ORDER — ALUMINA, MAGNESIA, AND SIMETHICONE 2400; 2400; 240 MG/30ML; MG/30ML; MG/30ML
30 SUSPENSION ORAL EVERY 4 HOURS PRN
Status: DISCONTINUED | OUTPATIENT
Start: 2019-05-16 | End: 2019-05-21 | Stop reason: HOSPADM

## 2019-05-16 RX ORDER — OLANZAPINE 10 MG/1
10 TABLET ORAL
Status: DISCONTINUED | OUTPATIENT
Start: 2019-05-16 | End: 2019-05-21 | Stop reason: HOSPADM

## 2019-05-16 RX ORDER — HYDROXYZINE HYDROCHLORIDE 25 MG/1
25 TABLET, FILM COATED ORAL EVERY 4 HOURS PRN
Status: DISCONTINUED | OUTPATIENT
Start: 2019-05-16 | End: 2019-05-21 | Stop reason: HOSPADM

## 2019-05-16 RX ORDER — TRAZODONE HYDROCHLORIDE 50 MG/1
50 TABLET, FILM COATED ORAL
Status: DISCONTINUED | OUTPATIENT
Start: 2019-05-16 | End: 2019-05-21 | Stop reason: HOSPADM

## 2019-05-16 RX ORDER — ACETAMINOPHEN 325 MG/1
650 TABLET ORAL EVERY 4 HOURS PRN
Status: DISCONTINUED | OUTPATIENT
Start: 2019-05-16 | End: 2019-05-21 | Stop reason: HOSPADM

## 2019-05-16 RX ORDER — OLANZAPINE 10 MG/2ML
10 INJECTION, POWDER, FOR SOLUTION INTRAMUSCULAR
Status: DISCONTINUED | OUTPATIENT
Start: 2019-05-16 | End: 2019-05-21 | Stop reason: HOSPADM

## 2019-05-16 RX ADMIN — NICOTINE POLACRILEX 4 MG: 4 GUM, CHEWING ORAL at 19:28

## 2019-05-16 ASSESSMENT — ACTIVITIES OF DAILY LIVING (ADL)
DRESS: SCRUBS (BEHAVIORAL HEALTH)
TRANSFERRING: 0-->INDEPENDENT
AMBULATION: 0-->INDEPENDENT
ORAL_HYGIENE: INDEPENDENT
ORAL_HYGIENE: INDEPENDENT
COGNITION: 0 - NO COGNITION ISSUES REPORTED
SWALLOWING: 0-->SWALLOWS FOODS/LIQUIDS WITHOUT DIFFICULTY
RETIRED_EATING: 0-->INDEPENDENT
FALL_HISTORY_WITHIN_LAST_SIX_MONTHS: NO
TOILETING: 0-->INDEPENDENT
DRESS: 0-->INDEPENDENT
DRESS: SCRUBS (BEHAVIORAL HEALTH)
BATHING: 0-->INDEPENDENT
RETIRED_COMMUNICATION: 0-->UNDERSTANDS/COMMUNICATES WITHOUT DIFFICULTY
HYGIENE/GROOMING: INDEPENDENT
HYGIENE/GROOMING: INDEPENDENT
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
## 2019-05-16 NOTE — ED PROVIDER NOTES
The pt has a bed. Awaiting the unit to tell us that they are ready.  The pt is stable and calm     Dimitrios Cruz MD  05/15/19 7606

## 2019-05-16 NOTE — ED NOTES
ED to Behavioral Floor Handoff    SITUATION  Ilene Liu is a 29 year old female who speaks English and lives in a home with family members The patient arrived in the ED by ambulance from home with a complaint of Drug Overdose  .The patient's current symptoms started/worsened 1 day(s) ago and during this time the symptoms have increased.   In the ED, pt was diagnosed with   Final diagnoses:   Suicidal overdose, initial encounter (H)        Initial vitals were: BP: 129/82  Pulse: 93  Heart Rate: 102  Temp: 98.7  F (37.1  C)  Resp: 18  Weight: 90.7 kg (200 lb)  SpO2: 99 %   --------  Is the patient diabetic? No   If yes, last blood glucose? --     If yes, was this treated in the ED? --  --------  Is the patient inebriated (ETOH) No or Impaired on other substances? No  MSSA done? N/A  Last MSSA score: --    Were withdrawal symptoms treated? N/A  Does the patient have a seizure history? No. If yes, date of most recent seizure--  --------  Is the patient patient experiencing suicidal ideation? reports occasional suicidal thoughts representing feeling that life is not worth feeling    Homicidal ideation? denies current or recent homicidal ideation or behaviors.    Self-injurious behavior/urges? denies current or recent self injurious behavior or ideation.  ------  Was pt aggressive in the ED Yes  Was a code called No  Is the pt now cooperative? Yes  -------  Meds given in ED:   Medications   haloperidol lactate (HALDOL) injection 5 mg (has no administration in time range)   LORazepam (ATIVAN) injection 2 mg (has no administration in time range)   LORazepam (ATIVAN) injection 1 mg (1 mg Intravenous Given 5/15/19 4927)      Family present during ED course? No  Family currently present? No    BACKGROUND  Does the patient have a cognitive impairment or developmental disability? No  Allergies: No Known Allergies.   Social demographics are   Social History     Socioeconomic History     Marital status: Single     Spouse  "name: None     Number of children: None     Years of education: None     Highest education level: None   Occupational History     None   Social Needs     Financial resource strain: None     Food insecurity:     Worry: None     Inability: None     Transportation needs:     Medical: None     Non-medical: None   Tobacco Use     Smoking status: Current Every Day Smoker     Smokeless tobacco: Never Used   Substance and Sexual Activity     Alcohol use: Yes     Drug use: Yes     Types: Marijuana, \"Crack\" cocaine     Sexual activity: None   Lifestyle     Physical activity:     Days per week: None     Minutes per session: None     Stress: None   Relationships     Social connections:     Talks on phone: None     Gets together: None     Attends Yazidi service: None     Active member of club or organization: None     Attends meetings of clubs or organizations: None     Relationship status: None     Intimate partner violence:     Fear of current or ex partner: None     Emotionally abused: None     Physically abused: None     Forced sexual activity: None   Other Topics Concern     None   Social History Narrative     None        ASSESSMENT  Labs results   Labs Ordered and Resulted from Time of ED Arrival Up to the Time of Departure from the ED   COMPREHENSIVE METABOLIC PANEL - Abnormal; Notable for the following components:       Result Value    Glucose 112 (*)     All other components within normal limits   DRUG ABUSE SCREEN 6 CHEM DEP URINE ([facility]) - Abnormal; Notable for the following components:    Cannabinoids Qual Urine Positive (*)     All other components within normal limits   CBC WITH PLATELETS DIFFERENTIAL   ACETAMINOPHEN LEVEL   SALICYLATE LEVEL   HCG QUALITATIVE   PERIPHERAL IV CATHETER      Imaging Studies: No results found for this or any previous visit (from the past 24 hour(s)).   Most recent vital signs BP (!) 124/92   Pulse 101   Temp 98.7  F (37.1  C) (Oral)   Resp 21   Wt 90.7 kg (200 lb)   LMP " 04/24/2019   SpO2 100%   BMI 32.28 kg/m     Abnormal labs/tests/findings requiring intervention:---   Pain control: pt had none  Nausea control: pt had none    RECOMMENDATION  Are any infection precautions needed (MRSA, VRE, etc.)? No If yes, what infection? --  ---  Does the patient have mobility issues? independently. If yes, what device does the pt use? ---  ---  Is patient on 72 hour hold or commitment? Yes If on 72 hour hold, have hold and rights been given to patient? Yes  Are admitting orders written if after 10 p.m. ?N/A  Tasks needing to be completed:---     Catherine Posadas   [facility]-- [phone] [place] [facility]   [phone] [facility] ED

## 2019-05-16 NOTE — TELEPHONE ENCOUNTER
R: addendum: author left msg at 11:24 am for RN on #12 to call back; er informed at 11:25 am            jimmy

## 2019-05-16 NOTE — PLAN OF CARE
Patient was brought in after she reported that she had taken an overdose of Zyprexa,Cogentin and diphenhydramine. When she arrived on the unit she said that she had not taken all of those meds.  Writer asked pt about the empty Cogentin 0.5 mg bottle of 60 doses filled on 2-26-19.  She then said that the only mediation she took was Cogentin. Patient was asked if she is currently suicidal because the reports from the ED noted that she at times said she was and other times said she denied suicidal ideation.  Patient quickly said she is not suicidal and wanted to know how soon she could be discharged.  She was informed that the Dr would see her tomorrow. She was accepting of that and stated she wanted to get some sleep. She did agree that she would tell staff if she was feeling suicidal.    Patient did state that she is on Haldol Dec and that her next dose is scheduled for May 21. Our  Eden has placed a call to the get clarification on when her next Haldol Decanoate is due.
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
## 2019-05-16 NOTE — PROGRESS NOTES
05/16/19 1259   Patient Belongings   Patient Belongings locker   Patient Belongings Put in Hospital Secure Location (Security or Locker, etc.) cell phone/electronics;shoes   Belongings Search Yes   Clothing Search Yes   Second Staff Anyi        -Cloths   -Identification card  -[employer] phone (black)  -60 cents   -Red Slippers       All ites are in the locker #2    A               Admission:  I am responsible for any personal items that are not sent to the safe or pharmacy.  [facility] is not responsible for loss, theft or damage of any property in my possession.    Signature:  _________________________________ Date: _______  Time: _____                                              Staff Signature:  ____________________________ Date: ________  Time: _____      2nd Staff person, if patient is unable/unwilling to sign:    Signature: ________________________________ Date: ________  Time: _____     Discharge:  [facility] has returned all of my personal belongings:    Signature: _________________________________ Date: ________  Time: _____                                          Staff Signature:  ____________________________ Date: ________  Time: _____

## 2019-05-16 NOTE — TELEPHONE ENCOUNTER
R: author spoke with Marleni about Tiffany recommending pt admit to unit other than #12. Marleni said pt can go to #10 and that they are able to provide a 1:1 staff for pt. #10/Avni per notes from ROSALVA in intake last ector. jimmy

## 2019-05-16 NOTE — TELEPHONE ENCOUNTER
R: Marleni called saying pt has hx on #12 and may be more appropriate for that unit than for #10. She said if pt admits to #10, she will need a 1:1 staff but not if she admits to #12. Hx of trying to escape on #12. She said she will discuss case with #12 staff and with #10 staff but in the mean time requests that author discuss case w/ Tiffany and ask her if she will accept pt on #12. mbw  Paged Tiffany at 8:40 am. mbw  Author discussed case with Tiffany who declined to accept pt for #12 saying she does not currently meet #12 criteria for admit. jimmy  Paged Marleni at 9:26 am.

## 2019-05-16 NOTE — TELEPHONE ENCOUNTER
R: Dr Allen called saying she spoke with Avni and said Avni feels pt is not appropriate for #10. Rosie agreed to take pt on #12. mbw  #12/rosie accepted for herself                    jimmy

## 2019-05-17 PROCEDURE — 12400001 ZZH R&B MH UMMC

## 2019-05-17 PROCEDURE — 99223 1ST HOSP IP/OBS HIGH 75: CPT | Mod: AI | Performed by: PSYCHIATRY & NEUROLOGY

## 2019-05-17 ASSESSMENT — ACTIVITIES OF DAILY LIVING (ADL)
HYGIENE/GROOMING: HANDWASHING
DRESS: SCRUBS (BEHAVIORAL HEALTH)
DRESS: SCRUBS (BEHAVIORAL HEALTH)
ORAL_HYGIENE: INDEPENDENT
HYGIENE/GROOMING: INDEPENDENT
ORAL_HYGIENE: INDEPENDENT

## 2019-05-17 NOTE — PLAN OF CARE
BEHAVIORAL TEAM DISCUSSION    Participants: dr vleez, felix calderon rn, patricia joyce rn, opal de jesus  Progress: None.   Pt is new admission.   Admitted due to alleged overdose on medications.   Became belligerent in the ER - threatening and screaming.  Continued Stay Criteria/Rationale:  new admit  Medical/Physical:  none noted  Precautions:   Behavioral Orders   Procedures    Assault precautions    Code 1 - Restrict to Unit    Elopement precautions    Routine Programming     As clinically indicated    Self Injury Precaution    Status 15     Every 15 minutes.    Suicide precautions     Patients on Suicide Precautions should have a Combination Diet ordered that includes a Diet selection(s) AND a Behavioral Tray selection for Safe Tray - with utensils, or Safe Tray - NO utensils       Plan:  Meds per dr velez.  Pt is on long acting injectable neuroleptic.    The [facility] [place] operated by [facility] CM's have been contacted with a request to revoke the PD.  Pt lives with mother and will be discharged back there.     Rationale for change in precautions or plan: no change at this time.

## 2019-05-17 NOTE — PROGRESS NOTES
05/17/19 1422   Behavioral Health   Hallucinations denies / not responding to hallucinations   Thinking poor concentration   Orientation time: oriented;date: oriented;place: oriented;person: oriented   Memory baseline memory   Insight poor   Judgement impaired   Eye Contact at examiner   Affect blunted, flat   Mood mood is calm   Physical Appearance/Attire attire appropriate to age and situation   Hygiene neglected grooming - unclean body, hair, teeth   Suicidality other (see comments)  (denied)   1. Wish to be Dead No   2. Non-Specific Active Suicidal Thoughts  No   Self Injury other (see comment)  (denied)   Elopement   (none observed)   Activity isolative   Speech coherent;clear   Medication Sensitivity no observed side effects;no stated side effects   Psychomotor / Gait balanced;steady   Psycho Education   Type of Intervention 1:1 intervention   Response participates, initiates socially appropriate   Hours 0.5   Treatment Detail   (check in)   Activities of Daily Living   Hygiene/Grooming handwashing   Oral Hygiene independent   Dress scrubs (behavioral health)   Room Organization independent   Activity   Activity Assistance Provided independent     Pt was isolative in her room and denied any SI/SIb this shift. Pt goal today is to get discharge and go home. Pt refused attending any groups this morning. Overall, pt was calm and cooperative.

## 2019-05-17 NOTE — PLAN OF CARE
Pt isolated to room for entire shift.  She slept for much of the evening, but then requested her dinner and nicotine gum.  Pt presented as guarded, covering her mouth for a portion of her interaction with RN writer.  She denies all mental health symptoms, including SI, and doesn't believe that she needs to be in the hospital.  She understands that she is on a 72 hour hold, but wants to leave as soon as possible.  No behavioral outbursts or aggression this evening.

## 2019-05-18 PROCEDURE — 25000132 ZZH RX MED GY IP 250 OP 250 PS 637: Performed by: PSYCHIATRY & NEUROLOGY

## 2019-05-18 PROCEDURE — 12400001 ZZH R&B MH UMMC

## 2019-05-18 RX ADMIN — HYDROXYZINE HYDROCHLORIDE 25 MG: 25 TABLET ORAL at 19:06

## 2019-05-18 RX ADMIN — TRAZODONE HYDROCHLORIDE 50 MG: 50 TABLET ORAL at 21:00

## 2019-05-18 RX ADMIN — NICOTINE POLACRILEX 4 MG: 4 GUM, CHEWING ORAL at 15:10

## 2019-05-18 RX ADMIN — TRAZODONE HYDROCHLORIDE 50 MG: 50 TABLET ORAL at 19:06

## 2019-05-18 ASSESSMENT — ACTIVITIES OF DAILY LIVING (ADL)
DRESS: SCRUBS (BEHAVIORAL HEALTH)
ORAL_HYGIENE: INDEPENDENT
HYGIENE/GROOMING: HANDWASHING
DRESS: SCRUBS (BEHAVIORAL HEALTH)
HYGIENE/GROOMING: INDEPENDENT
ORAL_HYGIENE: INDEPENDENT
LAUNDRY: UNABLE TO COMPLETE

## 2019-05-18 NOTE — PROGRESS NOTES
05/18/19 1414   Behavioral Health   Hallucinations denies / not responding to hallucinations   Thinking poor concentration   Orientation time: oriented;date: oriented;place: oriented;person: oriented   Memory baseline memory   Insight poor   Judgement impaired   Eye Contact at examiner   Affect blunted, flat   Mood mood is calm   Physical Appearance/Attire neat   Hygiene well groomed   Suicidality other (see comments)  (denied)   1. Wish to be Dead No   2. Non-Specific Active Suicidal Thoughts  No   Self Injury other (see comment)  (denied)   Activity isolative   Speech coherent;clear   Medication Sensitivity no observed side effects;no stated side effects   Psychomotor / Gait balanced;steady   Psycho Education   Type of Intervention 1:1 intervention   Response participates, initiates socially appropriate   Hours 0.5   Activities of Daily Living   Hygiene/Grooming handwashing   Oral Hygiene independent   Dress scrubs (behavioral health)   Room Organization independent   Activity   Activity Assistance Provided independent     Pt isolated to room for entire shift. Pt slept most of the day shift and really wants to get discharge as soon as possible. Pt denied any SI/SIb this morning. Overall, Pt was calm and took shower.

## 2019-05-18 NOTE — PLAN OF CARE
Pt isolated and withdrawn to room for entire day.  She presents as guarded, and denies all mental health symptoms.  Patient insists that she doesn't need to be in the hospital, and that she is not suicidal.  She had no behavioral outburst or agitation this evening.  She has no scheduled medications at this time, other that a nicotine patch, which she has refused.

## 2019-05-19 PROCEDURE — 25000132 ZZH RX MED GY IP 250 OP 250 PS 637: Performed by: PSYCHIATRY & NEUROLOGY

## 2019-05-19 PROCEDURE — 12400001 ZZH R&B MH UMMC

## 2019-05-19 RX ADMIN — NICOTINE POLACRILEX 4 MG: 4 GUM, CHEWING ORAL at 17:47

## 2019-05-19 RX ADMIN — NICOTINE POLACRILEX 4 MG: 4 GUM, CHEWING ORAL at 11:23

## 2019-05-19 RX ADMIN — HYDROXYZINE HYDROCHLORIDE 25 MG: 25 TABLET ORAL at 19:07

## 2019-05-19 RX ADMIN — TRAZODONE HYDROCHLORIDE 50 MG: 50 TABLET ORAL at 19:07

## 2019-05-19 RX ADMIN — MAGNESIUM HYDROXIDE 30 ML: 400 SUSPENSION ORAL at 19:07

## 2019-05-19 ASSESSMENT — ACTIVITIES OF DAILY LIVING (ADL)
ORAL_HYGIENE: INDEPENDENT
LAUNDRY: UNABLE TO COMPLETE
DRESS: SCRUBS (BEHAVIORAL HEALTH)
HYGIENE/GROOMING: INDEPENDENT
HYGIENE/GROOMING: INDEPENDENT
ORAL_HYGIENE: INDEPENDENT
DRESS: SCRUBS (BEHAVIORAL HEALTH)

## 2019-05-19 NOTE — PROGRESS NOTES
05/19/19 1511   Sleep/Rest/Relaxation   Day/Evening Time Hours napping;resting in bed   Number of hours napping 2 hours   Number of hours resting in bed 3 hours   Behavioral Health   Hallucinations denies / not responding to hallucinations   Thinking poor concentration   Orientation person: oriented;place: oriented   Memory baseline memory   Insight poor   Judgement impaired   Eye Contact at examiner   Affect blunted, flat   Mood anxious   Physical Appearance/Attire appears stated age;attire appropriate to age and situation   Hygiene other (see comment)  (fair)   Suicidality other (see comments)  (denies)   1. Wish to be Dead No   2. Non-Specific Active Suicidal Thoughts  No   Self Injury other (see comment)  (denies)   Elopement Statements about wanting to leave   Activity other (see comment)  (appropriate)   Speech coherent;clear   Medication Sensitivity no stated side effects;no observed side effects   Psychomotor / Gait balanced;steady   Coping/Psychosocial   Verbalized Emotional State acceptance   Activities of Daily Living   Hygiene/Grooming independent   Oral Hygiene independent   Dress scrubs (behavioral health)   Room Organization independent   Pt had a positive shift. She was out in the milieu an increasing amount, with full range affect, socializing with peers. She contracted for safety, no complaints or requests.

## 2019-05-19 NOTE — PLAN OF CARE
Problem: Suicide Risk  Goal: Absence of Self-Harm  Outcome: Improving     Patient had an increased presence in the milieu this shift.  She was social with select peers.  She was calm and cooperative on approach.  She endorsed anxiety concerning discharge and accepted PRN medications, which provided some relief.  She asked appropriate questions concerning discharge.  She agreed that she would need to continue to have safe behaviors before discharge.  She agreed to talk to her doctor on Monday.  She contracted for safety and denied SI/SIB.  She denied AH and VH.  No overt signs of psychosis observed.      Patient had no scheduled medications.  She denied acute physical concerns.

## 2019-05-20 PROCEDURE — 25000132 ZZH RX MED GY IP 250 OP 250 PS 637: Performed by: PSYCHIATRY & NEUROLOGY

## 2019-05-20 PROCEDURE — 25000128 H RX IP 250 OP 636: Performed by: PSYCHIATRY & NEUROLOGY

## 2019-05-20 PROCEDURE — 12400001 ZZH R&B MH UMMC

## 2019-05-20 PROCEDURE — 99207 ZZC CDG-MDM COMPONENT: MEETS MODERATE - DOWN CODED: CPT | Performed by: PSYCHIATRY & NEUROLOGY

## 2019-05-20 PROCEDURE — 99232 SBSQ HOSP IP/OBS MODERATE 35: CPT | Performed by: PSYCHIATRY & NEUROLOGY

## 2019-05-20 RX ORDER — BENZTROPINE MESYLATE 0.5 MG/1
0.5 TABLET ORAL 2 TIMES DAILY
Qty: 28 TABLET | Refills: 0 | Status: SHIPPED | OUTPATIENT
Start: 2019-05-20 | End: 2019-05-20

## 2019-05-20 RX ORDER — BENZTROPINE MESYLATE 0.5 MG/1
0.5 TABLET ORAL 2 TIMES DAILY
Qty: 28 TABLET | Refills: 1 | Status: ON HOLD | OUTPATIENT
Start: 2019-05-20 | End: 2019-07-16

## 2019-05-20 RX ORDER — HALOPERIDOL DECANOATE 100 MG/ML
150 INJECTION INTRAMUSCULAR
Status: DISCONTINUED | OUTPATIENT
Start: 2019-05-20 | End: 2019-05-21 | Stop reason: HOSPADM

## 2019-05-20 RX ORDER — BENZTROPINE MESYLATE 0.5 MG/1
0.5 TABLET ORAL 2 TIMES DAILY
Status: DISCONTINUED | OUTPATIENT
Start: 2019-05-20 | End: 2019-05-21 | Stop reason: HOSPADM

## 2019-05-20 RX ORDER — HALOPERIDOL DECANOATE 100 MG/ML
150 INJECTION INTRAMUSCULAR
Status: ON HOLD
Start: 2019-06-17 | End: 2019-07-16

## 2019-05-20 RX ADMIN — BENZTROPINE MESYLATE 0.5 MG: 0.5 TABLET ORAL at 19:10

## 2019-05-20 RX ADMIN — HYDROXYZINE HYDROCHLORIDE 25 MG: 25 TABLET ORAL at 19:10

## 2019-05-20 RX ADMIN — BENZTROPINE MESYLATE 0.5 MG: 0.5 TABLET ORAL at 13:03

## 2019-05-20 RX ADMIN — NICOTINE POLACRILEX 4 MG: 4 GUM, CHEWING ORAL at 19:11

## 2019-05-20 RX ADMIN — TRAZODONE HYDROCHLORIDE 50 MG: 50 TABLET ORAL at 19:10

## 2019-05-20 RX ADMIN — HALOPERIDOL DECANOATE 150 MG: 100 INJECTION INTRAMUSCULAR at 13:03

## 2019-05-20 ASSESSMENT — ACTIVITIES OF DAILY LIVING (ADL)
DRESS: SCRUBS (BEHAVIORAL HEALTH)
HYGIENE/GROOMING: INDEPENDENT
LAUNDRY: UNABLE TO COMPLETE
DRESS: SCRUBS (BEHAVIORAL HEALTH);INDEPENDENT
ORAL_HYGIENE: INDEPENDENT
ORAL_HYGIENE: INDEPENDENT
HYGIENE/GROOMING: INDEPENDENT

## 2019-05-20 NOTE — PROGRESS NOTES
This writer spoke to pt's [facility] worker: Anival Jordynmansoor - Phone: [phone] - we discussed pt's discharge for tomorrow and she is in agreement.  Pt will discharge home with her Mother.  When I receive the PD from [facility] will send to her as well.     Psychiatry Appointment set up for:  [facility]  Next Appointment:  Tuesday, May 28th @ 1:30 w/ Dr Angela Muller   [address] [place] [address], [address], [facility]  Phone:  [phone]    FAX:   [phone]

## 2019-05-20 NOTE — PROGRESS NOTES
"[facility], [place]   Psychiatric Progress Note  Hospital Day: 4        Interim History:   The patient's care was discussed with the treatment team during the daily team meeting and/or staff's chart notes were reviewed.  Staff report patient remains mostly isolative to her room and continues to express desire for discharge as soon as possible.  Patient has consistently denied suicidal or homicidal ideation, or self-harm urges.  No overt evidence of psychosis or jacqueline.  No episodes of violent or aggressive behavior.  Toward the end of the weekend, patient had an increase presents in the milieu.  She was social with select peers.  She denies auditory and visual hallucinations.    Upon interview, the patient continues to deny suicidal homicidal ideation.  She continues to express desire for discharge, though is amenable with plan to remain hospitalized overnight to ensure that she tolerates Haldol Decanoate and to ensure ongoing resolution of symptoms.  She states that Haldol is \"the best medication I have ever been on.\"  She notes reduction in her anxiety and ability to leave her room and socialize with peers and staff, \"which I would have never been able to do before.\"  She again identifies her daughter and other family members as protective factors.  She is future oriented.  Initially, she denied all side effects of Haldol, however, later in the morning patient approached this writer and requested reinitiation of Cogentin for restlessness.  She notes that she had been prescribed Cogentin in the past and that it was helpful for addressing the side effect.  We discussed plan for tentative discharge tomorrow.  She denied acute medical concerns.  She had no further requests or concerns.    Spoke with patient's mother over the phone.  Patient's mother denied imminent safety concerns and is comfortable with plan for patient to return home tomorrow.  She had no additional questions or concerns " for this writer.         Medications:       benztropine  0.5 mg Oral BID     haloperidol decanoate  150 mg Intramuscular Q30 Days     haloperidol lactate  5 mg Intramuscular Once     LORazepam  2 mg Intramuscular Once     nicotine  1 patch Transdermal Daily     nicotine   Transdermal Q8H     nicotine   Transdermal Daily          Allergies:   No Known Allergies       Labs:   No results found for this or any previous visit (from the past 24 hour(s)).       Psychiatric Examination:     /75   Pulse 89   Temp 97.6  F (36.4  C) (Tympanic)   Resp 16   Wt 90.7 kg (200 lb)   LMP 04/24/2019   SpO2 98%   BMI 32.28 kg/m    Weight is 200 lbs 0 oz  Body mass index is 32.28 kg/m .    Weight over time:  Vitals:    05/15/19 0122   Weight: 90.7 kg (200 lb)       Orthostatic Vitals       Most Recent      Sitting Orthostatic /75 05/20 0852    Sitting Orthostatic Pulse (bpm) 89 05/20 0852    Standing Orthostatic /81 05/20 0852    Standing Orthostatic Pulse (bpm) 105 05/20 0852            Cardiometabolic risk assessment. 05/20/19      Reviewed patient profile for cardiometabolic risk factors    Date taken /Value  REFERENCE RANGE   Abdominal Obesity  (Waist Circumference)   See nursing flowsheet Women ?35 in (88 cm)   Men ?40 in (102 cm)      Triglycerides  No results found for: TRIG    ?150 mg/dL (1.7 mmol/L) or current treatment for elevated triglycerides   HDL cholesterol  No results found for: HDL]   Women <50 mg/dL (1.3 mmol/L) in women or current treatment for low HDL cholesterol  Men <40 mg/dL (1 mmol/L) in men or current treatment for low HDL cholesterol     Fasting plasma glucose (FPG) Lab Results   Component Value Date     05/15/2019      FPG ?100 mg/dL (5.6 mmol/L) or treatment for elevated blood glucose   Blood pressure  BP Readings from Last 3 Encounters:   05/20/19 119/75   05/04/19 (!) 136/95   03/02/19 (!) 144/93    Blood pressure ?130/85 mmHg or treatment for elevated blood pressure    Family History  See family history     Appearance: awake, alert and adequately groomed  Attitude:  cooperative  Eye Contact:  good  Mood:  better  Affect:  mood congruent and intensity is blunted  Speech:  clear, coherent  Language: fluent and intact in English  Psychomotor, Gait, Musculoskeletal:  no evidence of tardive dyskinesia, dystonia, or tics  Throught Process:  logical, linear and goal oriented  Associations:  no loose associations  Thought Content:  no evidence of suicidal ideation or homicidal ideation and no evidence of psychotic thought  Insight:  fair  Judgement:  intact  Oriented to:  time, person, and place  Attention Span and Concentration:  intact  Recent and Remote Memory:  intact  Fund of Knowledge:  low-normal    Clinical Global Impressions  First:  Considering your total clinical experience with this particular patient population, how severe are the patient's symptoms at this time?: 7 (05/16/19 1653)  Compared to the patient's condition at the START of treatment, this patient's condition is:: 4 (05/16/19 1653)  Most recent:  Considering your total clinical experience with this particular patient population, how severe are the patient's symptoms at this time?: 7 (05/16/19 1653)  Compared to the patient's condition at the START of treatment, this patient's condition is:: 4 (05/16/19 1653)           Precautions:     Behavioral Orders   Procedures     Assault precautions     Code 1 - Restrict to Unit     Elopement precautions     Routine Programming     As clinically indicated     Self Injury Precaution     Status 15     Every 15 minutes.     Suicide precautions     Patients on Suicide Precautions should have a Combination Diet ordered that includes a Diet selection(s) AND a Behavioral Tray selection for Safe Tray - with utensils, or Safe Tray - NO utensils            Diagnoses:        Suicidal overdose, initial encounter (H)  Intentional droperidol overdose, initial encounter (H)  Intentional  chlorpheniramine overdose, initial encounter (H)  Intentional acetazolamide overdose, initial encounter (H)  Tachycardia, unspecified  Schizoaffective disorder, bipolar type (H)         Assessment & Plan:     Assessment and hospital summary:  This patient is a 29 year old Italian female with history of below diagnoses who presented to ED following suicide attempt via overdose on Cogentin, Zyprexa, and Benadryl in context of recent hospitalization, civil commitment, legal stressors and treatment nonadherence. Inpatient psychiatric hospitalization is warranted at this time for safety, stabilization, and possible adjustment in medications.    Target psychiatric symptoms and interventions:  Administer Haldol Decanoate 150 mg today  Add Cogentin 0.5 mg twice daily    Medical Problems and Treatments:  Patient denies acute medical concerns    Behavioral/Psychological/Social:  Continue to encourage participation in groups    Legal: Patient under commitment with [facility]    Safety:  - Continue precautions as noted above  - Status 15 minute checks    Disposition: Due to absence of imminent safety concerns, tentative plan for discharge tomorrow.  Patient's  and mother are in agreement with this plan.    Roxana Allen MD  [facility]

## 2019-05-20 NOTE — PLAN OF CARE
Problem: Suicidal Behavior  Goal: Suicidal Behavior is Absent or Managed  Outcome: Improving    Patient was visible in the milieu and social with peers.  She had a flat affect, but brightened on approach.  She was calm and polite with staff.  She denied SI and SiB.  She contracted for safety.  No overt signs of psychosis/jacqueline.  No agitation, or aggression.    Patient stated that her support network consists of her mom and sister.  She utilizes music and art (drawing) as a coping mechanism.  Patient stated she would be medication adherent ([place]) after discharge.      Patient requested PRN medications for sleep.  She complained of constipation and accepted PRN milk of magnesia.      Patient asked if her Haldol dec could be changed from 5/21 to 5/20, as it is a barrier to discharge.  Writer was unsure, but told patient she should talk to her treatment team, which the patient accepted.

## 2019-05-20 NOTE — PROGRESS NOTES
This writer reached out to Jessie Adams at [facility] where this pt is committed to.  We will need to do a local remote provisional discharge for her.      Also informed pt's OP CCM of discharge tomorrow.

## 2019-05-20 NOTE — PROGRESS NOTES
Visited with pt on the basis of spiritual support for the pt.  Reflected with pt around her hospital experience, sources of spiritual and emotional support and current spiritual health needs. Pt talked about her current situation and what it means for her. During my conversation with her, I let her know that I could be support to her during her hospitalization. I encouraged her to see God as God of love, compassion and mercy full.      Emotional support. Reflective conversation integrating illness elements and family spiritual narratives. I shared conversation that would invite God into the room and to bless her, support her in her suffering. Active listening with the patient was used. Coping skills were suggested to help patient. Offered alternative ways to view her vanesa. Offered methods to see the power of a loving God. Offered alternative ways to view her own suffering and see how vanesa can be a powerful source of strength. I encouraged her to trust medical staff. I provided a special prayer asking God to help and ease and eliminate any suffering and pain that she feels.      Pt received spiritual support and reflective conversation in the context of this hospitalization.  Pt expressed appreciation for the visit and the encouragement she felt that she has God s support in her struggles that God knows of her suffering.      Will continue to provide support to pt/family during their hospitalization at least 1x/wk

## 2019-05-20 NOTE — PROGRESS NOTES
Pt appears to be in a good mod.  She is planning to d/ her family tomorrow.  Pt denies anx, dep, SI, SIB.  Pt was pleasant + appeared to be in a good mood.

## 2019-05-21 VITALS
WEIGHT: 200 LBS | DIASTOLIC BLOOD PRESSURE: 62 MMHG | HEART RATE: 97 BPM | BODY MASS INDEX: 32.28 KG/M2 | TEMPERATURE: 96.5 F | OXYGEN SATURATION: 98 % | RESPIRATION RATE: 18 BRPM | SYSTOLIC BLOOD PRESSURE: 110 MMHG

## 2019-05-21 PROCEDURE — 25000132 ZZH RX MED GY IP 250 OP 250 PS 637: Performed by: PSYCHIATRY & NEUROLOGY

## 2019-05-21 PROCEDURE — 99238 HOSP IP/OBS DSCHRG MGMT 30/<: CPT | Performed by: PSYCHIATRY & NEUROLOGY

## 2019-05-21 RX ADMIN — BENZTROPINE MESYLATE 0.5 MG: 0.5 TABLET ORAL at 08:20

## 2019-05-21 ASSESSMENT — ACTIVITIES OF DAILY LIVING (ADL)
ORAL_HYGIENE: INDEPENDENT
DRESS: SCRUBS (BEHAVIORAL HEALTH)
HYGIENE/GROOMING: INDEPENDENT
LAUNDRY: UNABLE TO COMPLETE

## 2019-05-21 NOTE — DISCHARGE INSTRUCTIONS
Behavioral Discharge Planning and Instructions        Summary  You were admitted to [facility] under the care of Dr. Aidee Allen.  You will be discharged home   You are under commitment status. You are committed as MI to [place] and the Commissioner of Human Services. The original commitment was continued for nine (9) months. It expires 10-17-19.  There is a [facility] order for Risperdal, Haldol, Thorozine, and Clozaril.     Main Diagnosis:   1.  Schizoaffective disorder, bipolar type. (F25.0)  2.  Provisional diagnosis of stimulant use disorder, cocaine type.  (F14.10)  3.  Likely cannabis use disorder, moderate to severe. (F12.20)     Mental Health Follow-Up:   [facility]  Next Appointment:  Tuesday, May 28th @ 1:30 w/ Dr Angela Muller   [address] [place] [address], [address], [facility]  Phone:  [phone]    FAX:   [phone]          Your [facility]  is Amelia at [phone] and  Anival Benoit at [phone]. The [facility] supervisor is Radha Chinchilla at [phone].        Attend all scheduled appointments with your outpatient providers. Call at least 24 hours in advance if you need to reschedule an appointment to ensure continued access to your outpatient providers.   Major Treatments, Procedures and Findings:  You were provided with: a psychiatric assessment, assessed for medical stability, medication evaluation and/or management and group therapy     Symptoms to Report: feeling more aggressive, increased confusion, losing more sleep, mood getting worse or thoughts of suicide     Early warning signs can include: increased depression or anxiety sleep disturbances increased thoughts or behaviors of suicide or self-harm  increased unusual thinking, such as paranoia or hearing voices     Safety and Wellness:  Take all medicines as directed.  Make no changes unless your doctor suggests them.      Follow treatment recommendations.  Refrain from alcohol and non-prescribed  "drugs.  If there is a concern for safety, call 911.     Resources:   Crisis Intervention: 485.589.5580 or 097-872-5258 (TTY: 153.437.9849).  Call anytime for help.  National [place] on Mental Illness (www.[place].jose.org): 221.645.3607 or 740-699-7052.  Suicide Awareness Voices of Education (SAVE) (www.save.org): 895-516-TBAK (6287)  National Suicide Prevention Line (www.mentalhealthmn.org): 865-679-LCTK (4106)  Mental Health Consumer/Survivor Network of [place] (www.mhcsn.net): 379.118.9301 or 862-248-4936  Mental Health Association of [place] (www.mentalhealth.org): 886.842.6997 or 133-754-6257  Self- Management and Recovery Training., SMART-- Toll free: 940.882.9318  www.[employer].iZettle  [facility] Crisis (COPE) Response - Adult 787 326-4899  Text 4 Life: txt \"LIFE\" to 32122 for immediate support and crisis intervention  Crisis text line: Text \"[place]\" to 743913. Free, confidential, 24/7.  Crisis Intervention: 227.111.7806 or 997-066-8784. Call anytime for help.   [facility] Crisis Team - Child: [phone]     The treatment team has appreciated the opportunity to work with you. If you have any questions or concerns our unit number is [phone]  "

## 2019-05-21 NOTE — PLAN OF CARE
48 hour nursing assessment completed. Patient discharging to home with her mother. All discharge instructions reviewed with patient and she verbalizes appropriate insight and understanding of all discharge recommendation including medications and follow up appointments. Patient discharged with all her belongings, including medications from [facility] discharge pharmacy. Patient denies SI/SIB and intends to follow up as recommended.

## 2019-05-21 NOTE — PROGRESS NOTES
Ilene spent most of this shift in her room. She was calm and pleasant. She is fully alert and oriented. She denies all mental health symptoms and does not appear responding to internal stimuli. She denied suicidal or homicidal ideation. No episodes of aggressive behavior. Pt is discharging tomorrow morning.
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
## 2019-06-23 ENCOUNTER — HOSPITAL ENCOUNTER (EMERGENCY)
Facility: CLINIC | Age: 29
Discharge: HOME | End: 2019-06-23
Attending: EMERGENCY MEDICINE | Admitting: EMERGENCY MEDICINE
Payer: COMMERCIAL

## 2019-06-23 ENCOUNTER — APPOINTMENT (OUTPATIENT)
Dept: GENERAL RADIOLOGY | Facility: CLINIC | Age: 29
End: 2019-06-23
Attending: EMERGENCY MEDICINE
Payer: COMMERCIAL

## 2019-06-23 VITALS
OXYGEN SATURATION: 97 % | DIASTOLIC BLOOD PRESSURE: 68 MMHG | BODY MASS INDEX: 34.16 KG/M2 | SYSTOLIC BLOOD PRESSURE: 124 MMHG | RESPIRATION RATE: 18 BRPM | TEMPERATURE: 98.1 F | WEIGHT: 211.64 LBS

## 2019-06-23 DIAGNOSIS — R05.9 COUGH: ICD-10-CM

## 2019-06-23 DIAGNOSIS — J98.01 BRONCHOSPASM: ICD-10-CM

## 2019-06-23 PROCEDURE — 94640 AIRWAY INHALATION TREATMENT: CPT

## 2019-06-23 PROCEDURE — 99283 EMERGENCY DEPT VISIT LOW MDM: CPT | Mod: 25

## 2019-06-23 PROCEDURE — 25000131 ZZH RX MED GY IP 250 OP 636 PS 637: Performed by: EMERGENCY MEDICINE

## 2019-06-23 PROCEDURE — 25000125 ZZHC RX 250: Performed by: EMERGENCY MEDICINE

## 2019-06-23 PROCEDURE — 71046 X-RAY EXAM CHEST 2 VIEWS: CPT

## 2019-06-23 RX ORDER — ALBUTEROL SULFATE 90 UG/1
2 AEROSOL, METERED RESPIRATORY (INHALATION) EVERY 6 HOURS PRN
Qty: 1 INHALER | Refills: 0 | Status: ON HOLD | OUTPATIENT
Start: 2019-06-23 | End: 2020-04-17

## 2019-06-23 RX ORDER — PREDNISONE 20 MG/1
TABLET ORAL
Qty: 10 TABLET | Refills: 0 | Status: SHIPPED | OUTPATIENT
Start: 2019-06-23 | End: 2019-07-10

## 2019-06-23 RX ORDER — PREDNISONE 20 MG/1
60 TABLET ORAL ONCE
Status: COMPLETED | OUTPATIENT
Start: 2019-06-23 | End: 2019-06-23

## 2019-06-23 RX ORDER — IPRATROPIUM BROMIDE AND ALBUTEROL SULFATE 2.5; .5 MG/3ML; MG/3ML
3 SOLUTION RESPIRATORY (INHALATION) ONCE
Status: COMPLETED | OUTPATIENT
Start: 2019-06-23 | End: 2019-06-23

## 2019-06-23 RX ADMIN — PREDNISONE 60 MG: 20 TABLET ORAL at 03:47

## 2019-06-23 RX ADMIN — IPRATROPIUM BROMIDE AND ALBUTEROL SULFATE 3 ML: .5; 3 SOLUTION RESPIRATORY (INHALATION) at 03:48

## 2019-06-23 NOTE — ED PROVIDER NOTES
History     Chief Complaint:  Cough    HPI   Ilene Liu is a 29 year old female with asthma  who presents with cough. The patient reports that she tested positive for influenza with normal x-ray results, approximately 2 months ago. She reports persistent cough since then, exacerbated at night when laying down, without any relief when using her inhaler. She notes that she smokes in the day time, with intention of quitting in the future. She notes episodes of vomiting.    Allergies:  No known drug allergies    Medications:    Albuterol inhaler  Benztropine    Past Medical History:    Asthma  H/o Cocaine abuse  Depressive disorder  Marijuana use  Schizo-affective type schizophrenia    Past Surgical History:    Neck dissection  Gyn surgery    Family History:    Diabetes    Social History:  Smoking status: Current every day smoker  Alcohol use: Yes  Drug use: Yes, marijuana, crack cocaine  PCP: Angela Muller  Presents to the ED with herself  Marital Status:  Single     Review of Systems   All other systems reviewed and are negative.    Physical Exam     Patient Vitals for the past 24 hrs:   BP Temp Temp src Heart Rate Resp SpO2 Weight   06/23/19 0445 -- -- -- -- -- 97 % --   06/23/19 0440 -- -- -- -- -- 100 % --   06/23/19 0322 124/68 98.1  F (36.7  C) Temporal 100 18 96 % 96 kg (211 lb 10.3 oz)       Physical Exam  Nursing note and vitals reviewed.  Constitutional: Cooperative.   HENT:   Mouth/Throat: Mucous membranes are normal.   Cardiovascular: Normal rate, regular rhythm and normal heart sounds.  No murmur.   Pulmonary/Chest: Effort normal. No respiratory distress. No rales. Diffuse expiratory wheezes in all lung fields.  Abdominal: Normal appearance  Musculoskeletal: No LE edema  Neurological: Alert.   Skin: Skin is warm and dry.   Psychiatric: Normal mood and affect.     Emergency Department Course   Imaging:  Radiographic findings were communicated with the patient who voiced understanding of the  findings.    Chest XR, PA & LAT  No acute abnormality.  As read by Radiology.    Interventions:  0347: 60 mg Prednisone PO  0348: 3 mL Duoneb solution    Emergency Department Course:  Past medical records, nursing notes, and vitals reviewed.  0333: I performed an exam of the patient and obtained history, as documented above.    The patient was sent for a chest x-ray while in the emergency department, findings above.    Patient discharged home with instructions regarding supportive care, medications, and reasons to return. The importance of close follow-up was reviewed.      Impression & Plan    Medical Decision Making:  Ilene Liu is a 29 year old female with asthma who presents with cough for the past 3 months. On exam she is wheezing and very tight. She is not hypoxic or febrile. Chest x-ray are clear showing no evidence of pneumonia or pneumothorax. After Duoneb, her wheezing is much improved. She would benefit from a course of steroids and I have also refilled her Albuterol to ensure that she has one that is not .  I will have her follow-up with her regular physician this week with return precautions discussed. Clinical suspicion for pulmonary embolism rather than life threatening disease is low.     Diagnosis:    ICD-10-CM   1. Bronchospasm J98.01   2. Cough R05       Disposition: Discharged to home    Discharge Medications:  Medication List      Started    predniSONE 20 MG tablet  Commonly known as:  DELTASONE  Take two tablets (= 40mg) each day for 5 (five) days     Modified    * albuterol 108 (90 Base) MCG/ACT inhaler  Commonly known as:  PROAIR HFA/PROVENTIL HFA/VENTOLIN HFA  1-2 puffs, Inhalation, EVERY 4 HOURS PRN  What changed:  Another medication with the same name was added. Make sure you understand how and when to take each.       I, Ana Rosa Deal, am serving as a scribe at 3:33 AM on 2019 to document services personally performed by Alonso Smith MD based on my observations and the  provider's statements to me.     Ana Rosa Deal  6/23/2019   [facility] EMERGENCY DEPARTMENT       Alonso Smith MD  06/23/19 0615

## 2019-06-23 NOTE — ED AVS SNAPSHOT
[facility]  [address]  [facility] [address]  Phone:  [phone]  Fax:  [phone]                                    Ilene Liu   MRN: [id number]    Department:  [facility]   Date of Visit:  6/23/2019           After Visit Summary Signature Page    I have received my discharge instructions, and my questions have been answered. I have discussed any challenges I see with this plan with the nurse or doctor.    ..........................................................................................................................................  Patient/Patient Representative Signature      ..........................................................................................................................................  Patient Representative Print Name and Relationship to Patient    ..................................................               ................................................  Date                                   Time    ..........................................................................................................................................  Reviewed by Signature/Title    ...................................................              ..............................................  Date                                               Time          22EPIC Rev 08/18

## 2019-06-23 NOTE — ED TRIAGE NOTES
Pt w/cough for last 3 months.  Pt states cough getting worse and now productive (white/yellow sputum).  Associated congestion.

## 2019-06-26 NOTE — PROGRESS NOTES
Pt is on SIO staffing due to active suicidal thought. Pt SIO was discontinued this afternoon at 1223 hrs. [facility] Suicide Risk Assessment completed. Pt denied any suicidal thought or plan. He contracted for safety while on this unit. Pt is alert and oriented to name and place. No agitating or aggressive behaviors observed.  Staff will continue to monitor patient per unit protocol.   The patient called requesting a refills as indicated.    Last date filled -  Pharmacy:

## 2019-07-10 ENCOUNTER — HOSPITAL ENCOUNTER (INPATIENT)
Facility: CLINIC | Age: 29
LOS: 6 days | Discharge: HOME | End: 2019-07-16
Attending: PSYCHIATRY & NEUROLOGY | Admitting: PSYCHIATRY & NEUROLOGY
Payer: COMMERCIAL

## 2019-07-10 DIAGNOSIS — R46.89 AGGRESSIVE BEHAVIOR: ICD-10-CM

## 2019-07-10 DIAGNOSIS — F25.0 SCHIZOAFFECTIVE DISORDER, BIPOLAR TYPE (H): ICD-10-CM

## 2019-07-10 PROBLEM — R45.850 HOMICIDAL BEHAVIOR: Status: ACTIVE | Noted: 2019-07-10

## 2019-07-10 PROCEDURE — 12400001 ZZH R&B MH UMMC

## 2019-07-10 PROCEDURE — 99285 EMERGENCY DEPT VISIT HI MDM: CPT | Mod: Z6 | Performed by: PSYCHIATRY & NEUROLOGY

## 2019-07-10 PROCEDURE — 25000128 H RX IP 250 OP 636: Performed by: PSYCHIATRY & NEUROLOGY

## 2019-07-10 PROCEDURE — 99285 EMERGENCY DEPT VISIT HI MDM: CPT | Mod: 25 | Performed by: PSYCHIATRY & NEUROLOGY

## 2019-07-10 PROCEDURE — 90791 PSYCH DIAGNOSTIC EVALUATION: CPT

## 2019-07-10 RX ORDER — ACETAMINOPHEN 325 MG/1
650 TABLET ORAL EVERY 4 HOURS PRN
Status: DISCONTINUED | OUTPATIENT
Start: 2019-07-10 | End: 2019-07-16 | Stop reason: HOSPADM

## 2019-07-10 RX ORDER — ALUMINA, MAGNESIA, AND SIMETHICONE 2400; 2400; 240 MG/30ML; MG/30ML; MG/30ML
30 SUSPENSION ORAL EVERY 4 HOURS PRN
Status: DISCONTINUED | OUTPATIENT
Start: 2019-07-10 | End: 2019-07-16 | Stop reason: HOSPADM

## 2019-07-10 RX ORDER — HYDROXYZINE HYDROCHLORIDE 25 MG/1
25 TABLET, FILM COATED ORAL EVERY 4 HOURS PRN
Status: DISCONTINUED | OUTPATIENT
Start: 2019-07-10 | End: 2019-07-12

## 2019-07-10 RX ORDER — ALBUTEROL SULFATE 90 UG/1
2 AEROSOL, METERED RESPIRATORY (INHALATION) EVERY 6 HOURS PRN
Status: DISCONTINUED | OUTPATIENT
Start: 2019-07-10 | End: 2019-07-16 | Stop reason: HOSPADM

## 2019-07-10 RX ORDER — OLANZAPINE 10 MG/2ML
10 INJECTION, POWDER, FOR SOLUTION INTRAMUSCULAR
Status: DISCONTINUED | OUTPATIENT
Start: 2019-07-10 | End: 2019-07-16 | Stop reason: HOSPADM

## 2019-07-10 RX ORDER — BISACODYL 10 MG
10 SUPPOSITORY, RECTAL RECTAL DAILY PRN
Status: DISCONTINUED | OUTPATIENT
Start: 2019-07-10 | End: 2019-07-16 | Stop reason: HOSPADM

## 2019-07-10 RX ORDER — TRAZODONE HYDROCHLORIDE 50 MG/1
50 TABLET, FILM COATED ORAL
Status: DISCONTINUED | OUTPATIENT
Start: 2019-07-10 | End: 2019-07-16 | Stop reason: HOSPADM

## 2019-07-10 RX ORDER — OLANZAPINE 10 MG/1
10 TABLET ORAL
Status: DISCONTINUED | OUTPATIENT
Start: 2019-07-10 | End: 2019-07-16 | Stop reason: HOSPADM

## 2019-07-10 RX ORDER — OLANZAPINE 10 MG/2ML
10 INJECTION, POWDER, FOR SOLUTION INTRAMUSCULAR ONCE
Status: COMPLETED | OUTPATIENT
Start: 2019-07-10 | End: 2019-07-10

## 2019-07-10 RX ADMIN — OLANZAPINE 10 MG: 10 INJECTION, POWDER, FOR SOLUTION INTRAMUSCULAR at 14:29

## 2019-07-10 ASSESSMENT — ENCOUNTER SYMPTOMS
GASTROINTESTINAL NEGATIVE: 1
MUSCULOSKELETAL NEGATIVE: 1
AGITATION: 1
HYPERACTIVE: 0
EYES NEGATIVE: 1
RESPIRATORY NEGATIVE: 1
HALLUCINATIONS: 0
SLEEP DISTURBANCE: 1
CARDIOVASCULAR NEGATIVE: 1
DECREASED CONCENTRATION: 1
NEUROLOGICAL NEGATIVE: 1
ACTIVITY CHANGE: 1

## 2019-07-10 NOTE — PROGRESS NOTES
07/10/19 1803   Patient Belongings   Did you bring any home meds/supplements to the hospital?  Yes   Patient Belongings sent to security per site process   Patient Belongings Put in Hospital Secure Location (Security or Locker, etc.) cell phone/electronics;clothing;purse/wallet;medication(s);glasses;keys;other (see comments)         Patient's Locker:   1. Brown purse   2. Black and white notebook (3)  3. Black Hijab   4. Pair of black shoes   5. Black comb.  6. Miscellaneous Papers  7.Black and white wallet  8. Cracked LG touch screen phone in red case.  9. White  with no head piece  10. ZigZag wraps (1)  11. Black Backpack  12. Fresh mint toothpaste  13. Blue underwear  14. Black prescription glasses.    Sent to Security:   1. [place] Identification Card  2. [facility] Card  3. Single Key  A               Admission:  I am responsible for any personal items that are not sent to the safe or pharmacy.  [facility] is not responsible for loss, theft or damage of any property in my possession.    Signature:  _________________________________ Date: _______  Time: _____                                              Staff Signature:  ____________________________ Date: ________  Time: _____      2nd Staff person, if patient is unable/unwilling to sign:    Signature: ________________________________ Date: ________  Time: _____     Discharge:  [facility] has returned all of my personal belongings:    Signature: _________________________________ Date: ________  Time: _____                                          Staff Signature:  ____________________________ Date: ________  Time: _____
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
## 2019-07-10 NOTE — ED NOTES
Within an hour after restraint an in person face to face assessment was completed at 16:40, including an evaluation of the patient's immediate reaction to the intervention, behavioral assessment and review/assessment of history, drugs and medications, recent labs, etc., and behavioral condition.  The patient experienced: No adverse effects.  The intervention of restraint or seclusion needs to end.       Bunny Kang MD  07/10/19 5282       Bunny Kang MD  07/10/19 8317
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
## 2019-07-10 NOTE — ED NOTES
Patient continuing to yell and swear at staff; yelling threatening comments, continues in 5 point restraints.
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
## 2019-07-10 NOTE — PHARMACY-ADMISSION MEDICATION HISTORY
Admission medication history for the July 10, 2019 admission has been completed by pharmacy.     Interview Sources: Care Everywhere ([facility] clinic records). Did not interview patient due to Code 21.      Reliability of Source: Good     Medication Adherence: Good, injection given in clinic. Unknown adherence regarding benztropine and inhaler but both refilled within the past month.     Current Outpatient Pharmacy: [facility]     Changes made to PTA medication list:  Added: none  Deleted: albuterol (duplicate), prednisone 40mg PO daily x5 days (completed 6/28)  Changed: none    Additional medication history information:   Receives monthly haloperidol decanoate injection at [facility] clinic. Last injection received 6- in left deltoid per Care Everywhere records.    Prior to Admission Medication List:  Prior to Admission Medications   Prescriptions Last Dose Informant  Taking?   albuterol (PROAIR HFA/PROVENTIL HFA/VENTOLIN HFA) 108 (90 Base) MCG/ACT inhaler  Other  Yes   Sig: Inhale 2 puffs into the lungs every 6 hours as needed for shortness of breath / dyspnea or wheezing   benztropine (COGENTIN) 0.5 MG tablet  Other  Yes   Sig: Take 1 tablet (0.5 mg) by mouth 2 times daily   haloperidol decanoate (HALDOL DECANOATE) 100 MG/ML injection 06/17/2019 Other  Yes   Sig: Inject 150 mg into the muscle every 28 days DUE 6/17/19      Facility-Administered Medications: None     Time spent: 15 minutes    Medication history completed by:   Elaina Abreu, PharmD, BCPP  Behavioral Health Pharmacist  [facility] [place]  Available daily from 1-9 PM: phone [phone], ascom *[phone], pager [phone]

## 2019-07-10 NOTE — ED NOTES
Within an hour after restraint an in person face to face assessment was completed at 1443, including an evaluation of the patient's immediate reaction to the intervention, behavioral assessment and review/assessment of history, drugs and medications, recent labs, etc., and behavioral condition.  The patient experienced: No adverse physical outcome from seclusion/restraint initiation.  The intervention of restraint or seclusion needs to continue.     Michele Conklin MD  07/10/19 9253
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
## 2019-07-10 NOTE — ED NOTES
Observed via camera room pt began to escalate; security, RN, Psych Associate tried to de-escalate pt. When doctor entered room pt began to throw furniture. Code 21 called.

## 2019-07-11 ENCOUNTER — APPOINTMENT (OUTPATIENT)
Dept: CT IMAGING | Facility: CLINIC | Age: 29
End: 2019-07-11
Attending: EMERGENCY MEDICINE
Payer: COMMERCIAL

## 2019-07-11 LAB — GLUCOSE BLDC GLUCOMTR-MCNC: 105 MG/DL (ref 70–99)

## 2019-07-11 PROCEDURE — 72131 CT LUMBAR SPINE W/O DYE: CPT

## 2019-07-11 PROCEDURE — 72125 CT NECK SPINE W/O DYE: CPT | Mod: 77

## 2019-07-11 PROCEDURE — 99223 1ST HOSP IP/OBS HIGH 75: CPT | Mod: AI | Performed by: PSYCHIATRY & NEUROLOGY

## 2019-07-11 PROCEDURE — 25000132 ZZH RX MED GY IP 250 OP 250 PS 637: Performed by: NURSE PRACTITIONER

## 2019-07-11 PROCEDURE — 70450 CT HEAD/BRAIN W/O DYE: CPT

## 2019-07-11 PROCEDURE — 12400001 ZZH R&B MH UMMC

## 2019-07-11 PROCEDURE — 72128 CT CHEST SPINE W/O DYE: CPT

## 2019-07-11 PROCEDURE — 00000146 ZZHCL STATISTIC GLUCOSE BY METER IP

## 2019-07-11 PROCEDURE — 70450 CT HEAD/BRAIN W/O DYE: CPT | Mod: 77

## 2019-07-11 PROCEDURE — 25000132 ZZH RX MED GY IP 250 OP 250 PS 637: Performed by: EMERGENCY MEDICINE

## 2019-07-11 PROCEDURE — 72125 CT NECK SPINE W/O DYE: CPT

## 2019-07-11 PROCEDURE — 25000128 H RX IP 250 OP 636: Performed by: PSYCHIATRY & NEUROLOGY

## 2019-07-11 RX ORDER — LORAZEPAM 1 MG/1
1-2 TABLET ORAL EVERY 4 HOURS PRN
Status: DISCONTINUED | OUTPATIENT
Start: 2019-07-11 | End: 2019-07-16 | Stop reason: HOSPADM

## 2019-07-11 RX ORDER — LORAZEPAM 2 MG/ML
1-2 INJECTION INTRAMUSCULAR EVERY 4 HOURS PRN
Status: DISCONTINUED | OUTPATIENT
Start: 2019-07-11 | End: 2019-07-16 | Stop reason: HOSPADM

## 2019-07-11 RX ORDER — LORAZEPAM 1 MG/1
2 TABLET ORAL ONCE
Status: COMPLETED | OUTPATIENT
Start: 2019-07-11 | End: 2019-07-11

## 2019-07-11 RX ORDER — HALOPERIDOL 5 MG/1
5 TABLET ORAL ONCE
Status: COMPLETED | OUTPATIENT
Start: 2019-07-11 | End: 2019-07-11

## 2019-07-11 RX ORDER — DIPHENHYDRAMINE HYDROCHLORIDE 50 MG/ML
50 INJECTION INTRAMUSCULAR; INTRAVENOUS EVERY 4 HOURS PRN
Status: DISCONTINUED | OUTPATIENT
Start: 2019-07-11 | End: 2019-07-16 | Stop reason: HOSPADM

## 2019-07-11 RX ORDER — HALOPERIDOL 5 MG/1
5 TABLET ORAL EVERY 4 HOURS PRN
Status: DISCONTINUED | OUTPATIENT
Start: 2019-07-11 | End: 2019-07-12

## 2019-07-11 RX ORDER — DIPHENHYDRAMINE HCL 50 MG
50 CAPSULE ORAL EVERY 4 HOURS PRN
Status: DISCONTINUED | OUTPATIENT
Start: 2019-07-11 | End: 2019-07-16 | Stop reason: HOSPADM

## 2019-07-11 RX ORDER — BENZTROPINE MESYLATE 0.5 MG/1
0.5 TABLET ORAL 2 TIMES DAILY
Status: DISCONTINUED | OUTPATIENT
Start: 2019-07-11 | End: 2019-07-16 | Stop reason: HOSPADM

## 2019-07-11 RX ORDER — HALOPERIDOL 5 MG/ML
5 INJECTION INTRAMUSCULAR EVERY 4 HOURS PRN
Status: DISCONTINUED | OUTPATIENT
Start: 2019-07-11 | End: 2019-07-12

## 2019-07-11 RX ORDER — DIPHENHYDRAMINE HCL 50 MG
50 CAPSULE ORAL ONCE
Status: COMPLETED | OUTPATIENT
Start: 2019-07-11 | End: 2019-07-11

## 2019-07-11 RX ADMIN — DIPHENHYDRAMINE HYDROCHLORIDE 50 MG: 50 CAPSULE ORAL at 11:24

## 2019-07-11 RX ADMIN — LORAZEPAM 2 MG: 2 INJECTION INTRAMUSCULAR; INTRAVENOUS at 17:16

## 2019-07-11 RX ADMIN — HALOPERIDOL LACTATE 5 MG: 5 INJECTION INTRAMUSCULAR at 17:16

## 2019-07-11 RX ADMIN — LORAZEPAM 2 MG: 1 TABLET ORAL at 11:24

## 2019-07-11 RX ADMIN — HALOPERIDOL 5 MG: 5 TABLET ORAL at 11:24

## 2019-07-11 RX ADMIN — OLANZAPINE 10 MG: 10 TABLET, FILM COATED ORAL at 03:25

## 2019-07-11 RX ADMIN — ACETAMINOPHEN 650 MG: 325 TABLET, FILM COATED ORAL at 19:54

## 2019-07-11 RX ADMIN — DIPHENHYDRAMINE HYDROCHLORIDE 50 MG: 50 INJECTION, SOLUTION INTRAMUSCULAR; INTRAVENOUS at 17:16

## 2019-07-11 ASSESSMENT — ACTIVITIES OF DAILY LIVING (ADL)
HYGIENE/GROOMING: INDEPENDENT
DRESS: SCRUBS (BEHAVIORAL HEALTH)
DRESS: INDEPENDENT
ORAL_HYGIENE: INDEPENDENT
ORAL_HYGIENE: INDEPENDENT
HYGIENE/GROOMING: INDEPENDENT
LAUNDRY: UNABLE TO COMPLETE

## 2019-07-11 NOTE — ED NOTES
Pt is tearful after talking on phone. Pt requested phone number for her . States that she doesn't belong here and that she hasn't done anything wrong. Pt deescalated verbally.  Pt ready for return to inpatient behavioral bed.

## 2019-07-11 NOTE — ED NOTES
Repeat head CT and cervical spine CT completed and results reviewed.  The patient again does not have any acute traumatic injury.  The patient was reassessed.  She is awake and alert.  She does not have any midline neck pain and the cervical collar was removed.  She has normal range of motion without limitation or pain.  Her anterior neck appears normal.  There is no swelling.  Carotid pulses are palpable and normal.  The patient denies any difficulty swallowing and swallows normally on exam.  The patient appears medically stable for psychiatric admission.  The patient will remain in restraints for transport back to station 32     Tushar Rapp MD  07/11/19 3568

## 2019-07-11 NOTE — ED NOTES
Patient placed a cord from her bed around her neck after being transported to room #14.  This was removed and the patient's bed was removed with her room as well.  She was placed in seclusion.  I presented to evaluate the patient and she slammed her head into the plexiglass window 3 times and then had a controlled fall to the floor.  She remains awake and alert.  She will require further sedation so we will repeat head CT and cervical spine CT to ensure no injury.     Tushar Rapp MD  07/11/19 5144

## 2019-07-11 NOTE — ED NOTES
Pt finished breakfast. Talking on phone with person unknown to writer. Pt is getting angry, tearful. Pt was asked to calm down. Pt apologized for getting loud. Now concerned about the location of her glasses. I will locate them and have them delivered to ED.

## 2019-07-11 NOTE — CONSULTS
[facility], [place]  Consult Note - Hospitalist Service     Date of Admission:  7/10/2019  Reason for Consult: possible head trauma, possible seizure versus other event    Assessment & Plan      Ilene Liu is a 29 year old female with a possible seizure after banging here head with significant force this morning.  Possibilities include seizure, head bleed, concussion, versus other.  She is being evaluated in the ED for emergency pathology; and will likely benefit from an additional 1-2 hours of observation if good clinical course and negative imaging; will require further workup if any concerning events or findings.    -CT head  -CT c-spine, T-spine, L-spine  -continue collar for now  -attempt to avoid using sedating medications during evaluation  -close monitoring of clinical course under monitoring by hospitalist, ED nursing for now  -pt is refusing labs x 2.   Will continue to attempt; though do not wish to agitate patient or keep her from getting her imaging, which is likely the most critical.  Asking for lactate, CBC, BMP, INR  -will likely be able to transfer back to psychiatric care early this morning if reassuring clinical course, imaging, with f/u as needed by medicine.  -I will be signing out to my colleague Dominique Maki at 8am  -We appreciate the help of CHAZ Pineda and the ED team in co-evaluating, particularly for their trauma expertise, and evaluation for spinal injuries, etc.    The patient's care was discussed with the pt, outgoing and incoming ED MD Ivan Gonzáles MD  [facility], [place]    ______________________________________________________________________    Chief Complaint   seizure    History of Present Illness   Ilene Liu is a 29 year old female with a reported history of schizophrenia, aggressive behavior, and drug abuse admitted to mental health for whom a code blue was called today after head banging  and a seizure.  Please see my code blue note.  In brief, pt hit head x 3 quite hard, and then seem to have a fall backwards (psych associate not certain whether she protected herself from fall, and seemed to happen quite quickly), and then had a shaking event x 8 minutes or so, and then awoke.    Pt is a limited historian, and at times seems to answer in a way to avoid further questioning.  She does say that she's had seizures before, but denies being on any medications.  A brief search on epic did not reveal any history of this.    Pt somewhat verbally aggressive after event, but did report pain in her neck, and down to the middle of her back over the center, and reported pain with palpation by the ED physician.  No loss of B/B.    Past Medical History:   Diagnosis Date     Asthma      Cocaine abuse      Depressive disorder      Marijuana use      Schizo-affective type schizophrenia          Review of Systems   10 pt ROS negative (unclear reliability)    Past Medical History    I have reviewed this patient's medical history and updated it with pertinent information if needed.   Past Medical History:   Diagnosis Date     Asthma      Cocaine abuse      Depressive disorder      Marijuana use      Schizo-affective type schizophrenia        Past Surgical History   I have reviewed this patient's surgical history and updated it with pertinent information if needed.  Past Surgical History:   Procedure Laterality Date     ESOPHAGOSCOPY, GASTROSCOPY, DUODENOSCOPY (EGD), COMBINED N/A 12/17/2018    Procedure: COMBINED ESOPHAGOSCOPY, GASTROSCOPY, DUODENOSCOPY (EGD);  Surgeon: Cassie Rahman MD;  Location:  GI       Social History   I have reviewed this patient's social history and updated it with pertinent information if needed.  Social History     Tobacco Use     Smoking status: Current Every Day Smoker     Smokeless tobacco: Never Used   Substance Use Topics     Alcohol use: Yes     Drug use: Never     Types: Marijuana,  "\"Crack\" cocaine       Family History   I have reviewed this patient's family history and updated it with pertinent information if needed.   Denies seizures in family    Medications   Current Facility-Administered Medications   Medication     acetaminophen (TYLENOL) tablet 650 mg     albuterol (PROAIR HFA/PROVENTIL HFA/VENTOLIN HFA) 108 (90 Base) MCG/ACT inhaler 2 puff     alum & mag hydroxide-simethicone (MYLANTA ES/MAALOX  ES) suspension 30 mL     bisacodyl (DULCOLAX) Suppository 10 mg     diphenhydrAMINE (BENADRYL) capsule 50 mg    Or     diphenhydrAMINE (BENADRYL) injection 50 mg     haloperidol (HALDOL) tablet 5 mg    Or     haloperidol lactate (HALDOL) injection 5 mg     hydrOXYzine (ATARAX) tablet 25 mg     LORazepam (ATIVAN) tablet 1-2 mg    Or     LORazepam (ATIVAN) injection 1-2 mg     magnesium hydroxide (MILK OF MAGNESIA) suspension 30 mL     OLANZapine (zyPREXA) tablet 10 mg    Or     OLANZapine (zyPREXA) injection 10 mg     traZODone (DESYREL) tablet 50 mg     Current Outpatient Medications   Medication Sig     haloperidol decanoate (HALDOL DECANOATE) 100 MG/ML injection Inject 150 mg into the muscle every 28 days DUE 6/17/19     albuterol (PROAIR HFA/PROVENTIL HFA/VENTOLIN HFA) 108 (90 Base) MCG/ACT inhaler Inhale 2 puffs into the lungs every 6 hours as needed for shortness of breath / dyspnea or wheezing     benztropine (COGENTIN) 0.5 MG tablet Take 1 tablet (0.5 mg) by mouth 2 times daily       Allergies   No Known Allergies    Physical Exam   Vital Signs: Temp: 96.3  F (35.7  C) Temp src: Oral BP: (!) 138/91 Pulse: 74 Heart Rate: 77 Resp: 22 SpO2: 99 % O2 Device: None (Room air)    Weight: 0 lbs 0 oz    BP (!) 138/91   Pulse 74   Temp 96.3  F (35.7  C) (Oral)   Resp 22   SpO2 99%   Gen: anxious, agitated, but generally cooperative.  HEENT: Swelling on center of forehead.  No swelling, bumps on back of skull.    MMM.   Pt reports tenderness generally when palpating all over C spine, and top of L " spine  CV: RRR, no r/g/m  Neuro: CNII, III, VI intact.  Refuses to smile.  Shrugs shoulders with normal strength.  Sticks tongue out, midline.  Nl and equal  strength, lifts arms with grossly nl strength, nl strength with hip flexion bilaterally.      Data   Results for orders placed or performed during the hospital encounter of 07/10/19 (from the past 24 hour(s))   Glucose by meter   Result Value Ref Range    Glucose 105 (H) 70 - 99 mg/dL

## 2019-07-11 NOTE — CODE/RAPID RESPONSE
Code Blue Note/ focused consult note    S:    Code blue called at 5:40 this am.   Arrived appx 5:44am to patient that appeared to be seizing with eye fluttering.   Glc 105 Focused history from psych associate was that patient had been aggressive, and so was in seclusion, and had been banging head on door; fell, and then began shaking.    Later henry history was that patient had hit head x 3 quite hard, and then fell down, seeming to land on her shoulder (not unprotected fall to back of head; though happened quickly).    Pt had eye fluttering, no loss of B/B for several further minutes; attempts to arouse patient not successful. Nasal versed was being prepared but not given prior to her awakening suddenly after about 8 minutes total.  Pt awakened over a period of about 5 seconds, then quite upset, yelling to get the IV out of her arm, etc.  Patient asked to put neck collar on, and refused for some time.   Team attempted to keep pt from moving neck during discussions.    Pt refused cranial nerve exam, covering mouth/ face.  Did allow eye exam, CNII, III, VI in infact, nl shoulder shrug.  Refused other exam.  Allow neck brace to be placed.  Transported to ED without further incident.  Seemed to be at baseline mental status during transport.  See my focused consult note for further notes.    Ivan Gonzáles MD  Med-Peds Hospitalist

## 2019-07-11 NOTE — ED NOTES
Patient became agitated, threw Cooney stand, and pushed casting cart up against the door when staff member attempted to assist her to the bathroom.  Patient witnessed by myself to grab a blunt scissors and strike herself in the abdomen 3 times and the right side of her neck twice.  Her room was immediately entered by myself and the charge nurse at which time the patient immediately dropped the scissors and sat on the cart and allowed an assessment.  She has a small superficial abrasion of the right side of her neck without significant laceration.  Her carotid pulse is normal.  She denies any difficulty swallowing.  She is breathing normally.  Her abdomen is soft and nontender.  There are no cutaneous injuries of the abdomen.  A few minutes later while being physically restrained, the patient began to flutter her eyes and would not verbalize.  I reassessed her at this time.  She was able to sit up on her own volition upon command.  She fluttered for a few seconds and then immediately requested to talk to her family.  The patient did not experience any cyanosis or extremity movements.  Spell most consistent with a pseudoseizure.  Oral Haldol, Benadryl, and Ativan ordered as patient agreed to take medications orally.     Tushar Rapp MD  07/11/19 1120       Tushar Rapp MD  07/11/19 1122

## 2019-07-11 NOTE — PROGRESS NOTES
"   07/11/19 0325   Restraint Monitoring Q15 Minutes   Psychological Status YE;PO;PA;O   Physical Comfort Other  (no complaint or indication of phsyical discomfort)   Circulation NS   Continuous Observation Yes   Restraint Type   Seclusion (BH) Start   pt urinated at door.  \"I want to die\" \"When I get out of her [place]hospital[place]  Pt began to bang her head on the window.  When it was explained that that would lead to five point restraints for safety reason, pt promised to stop.  "

## 2019-07-11 NOTE — PROGRESS NOTES
Pt came to unit in restraints and on a 2:1. CTC later went to pt's room to interview her; she was out of restraints but sleeping.

## 2019-07-11 NOTE — SIGNIFICANT EVENT
While patient was in locked seclusion, she came to the window of the seclusion room door and asked attendant when she would be leaving the hospital. When attendant responded that she did not know, patient screamed and quickly banged her head against the glass of the door. She immediately fell backwards and was motionless. Attendant pressed the duress beeper and called down the hallway for assistance. A second staff arrived and noticed that the patient was seizing. Attendant asked for assistance entering the seclusion room and entered with multiple staff. Patient was seizing and was moved from lying on her back to lying on her side to reduce aspiration risk. A code blue was called and code staff arrived to help. The seizure lasted upwards of 6 minutes with no response from the patient. Vitals were stable and patient came back to consciousness while blood was being drawn for tests, which she objected to. She was very scared and upset and wanted everyone to leave. She was somewhat resistant to examination by the provider but eventually calmed down enough to follow a light with her eyes and respond to other requests from the provider. Patient then said she just wanted to sleep and rolled over and pulled her shirt over her eyes. At this point she was calm and stable. She agreed to walk to her room with staff where she laid down. She was resistant to wearing the cervical brace at first but eventually complied and was transported to the ED via gurney to be assessed for injury.

## 2019-07-11 NOTE — ED NOTES
Patient presents to the emergency department from the psychiatric unit after a code 21 and CODE BLUE.  Per report patient was in the room and was banging her head for a total of 3 times and then she fell backwards hitting her head on the floor.  Patient lost consciousness and concern for seizure-like symptoms with fluttering arise and decreased responsiveness.  CODE BLUE was called, patient was hemodynamically stable vital signs wnl /84, RR: 18, HR 83 bpm, 96% on RA.  Patient woke up after attempt for IV access/needle pole, patient restless,, screaming, yelling at staff, refusing IV.  At this time patient complaining of some neck and upper back pain.  Patient brought to the emergency department for further evaluation.    Upon arrival patient is awake, alert, no distress.  Patient was placed on a cervical spine collar prior to arrival.  Cranial nerves II through XII intact with no focal motor, sensory, speech deficit.  Regular rate and rhythm, lungs clear to auscultation bilaterally with no wheezing, rhonchi, rales, no obvious evidence of trauma.  Patient with some tenderness to palpation over her anterior frontal forehead, cervical collar in place, patient with midline cervical spine tenderness to palpation midline upper thoracic tenderness to palpation with no step-offs, deformities, no evidence of urinary incontinence, no tongue lacerations.  At this point I discussed with the hospitalist who is at the bedside as well and will obtain CT scan of the head, cervical spine, thoracic spine, lumbar spine, and reevaluate.    If patient maintains normal mental status and remains at baseline with unremarkable imaging likely medically cleared to go back to inpatient psychiatric unit.  Patient signed out to morning provider who will follow up with hospitalist team.     Lora Pineda MD  07/11/19 1332
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
## 2019-07-11 NOTE — PROGRESS NOTES
Brief Medicine Note:    IM received handoff from night physician regarding transfer to the ED due to seizure following self-inflicted head trauma. Patient examined in the ED around 0930. HR RRR, S1, S2. Lungs CTAB without wheezing or rales. CN II-XII intact. Normal gross sensation. BUE strength 5/5. She was medically cleared for transfer back the [facility]. Plan d/w psychiatry who stated she was not appropriate for station 32 but that a bed was available for her on station 12.     Unfortunately, patient psychiatrically decompensated later in the morning prompting code 21 x2 in the ED. Please see Dr. Rapp's notes for details. Prior to transfer back to psychiatry, patient was again cleared by ED with repeat head and cervical spine CT. Medicine will sign off at this time. Please contact with any clinical changes, further injury or any questions    Anjali Maki PA-C  Internal Medicine [facility]  [phone]

## 2019-07-11 NOTE — PROVIDER NOTIFICATION
07/11/19 0515   Debriefing   Debriefing   (Debriefing not done due to code blue.)    Pt went to ED for further assessment.

## 2019-07-11 NOTE — PROGRESS NOTES
Pt was on seclusion on 1:1 and was heard banging her head on the door  0515 . Writer went to check on pt. Pt was observed having a seizure, face up . Code blue called. Pt transported to ED for further assessment. Debriefing was not done due to Code Blue. Will continue to monitor.

## 2019-07-11 NOTE — ED NOTES
At approx 1040 Pt had requested to use bathroom. Commode brought to bedside. Pt escalated, tossed commode. PA exited room, closed room door and requested help. Pt attempted to barricade door with airway cart. Pt pulled open sealed drawer, grabbed blunt scissors and began striking herself in abdomen. Pt also threw the iStat onto the floor after banging it on the glass door. MD and I entered the room. Pt dropped the scissors and we took control of the patient laying her on cart for eval by MD. MD examined patient. Patient had abrasion on R side of neck, no other injuries. Arrangement made by ED charge nurse to move patient to room 14.   1124 pt given benadryl 50mg po, Ativan 2mg po, Haldol 5mg po. Pt started to remove small rubber bands (x2) from hair santiago and swallowing them. Pt was stopped from doing so and moved via wheelchair and  to room 14. Pt was calm and cooperative.   1135 Pt transferred to room 14. Code green initiated for search assistance. Pt was searched by female , remaining rubber bands were removed.   1145 Pt on ED cart in room 14 with 1:1 attendant. Pt walked around the cart several times then bent down, grabbed retracted plug in cord from bed and tied cord around her neck. Two PAs entered the room and took control of the patient. I entered the room and assisted the cord's removal from the pt. Pt was talking the entire time cord was around her neck, airway was apparently patent. MD called for evaluation. Bed was removed from room, floor mat placed, pt placed on seclusion. As MD was about to enter the room pt began banging her head on the plexiglass door window. Pt had self controlled fall to floor. Upon entering the room a c-collar was placed, code Nikki called. Pt placed on backboard restraints on her back as was necessary for CT exam.   1200 Code team and I took patient to CT. Pt is calm during entire exam. We returned to ED room 14 to await CT results.   1255 Code team  and I brought pt to [address] [facility]. Hand-off report to [place].

## 2019-07-11 NOTE — SIGNIFICANT EVENT
While Patient was in locked seclusion, she came to the window of the seclusion room door and asked attendant when she would be leaving the hospital. When attendant responded that she did not know, patient screamed and quickly banged her head with great force against the glass of the door. She immediately fell backwards and was motionless. Attendant pressed the duress beeper and called into the hallway for assistance. A second staff arrived and noticed that the patient was seizing. Attendant asked for assistance entering the seclusion room and entered with multiple staff. Patient was seizing and was moved from lying on her back to lying on her side to minimize aspiration risk. A code blue was called and code staff arrived to help. The seizure lasted upwards of 6 minutes with no response from the patient. Vitals were stable and patient came back to consciousness while blood was being drawn for tests, which she objected to. She was very scared and upset and wanted everyone to leave. She was somewhat resistant to examination by the provider but eventually calmed down enough to follow a light with her eyes and respond to other requests from the provider. Patient then said she just wanted to sleep and rolled over and pulled her shirt over her eyes. At this point she was calm and stable. She agreed to walk to her room with staff where she laid down. She was resistant to the cervical brace but eventually complied and was transported to the ED via gurney to be assessed for injury.
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
## 2019-07-11 NOTE — PROVIDER NOTIFICATION
07/11/19 0420   Seclusion or Restraint Order   In Person Face to Face Assessment Conducted Yes-Eval of pt's immediate situation, reaction to intervention, complete review of systems assessment, behavioral assessment & review/assessment of hx, drugs & meds, recent labs, etc, behavioral condition, need to continue/terminate restraint/seclusion   Patient Experienced   (Pt reports head hurt from banging on the door.)   Continuation of Seclus/Restraint indicated at this time Yes   Rn reviewed pt chart including review MAR, PRN medications given in the last 3 days, current  Pt scheduled medications, vitals, medical history. Pt had no physiological abnormalities that could have lead to the aggressive behavior and seclusion. The Assessment have been discussed with the on-call NP Arlet Mensah.

## 2019-07-11 NOTE — ED NOTES
At 0630 am, patient transferred to ED, hooked to cardiac monitor, patient was again asked to participate in the IV insertion but patient still refuse. MD talked to patient about the procedures, pt agreeable to CT scan.

## 2019-07-11 NOTE — ED NOTES
Within an hour after restraint an in person face to face assessment was completed at 1245, including an evaluation of the patient's immediate reaction to the intervention, behavioral assessment and review/assessment of history, drugs and medications, recent labs, etc., and behavioral condition.  The patient experienced: No adverse physical outcome from seclusion/restraint initiation.  The intervention of restraint or seclusion needs to continue.     Tushar Rapp MD  07/11/19 2219

## 2019-07-12 ENCOUNTER — APPOINTMENT (OUTPATIENT)
Dept: CT IMAGING | Facility: CLINIC | Age: 29
End: 2019-07-12
Attending: PHYSICIAN ASSISTANT
Payer: COMMERCIAL

## 2019-07-12 ENCOUNTER — APPOINTMENT (OUTPATIENT)
Dept: GENERAL RADIOLOGY | Facility: CLINIC | Age: 29
End: 2019-07-12
Attending: PHYSICIAN ASSISTANT
Payer: COMMERCIAL

## 2019-07-12 PROCEDURE — 25000132 ZZH RX MED GY IP 250 OP 250 PS 637: Performed by: PSYCHIATRY & NEUROLOGY

## 2019-07-12 PROCEDURE — 72125 CT NECK SPINE W/O DYE: CPT

## 2019-07-12 PROCEDURE — 99207 ZZC CDG-MDM COMPONENT: MEETS LOW - DOWN CODED: CPT | Performed by: PHYSICIAN ASSISTANT

## 2019-07-12 PROCEDURE — 25000128 H RX IP 250 OP 636: Performed by: PSYCHIATRY & NEUROLOGY

## 2019-07-12 PROCEDURE — 73090 X-RAY EXAM OF FOREARM: CPT | Mod: 50

## 2019-07-12 PROCEDURE — 99233 SBSQ HOSP IP/OBS HIGH 50: CPT | Performed by: PSYCHIATRY & NEUROLOGY

## 2019-07-12 PROCEDURE — 12400001 ZZH R&B MH UMMC

## 2019-07-12 PROCEDURE — 73060 X-RAY EXAM OF HUMERUS: CPT

## 2019-07-12 PROCEDURE — 70450 CT HEAD/BRAIN W/O DYE: CPT

## 2019-07-12 PROCEDURE — 99232 SBSQ HOSP IP/OBS MODERATE 35: CPT | Performed by: PHYSICIAN ASSISTANT

## 2019-07-12 PROCEDURE — 73120 X-RAY EXAM OF HAND: CPT | Mod: 50

## 2019-07-12 RX ORDER — LORAZEPAM 2 MG/1
2 TABLET ORAL ONCE
Status: DISCONTINUED | OUTPATIENT
Start: 2019-07-12 | End: 2019-07-16 | Stop reason: HOSPADM

## 2019-07-12 RX ORDER — HALOPERIDOL 5 MG/ML
5 INJECTION INTRAMUSCULAR 2 TIMES DAILY
Status: DISCONTINUED | OUTPATIENT
Start: 2019-07-12 | End: 2019-07-16 | Stop reason: HOSPADM

## 2019-07-12 RX ORDER — HYDROXYZINE HYDROCHLORIDE 25 MG/1
25-50 TABLET, FILM COATED ORAL EVERY 4 HOURS PRN
Status: DISCONTINUED | OUTPATIENT
Start: 2019-07-12 | End: 2019-07-16 | Stop reason: HOSPADM

## 2019-07-12 RX ORDER — HALOPERIDOL DECANOATE 100 MG/ML
200 INJECTION INTRAMUSCULAR
Status: DISCONTINUED | OUTPATIENT
Start: 2019-07-15 | End: 2019-07-16 | Stop reason: HOSPADM

## 2019-07-12 RX ORDER — HALOPERIDOL 5 MG/1
5 TABLET ORAL 2 TIMES DAILY PRN
Status: DISCONTINUED | OUTPATIENT
Start: 2019-07-12 | End: 2019-07-16 | Stop reason: HOSPADM

## 2019-07-12 RX ORDER — HALOPERIDOL 5 MG/1
5 TABLET ORAL 2 TIMES DAILY
Status: DISCONTINUED | OUTPATIENT
Start: 2019-07-12 | End: 2019-07-16 | Stop reason: HOSPADM

## 2019-07-12 RX ORDER — HALOPERIDOL 5 MG/ML
5 INJECTION INTRAMUSCULAR 2 TIMES DAILY PRN
Status: DISCONTINUED | OUTPATIENT
Start: 2019-07-12 | End: 2019-07-16 | Stop reason: HOSPADM

## 2019-07-12 RX ADMIN — DIPHENHYDRAMINE HYDROCHLORIDE 50 MG: 50 INJECTION, SOLUTION INTRAMUSCULAR; INTRAVENOUS at 09:56

## 2019-07-12 RX ADMIN — NICOTINE POLACRILEX 8 MG: 4 GUM, CHEWING ORAL at 16:56

## 2019-07-12 RX ADMIN — HALOPERIDOL LACTATE 5 MG: 5 INJECTION INTRAMUSCULAR at 09:56

## 2019-07-12 RX ADMIN — HALOPERIDOL 5 MG: 5 TABLET ORAL at 21:31

## 2019-07-12 RX ADMIN — BENZTROPINE MESYLATE 0.5 MG: 0.5 TABLET ORAL at 21:31

## 2019-07-12 RX ADMIN — LORAZEPAM 1 MG: 1 TABLET ORAL at 11:00

## 2019-07-12 RX ADMIN — LORAZEPAM 2 MG: 2 INJECTION INTRAMUSCULAR; INTRAVENOUS at 09:56

## 2019-07-12 ASSESSMENT — ACTIVITIES OF DAILY LIVING (ADL)
HYGIENE/GROOMING: INDEPENDENT
DRESS: SCRUBS (BEHAVIORAL HEALTH)
HYGIENE/GROOMING: INDEPENDENT
ORAL_HYGIENE: INDEPENDENT
ORAL_HYGIENE: INDEPENDENT
DRESS: SCRUBS (BEHAVIORAL HEALTH)

## 2019-07-12 NOTE — PROVIDER NOTIFICATION
07/12/19 1035   Restraint Type   Seclusion () Start   Wrist - R (BH) Start   Wrist - L (BH) Start   Ankle - R (BH) Start   Ankle - L (BH) Start   Chest () Start   Started five points in seclusion room

## 2019-07-12 NOTE — PROGRESS NOTES
Patient off unit in restraints with security, 2 psych associates and RN flyer to go to radiology for CT scan after serious head banging. Patient agreeable at this time. One time dose of 2mg Ativan given.

## 2019-07-12 NOTE — PROVIDER NOTIFICATION
07/12/19 1020   Seclusion or Restraint Order   In Person Face to Face Assessment Conducted Yes-Eval of pt's immediate situation, reaction to intervention, complete review of systems assessment, behavioral assessment & review/assessment of hx, drugs & meds, recent labs, etc, behavioral condition, need to continue/terminate restraint/seclusion   Patient Experienced Physical sequelae;Further follow-up is needed   Describe physical sequela  Complainigng of pain in forehead and dizziness   Describe follow-up needed Examined by RN, Internal medicine was contacted. Examined by IM   Continuation of Seclus/Restraint indicated at this time Yes     Patients face to face assessment complete. Reviewed most recent medication history, laboratory findings, and progress notes. Current condition and  behavioral situation was discussed with attending provider. Patient  experience physical sequelae not related to seclusion/restraint initiation, however related to events leading to restraint. Internal medicine was contacted. Dicussed events leading to restraint initiation and current presenting symptoms

## 2019-07-12 NOTE — PLAN OF CARE
Problem: OT General Care Plan  Goal: OT Goal 1  Description  Will attend OT groups improve concentration and comfort with engaging in more structured and success oriented options when able.   Note:   Pt has not attended OT groups yet. They will be encouraged to attend groups when able and participate in a goal oriented focus.
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
## 2019-07-12 NOTE — PROVIDER NOTIFICATION
07/12/19 1205   Debriefing   Debriefing DO   Does patient understand why the event happened? Yes   Does patient agree to safe behaviors? Yes   What can we do differently so this doesn't happen again? Other (comment)  (safe behavior)   Plan of care reviewed and modified Yes     Patient was able to be safe when going to CT with assistance of staff members and security. Patient agrees to safe behaviors. Acknowledges an understanding of what she can do differently next time. Out of restraints and in room safely at 1205. Remains on 2:1.
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
## 2019-07-12 NOTE — PROVIDER NOTIFICATION
07/12/19 1000   Justification   Clinical Justification Self     Patient asked for linens and phone to call family. When explained that patient was not able to have those things right now, patient immediately began head banging. Patient was hitting herself so hard that staff immediately opened door and went hands on, darling pager was pressed with code 21 called. Patient began screaming and fighting against staff. Due to serious self harm attempts patient was placed in 5 point restraints. Patient refused PO medications, IM benadryl, haldol and ativan given. Internal medicine did come and assess patient and she is to have a CT scan. VSS taken and are WNL.

## 2019-07-12 NOTE — PROVIDER NOTIFICATION
Patient spat on multiple staff during 5 point restraint process.  She showed no evidence of learning although phone restriction rationale was explained to her multiple times.  Order was obtained for no outgoing phone calls until provider can assess patient tomorrow.  Patient continued to scream profane and vulgar remarks intermittently during restraint period while continuing to spit at staff.  Discontinuation criteria were explained but patient was unable to modify her behavior until told she would be transferred to [facility].  Patient remained in restraints during transfer as she did not display ability to follow directions and cooperate.

## 2019-07-12 NOTE — PROVIDER NOTIFICATION
07/12/19 1045   Seclusion or Restraint Order   In Person Face to Face Assessment Conducted Yes-Eval of pt's immediate situation, reaction to intervention, complete review of systems assessment, behavioral assessment & review/assessment of hx, drugs & meds, recent labs, etc, behavioral condition, need to continue/terminate restraint/seclusion   Patient Experienced No adverse physical outcome from seclusion/restraint initiation   Describe physical sequela  complaining of paiin   Describe follow-up needed Examined by internal medicine   Continuation of Seclus/Restraint indicated at this time Yes   Face to face assessment completed. . Internal medicine met and reassess patient. CT and X-ray ordered. Pls, see progress noted on previous face to face dated 7/12/19 @ 10:55 am.
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
## 2019-07-12 NOTE — PROGRESS NOTES
"   07/12/19 1523   Significant Event   Significant Event Other (see comments)  (shift summary)   Pt had a difficult shift. She had a severe episode of SIB in which she deliberately banged her head against the wall. Pt was extensively attended to and checked out medically. Pt received a CT scan. Pt had no episodes of aggression after returning from CT scan. She was anxious and impatient, constantly asking to see the doctor, or for staff to call her OP CM. When finally meeting with the doctor pt showed short-term understanding of doctor's recommendations, intermittently agreeing and then arguing about the same recommendation (re: medication, hospital stay, disposition plan) When arguing pt became anxious and stated \"I don't need that! My tantrums are my choice! I won't do any more of those! I'm a woman of my word!\" Overall pt displayed a very low frustration tolerance and was impulsive in reacting to negatively perceived interactions/stimuli.  "

## 2019-07-12 NOTE — PROGRESS NOTES
"Internal Medicine Progress Note      Assessment and plan:  Asked to see patient due to intentional head trauma this morning    Intentional head trauma: See notes 7/11 for details of prior events. Repeatedly hit back of head against her door in attempt to harm self. Per nursing, fell limp to the floor and passed out for 20 seconds. Medicine then called to assess patient. Exam as below difficult to interpret as writer cannot tell if patient refuses to answer orientation questions or if she could be altered. Case d/w both Dr. Siu and ED physician. Per ED, no indication to transfer to ED for trauma workup at this time  - Stat head and c-spine CT  - XR both arms  - Please contact medicine with any clinical changes or concerns   - Spoke with Dr. Siu again and asked that he assess patient  **Addendum: Head and c-spine CT negative. XR arms no acute findings. Visited with patient again around 1330, patient denies issues. Her sweatshirt is pulled over her head and she does not pull it down to speak with writer. Oriented to person and year. Does not answer orientation to place question. Declines further exam at this time  - Given negative workup and medical stability, medicine will sign off. Please contact with future questions or concerns     Case d/w nursing, Dr. Allen of psychiatry, Dr. Siu and ED physician (unsure of name)      Objective:  /85   Pulse 88   Temp 97.1  F (36.2  C) (Oral)   Resp 22   SpO2 99%     Vitals signs reviewed and noted    GENERAL: Alert, states \"I don't know\" to all orientation questions. Patient in 5 point restraints and prone with 4 staff members around her. Calm appearing  HEENT: Anicteric sclera. Mucous membranes moist. PERRLA  CV: Unable to perform  RESPIRATORY Unable to perform   GI: Unable to perform  NEUROLOGICAL: Difficult to perform comprehensive exam given prone/restraints. No gross focal changes. Gross sensation intact. PERRLA. Speech logical and clear. Moves " "all extremities   MSK: Moves feet. Hand strength equal, 4/5. Large 4 cm confusion on the forehead, non-tender  SKIN: No jaundice, no rash noted    Pertinent labs and procedures were reviewed     Subjective:   Patient reports feeling \"fine.\" She hit head in attempt harm self. Per nursing, hit back of head several times on her door. She then appeared to pass out for approximately 20 seconds then woke up. During this time, code 21 was called. Medicine called once patient in restraints. Patient reports seeing black spots. She thinks she broke her arm but does not comment as to which arm. Other information limited as patient non-complaint with answers     Anjali Maki PA-C  Internal Medicine  [phone]      "
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
## 2019-07-12 NOTE — PROVIDER NOTIFICATION
07/11/19 1930   Debriefing   Debriefing DO   Does patient understand why the event happened? Yes   Does patient agree to safe behaviors? Yes   What can we do differently so this doesn't happen again? Other (comment)  (talk to staff)   Plan of care reviewed and modified Yes  (SIO, space restriction, limited access to items)     Patient arrived to Station 12 at approx 1930 via gurney.  Per code team, patient did not struggle, or resist during the transfer.  Writer spoke with the patient and she agreed to safe behaviors.  Writer assessed patient as no longer a threat to others.  Backboard restraints were discontinued.      Patient was given her supper, Tylenol for a headache, and she used the restroom.

## 2019-07-12 NOTE — PROGRESS NOTES
Pt remains in 5-point restraints. Pt complaining of pain in right wrist. Restraint loosened and Pt allowed to rotate wrist. Pt reported pain relief after restraint loosened. 5 point restraint continues.

## 2019-07-13 PROCEDURE — 25000132 ZZH RX MED GY IP 250 OP 250 PS 637: Performed by: PSYCHIATRY & NEUROLOGY

## 2019-07-13 PROCEDURE — 12400001 ZZH R&B MH UMMC

## 2019-07-13 RX ADMIN — LORAZEPAM 2 MG: 1 TABLET ORAL at 19:18

## 2019-07-13 RX ADMIN — NICOTINE POLACRILEX 8 MG: 4 GUM, CHEWING ORAL at 19:18

## 2019-07-13 RX ADMIN — HALOPERIDOL 5 MG: 5 TABLET ORAL at 09:04

## 2019-07-13 RX ADMIN — DIPHENHYDRAMINE HYDROCHLORIDE 50 MG: 50 CAPSULE ORAL at 19:18

## 2019-07-13 RX ADMIN — HALOPERIDOL 5 MG: 5 TABLET ORAL at 19:18

## 2019-07-13 ASSESSMENT — ACTIVITIES OF DAILY LIVING (ADL)
LAUNDRY: UNABLE TO COMPLETE
DRESS: SCRUBS (BEHAVIORAL HEALTH)
ORAL_HYGIENE: INDEPENDENT
HYGIENE/GROOMING: INDEPENDENT
HYGIENE/GROOMING: INDEPENDENT
ORAL_HYGIENE: INDEPENDENT
DRESS: SCRUBS (BEHAVIORAL HEALTH);INDEPENDENT

## 2019-07-13 NOTE — PROGRESS NOTES
During my attempted visit, pt declined the visit according the pt. s nurse.    No plan for follow-up but available by requested.

## 2019-07-13 NOTE — PROGRESS NOTES
Patient had an improved evening shift.  She was able to make phone calls in the lounge to her  and .  She showered.      Patient was given 2 towels in order to complete her daily prayers and promote her spiritual health.  Patient was able to use the towels appropriately and return them to staff.  Spiritual health consult placed per her request.    Patient was compliant with her Aranda medication.
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
## 2019-07-13 NOTE — PROGRESS NOTES
Pt denied thoughts of SI/SIB. Pt denied having any hallucinations. Pt made multiple statements about wanting to leave the unit. Pt ate 100% of her dinner. Pt was allowed to come out of her room and make multiple phone calls. Pt showered in the evening.     07/12/19 0453   Behavioral Health   Hallucinations denies / not responding to hallucinations   Thinking paranoid   Orientation person: oriented;place: oriented;time: oriented   Memory short term;temporary   Insight poor   Judgement impaired   Eye Contact at examiner   Affect tense;irritable   Mood elated;anxious;labile   Physical Appearance/Attire untidy;attire appropriate to age and situation   Hygiene well groomed  (showered in the evening)   1. Wish to be Dead No   2. Non-Specific Active Suicidal Thoughts  No   Elopement   (made statements about leaving)   Activity hyperactive (agitated, impulsive)   Speech pressured;clear;coherent   Activities of Daily Living   Hygiene/Grooming independent   Oral Hygiene independent   Dress scrubs (behavioral health)   Room Organization independent

## 2019-07-13 NOTE — PROGRESS NOTES
"RN writer provided Ilene with a blanket this afternoon, as she has been able to refrain from any aggressive behaviors for a period of 24 hours, consistent with the following patient care order: \"Patient may have a thick blanket if she has 24 hours free of agitation and SIB\".    Patient asked about getting her \"head scarf\" back as well.  RN writer informed Ilene that while we do everything possible to provide culturally competent care in the hospital, we are balancing this important patient care initiative with maintaining safety on the unit.  lIene was encouraged to continue to demonstrate safe behaviors on the unit, as the treatment team will likely be comfortable reducing Ilene's current level of restrictions should she be able to demonstrate ongoing stability and self control of her aggressive and impulsive behaviors.    Ilene requested access to a toothbrush and toothpaste to attend to personal hygiene maintenance.  Since she has a history of swallowing foreign objects, RN writer remained in close proximity and directly monitored Ilene while she brushed her teeth.  Ilene was able to carry out this task without incident and returned these restricted items, toothbrush and toothpaste, to RN writer upon task completion.    Ilene demonstrated more engagement with RN writer over the balance of this shift.  She is hopeful and future-oriented.  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  Ilene is hoping to obtain discharge to a homeless shelter.  She reports familiarity with the shelter system and feels that she can navigate this system safely.  She would like to eventually get into an apartment, but she is aware that this is a process.  She notes that she will not cooperate with discharge to any type of a \"group home\", noting that she will \"run\" " upon arrival at such a facility.  She continues on elopement precautions.  Will continue to monitor closely.

## 2019-07-14 LAB
AMPHETAMINES UR QL SCN: NEGATIVE
BARBITURATES UR QL: NEGATIVE
BENZODIAZ UR QL: NEGATIVE
CANNABINOIDS UR QL SCN: POSITIVE
COCAINE UR QL: POSITIVE
ETHANOL UR QL SCN: NEGATIVE
HCG UR QL: NEGATIVE
OPIATES UR QL SCN: NEGATIVE
PCP UR QL SCN: POSITIVE

## 2019-07-14 PROCEDURE — 25000132 ZZH RX MED GY IP 250 OP 250 PS 637: Performed by: NURSE PRACTITIONER

## 2019-07-14 PROCEDURE — 80320 DRUG SCREEN QUANTALCOHOLS: CPT | Performed by: NURSE PRACTITIONER

## 2019-07-14 PROCEDURE — 81025 URINE PREGNANCY TEST: CPT | Performed by: NURSE PRACTITIONER

## 2019-07-14 PROCEDURE — 25000132 ZZH RX MED GY IP 250 OP 250 PS 637: Performed by: PSYCHIATRY & NEUROLOGY

## 2019-07-14 PROCEDURE — 80307 DRUG TEST PRSMV CHEM ANLYZR: CPT | Performed by: NURSE PRACTITIONER

## 2019-07-14 PROCEDURE — 12400001 ZZH R&B MH UMMC

## 2019-07-14 RX ADMIN — DIPHENHYDRAMINE HYDROCHLORIDE 50 MG: 50 CAPSULE ORAL at 19:04

## 2019-07-14 RX ADMIN — HYDROXYZINE HYDROCHLORIDE 50 MG: 25 TABLET ORAL at 21:21

## 2019-07-14 RX ADMIN — ALUMINUM HYDROXIDE, MAGNESIUM HYDROXIDE, AND DIMETHICONE 30 ML: 400; 400; 40 SUSPENSION ORAL at 09:11

## 2019-07-14 RX ADMIN — NICOTINE POLACRILEX 8 MG: 4 GUM, CHEWING ORAL at 19:05

## 2019-07-14 RX ADMIN — LORAZEPAM 2 MG: 1 TABLET ORAL at 19:04

## 2019-07-14 RX ADMIN — LORAZEPAM 2 MG: 1 TABLET ORAL at 10:40

## 2019-07-14 RX ADMIN — DIPHENHYDRAMINE HYDROCHLORIDE 50 MG: 50 CAPSULE ORAL at 10:39

## 2019-07-14 RX ADMIN — HALOPERIDOL 5 MG: 5 TABLET ORAL at 10:40

## 2019-07-14 RX ADMIN — TRAZODONE HYDROCHLORIDE 50 MG: 50 TABLET ORAL at 21:21

## 2019-07-14 RX ADMIN — HALOPERIDOL 5 MG: 5 TABLET ORAL at 19:04

## 2019-07-14 RX ADMIN — HALOPERIDOL 5 MG: 5 TABLET ORAL at 08:30

## 2019-07-14 ASSESSMENT — ACTIVITIES OF DAILY LIVING (ADL)
ORAL_HYGIENE: INDEPENDENT
DRESS: SCRUBS (BEHAVIORAL HEALTH)
HYGIENE/GROOMING: INDEPENDENT
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
## 2019-07-14 NOTE — PROGRESS NOTES
"Patient was overall appropriate with staff.  She was able to follow staff directions.  She had no SiB, or threatening behaviors.  She attempted to make phone calls to her friends (unable to reach them) and did so appropriately.  Patient responded well to reassurance and support. She understood that she would not discharge on Monday, or likely soon, but that safe behaviors would help get her closer to discharge.      At approx 1915 patient endorsed anxiety 2/2 increased acuity on the unit due to multiple codes.  The patient requested her HS medications and \"something for anxiety\".      Patient accepted readily her HS haldol and PRN ativan and benadryl to help relieve anxiety and mitigate EPSE.  Patient declined cogentin.  "

## 2019-07-15 VITALS
RESPIRATION RATE: 16 BRPM | SYSTOLIC BLOOD PRESSURE: 123 MMHG | TEMPERATURE: 97.3 F | HEART RATE: 108 BPM | DIASTOLIC BLOOD PRESSURE: 84 MMHG | OXYGEN SATURATION: 98 %

## 2019-07-15 PROCEDURE — 25000132 ZZH RX MED GY IP 250 OP 250 PS 637: Performed by: PSYCHIATRY & NEUROLOGY

## 2019-07-15 PROCEDURE — 93005 ELECTROCARDIOGRAM TRACING: CPT

## 2019-07-15 PROCEDURE — 25000128 H RX IP 250 OP 636: Performed by: PSYCHIATRY & NEUROLOGY

## 2019-07-15 PROCEDURE — 93010 ELECTROCARDIOGRAM REPORT: CPT | Performed by: INTERNAL MEDICINE

## 2019-07-15 PROCEDURE — 99233 SBSQ HOSP IP/OBS HIGH 50: CPT | Performed by: PSYCHIATRY & NEUROLOGY

## 2019-07-15 PROCEDURE — 12400001 ZZH R&B MH UMMC

## 2019-07-15 RX ADMIN — HALOPERIDOL DECANOATE 200 MG: 100 INJECTION INTRAMUSCULAR at 15:44

## 2019-07-15 RX ADMIN — DIPHENHYDRAMINE HYDROCHLORIDE 50 MG: 50 CAPSULE ORAL at 16:57

## 2019-07-15 RX ADMIN — HALOPERIDOL 5 MG: 5 TABLET ORAL at 19:49

## 2019-07-15 RX ADMIN — HALOPERIDOL 5 MG: 5 TABLET ORAL at 16:57

## 2019-07-15 RX ADMIN — DIPHENHYDRAMINE HYDROCHLORIDE 50 MG: 50 CAPSULE ORAL at 12:49

## 2019-07-15 RX ADMIN — HYDROXYZINE HYDROCHLORIDE 50 MG: 25 TABLET ORAL at 19:49

## 2019-07-15 RX ADMIN — LORAZEPAM 2 MG: 1 TABLET ORAL at 16:58

## 2019-07-15 RX ADMIN — HALOPERIDOL 5 MG: 5 TABLET ORAL at 08:54

## 2019-07-15 RX ADMIN — LORAZEPAM 2 MG: 1 TABLET ORAL at 12:49

## 2019-07-15 RX ADMIN — HALOPERIDOL 5 MG: 5 TABLET ORAL at 12:49

## 2019-07-15 ASSESSMENT — ACTIVITIES OF DAILY LIVING (ADL)
ORAL_HYGIENE: INDEPENDENT
HYGIENE/GROOMING: INDEPENDENT
ORAL_HYGIENE: INDEPENDENT
DRESS: SCRUBS (BEHAVIORAL HEALTH)
LAUNDRY: UNABLE TO COMPLETE
DRESS: SCRUBS (BEHAVIORAL HEALTH);INDEPENDENT
HYGIENE/GROOMING: INDEPENDENT

## 2019-07-15 NOTE — PROVIDER NOTIFICATION
"   07/15/19 1515   Justification   Clinical Justification Others     Patient had a difficult shift today (see note filed by RN writer earlier this shift).  Ilene became abruptly agitated and extremely aggressive without any clear trigger this afternoon during the [id number].  RN writer was giving day shift report to the oncoming evening shift when loud screaming was heard outside of the conference room door.  Upon entering the hallway, Ilene could be seen brandishing a floor mop as a weapon.  She was threatening violence toward unit staff and challenging us to \"take me on one-on-one!- I'll take any one of you motherfuckers!\".  A \"code 21 with security alert\" was paged overhead, along with a \"hold in place\", as she had an implement and was positioned near the unit exit doors.  The behavioral code team and security officers entered the hallway through the stairwell.  A taser was pulled out and Ilene immediately dropped the weapon and cooperated with the responding security staff.  The behavioral code team then took control using BCS interventions and Ilene was placed in restraints without incident.  Ilene verbalized several times that she had been \"eating paper\", but she later denied this statement.  RN writer reviewed these events with Dr. Allen and obtained an order for restraints.  Her Haldol Decanoate was administered.  No apparent injury occurred during the initiation of restraints.  Will continue to monitor closely.  "

## 2019-07-15 NOTE — PLAN OF CARE
"Patient presents as labile and reactive with poor insight into her mental illness.  She initially presented as calm, pleasant, and cooperative this morning.  She denied having any manic or psychotic symptoms.  She denied having any self harm or aggressive ideation.  She accepted her scheduled dose of Haldol without incident (and continues to refuse her scheduled Cogentin, although she did mention a desire to have this available on a PRN basis).  She was also compliant with receiving her Haldol Decanoate; unfortunately, there was a failure the injection needle/ syringe and the medication sprayed all over Ilene's arm- so this medication was essentially not given (RN writer spoke with inpatient pharmacy and arranged for a replacement dose to be sent to the unit).  Around 12:30 this afternoon- and shortly after meeting with Dr. Allen, Ilene became extremely agitated and began engaging in dangerous behaviors.  Initially, she just threw the food that was on the hard plastic plates.  RN writer joined her SIO 2:1 staff to assist in verbal de-escalation attempts; however, her behavior continued to escalate, and her behaviors became increasingly dangerous. She used the mattress from her room to obstruct the view of her SIO staff by placing up against her door.  She then began to repeatedly threaten violence toward Dr. Allen and all of the unit staff, stating that she would \"kill all you motherfuckers!\".  She eventually moved far enough back into her room and unit staff were able to pull the mattress out of her room to resume continual observation.  At this point, she took the hard plastic plate and broke it into several pieces on the floor.  At this point, a \"code 21 with security alert\" was paged, as she had an implement that she could use as a weapon to carry out her homicidal threats toward unit staff.  Once the behavioral code team and security officers arrived on the unit to assist with the code, Ilene sat down on " "her bed and asked, \"Am I in trouble?\".  She then asked for a \"second chance\" and agreed to safe behaviors.  She accepted PRN medications given orally: Haldol, Benadryl, and Ativan.  While taking these medications, the code team tried to assemble the various pieces of broken plate scattered around her room.  When it became clear that two pieces were missing, Ilene was informed that we would need to conduct a patient safety search.  Ilene initially refused this and stated that she flushed the missing pieces down the toilet.  When advised that we would need to move forward with the patient search irrespective of her consent, she cooperated with this.  Both of the missing plate pieces were found on Ilene's person (they had been shoved into the waistband of her scrub pants).  We were able to confirm that all of the broken plate pieces had been accounted for.  At this point, Ilene appeared calm and was able to contract for safety; as such, she was not placed in 5 point restraints.  Shortly after the code team left the unit, Ilene began to escalate again; however, her aggressive behavior was limited to verbal aggression with multiple threats made toward Dr. Allen.  Ilene would occasionally interrupt her diatribe with comments such as, \"Are you getting upset yet?\" and \"I can see that what I'm doing is making you mad!\".  She would then return to making bizarre and repetitive statements, including but not limited to: \"Fuck the [employer]!\"; \"My prophet is going to kill your God!\"; \"I have Ya, send me back to my country!\".  Her agitated behavior continued for the next hour and then began to dissipate.  She then presented as calm and brought forward an acute physical concern: chest pain.  Vital signs were quickly assessed and found to be WDL with the exception of mild tachycardia.  RN writer contacted Dr. Allen and reviewed the above events, including the homicidal threats this patient made toward her and the c/o chest " "pain.  We also discussed the difficulties with the administration of Haldol Decanoate; this has been rescheduled for the day shift of 7/16/19.  EKG ordered.  The EKG technician arrived on the unit and Ilene cooperated with the procedure.  The results of the EKG showed a \"normal EKG\".  Will continue to monitor closely.  "

## 2019-07-15 NOTE — PROGRESS NOTES
Left  for pt's CCM Anival Benoit- Phone: [phone] to discuss discharge options for this pt.  She cannot return to family as they have filed a restraining order against her.  Asked if they are considering anything for her.
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
## 2019-07-15 NOTE — PROVIDER NOTIFICATION
"   07/15/19 1545   Seclusion or Restraint Order   In Person Face to Face Assessment Conducted Yes-Eval of pt's immediate situation, reaction to intervention, complete review of systems assessment, behavioral assessment & review/assessment of hx, drugs & meds, recent labs, etc, behavioral condition, need to continue/terminate restraint/seclusion   Patient Experienced No adverse physical outcome from seclusion/restraint initiation   Continuation of Seclus/Restraint indicated at this time Yes   Face to face assessment completed. She denies any pain or injury. Reports that restraints are uncomfortable; writer assessed restraint placement. Pt states \"I'll be good. I'll take my meds.\" Writer discussed discontinuation criteria, which includes showing safe behaviors (no threatening staff or patients, no assaultive behavior, follow safety directions). Pt refused to acknowledge responsibility for behavior, saying \"I was joking, you know that.\" She then started screaming obscenities and insults at writer.   Physician notified of face to face results. All medications, lab results, and physician orders reviewed.    "
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
## 2019-07-15 NOTE — PROVIDER NOTIFICATION
07/15/19 1700   Education   Discontinuation Criteria Cessation of behavior that precipitated seclusion or restraint   Criteria Explained Yes   Patient's Response VU   Family Notification D   Restraint Type   Wrist - R (BH) Discontinued   Wrist - L (BH) Discontinued   Ankle - R (BH) Discontinued   Ankle - L (BH) Discontinued   Chest (BH) Discontinued   Pt continues to show limited insight into how she cannot threaten or assault staff. Pt verbalized that she is frustrated about pending legal charges and wants to show the treatment team that she can be calm and cooperative. She does appear calmer, and is agreeable to safe behaviors. Verbalizes that she will not engage in aggressive behaviors. Pt agreed to take additional prn medications due to ongoing agitation, which she stated would be helpful. Restraints discontinued at this time.

## 2019-07-15 NOTE — PROGRESS NOTES
Patient had a good evening.  She was calm and cooperative with staff.  She complied with her space restriction.  She asked questions about discharge. At this time, she was accepting that she would likely not discharge on Monday, but understood that good behavior would help her discharge quicker.  Patient made appropriate phone calls (no agitation - unsure of content, as she was speaking [place]).    Patient requested PRN Ativan and Benadryl with her HS Haldol, as she endorsed increasing anxiety and frustration from the restraining order she had been issued earlier in the day.    Patient provided urine, for the requested labs (See results review).

## 2019-07-16 LAB — INTERPRETATION ECG - MUSE: NORMAL

## 2019-07-16 PROCEDURE — 25000132 ZZH RX MED GY IP 250 OP 250 PS 637: Performed by: PSYCHIATRY & NEUROLOGY

## 2019-07-16 RX ORDER — HALOPERIDOL 5 MG/1
5 TABLET ORAL 2 TIMES DAILY
Qty: 60 TABLET | Refills: 0 | Status: ON HOLD | OUTPATIENT
Start: 2019-07-16 | End: 2020-02-10

## 2019-07-16 RX ORDER — BENZTROPINE MESYLATE 0.5 MG/1
0.5 TABLET ORAL 2 TIMES DAILY
Qty: 60 TABLET | Refills: 0 | Status: SHIPPED | OUTPATIENT
Start: 2019-07-16 | End: 2019-07-21

## 2019-07-16 RX ORDER — HALOPERIDOL DECANOATE 100 MG/ML
200 INJECTION INTRAMUSCULAR
Status: ON HOLD
Start: 2019-07-16 | End: 2020-02-10

## 2019-07-16 RX ADMIN — HALOPERIDOL 5 MG: 5 TABLET ORAL at 08:48

## 2019-07-16 RX ADMIN — NICOTINE POLACRILEX 8 MG: 4 GUM, CHEWING ORAL at 11:06

## 2019-07-16 ASSESSMENT — ACTIVITIES OF DAILY LIVING (ADL)
DRESS: SCRUBS (BEHAVIORAL HEALTH);INDEPENDENT
ORAL_HYGIENE: INDEPENDENT
HYGIENE/GROOMING: INDEPENDENT
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
## 2019-07-16 NOTE — PROGRESS NOTES
This writer had a conversation with Frederick Razo regarding provisionally discharging a pt (if) the OP Team is not in agreement or willing to sign off on a PD.  He said that we are still able to discharge a pt IF they do not sign pd.      This writer spoke at length with pt's OP CM Anival Benoit regarding our likely discharge of Ilene today. She is planning to speak with her supervisor and get back to me about if they will sign off on the PD.  Faxed over to her our copy that the pt signed. She would like a phone call when the pt discharges.      Provisional discharge is signed and faxed to [facility] court along with change of status form.  File No: [id number]

## 2019-07-16 NOTE — DISCHARGE INSTRUCTIONS
Behavioral Discharge Planning and Instructions      Summary:  You were admitted on 7/10/2019  due to Homicidal Ideations/Threatening Behaviors.  You were treated by Dr. Aidee Allen MD and discharged on 7/16/19 from Station 12 to your cousin's house.      You were dually committed to the [facility] and the [facility] of Human [facility] on 4/17/2019.  You are being discharged on a Provisional Discharge Agreement which shall remain in effect for the duration of the Commitment.  Your Commitment expires on 10/17/2019.    You were also court ordered to take the medications the doctor ordered for you.     Principal Diagnoses:   Schizoaffective disorder, bipolar type  Borderline personality disorder  Cocaine use disorder  Cannabis use disorder  Nicotine use disorder  History of intellectual disability      Health Care Follow-up Appointments:   - Psychiatry Appointment/Medication Mangagement   Appointment: Thursday, July 25th @ 10:30am w/ Dr. Angela Muller   [facility] [address], [address],   [facility]  Phone:  [phone]      Fax: [phone]   HUC TO FAX AVS        - [facility] :   Amelia Frazier- Phone:  [phone]   Anival Benoit- Phone: [phone].   The [facility] supervisor is Radha Chinchilla at [phone]   Fax: [phone]    -You are discharging to your cousin's house.  You are court ordered to keep in contact with your  listed above, take your medications and follow-all instructions on your Provisional Discharge which you were given a copy of.  Attend all scheduled appointments with your outpatient providers. Call at least 24 hours in advance if you need to reschedule an appointment to ensure continued access to your outpatient providers.   Major Treatments, Procedures and Findings:  You were provided with: a psychiatric assessment, assessed for medical stability, medication evaluation and/or management,  "group therapy and milieu management    Symptoms to Report: feeling more aggressive, increased confusion, losing more sleep, mood getting worse or thoughts of suicide    Early warning signs can include: increased depression or anxiety sleep disturbances increased thoughts or behaviors of suicide or self-harm  increased unusual thinking, such as paranoia or hearing voices    Safety and Wellness:  Take all medicines as directed.  Make no changes unless your doctor suggests them.      Follow treatment recommendations.  Refrain from alcohol and non-prescribed drugs.  If there is a concern for safety, call 911.    Resources:   Crisis Intervention: 531.961.1676 or 058-712-4152 (TTY: 308.716.6100).  Call anytime for help.  National [place] on Mental Illness (www.mn.jose.org): 634.139.5103 or 324-896-4989.  [place] Association for Children's Mental Health (www.macmh.org): 377.865.2074.  Alcoholics Anonymous (www.alcoholics-anonymous.org): Check your phone book for your local chapter.  Suicide Awareness Voices of Education (SAVE) (www.save.org): 873-008-EAOQ (5712)  National Suicide Prevention Line (www.mentalhealthmn.org): 125-120-PQXF (7558)  Mental Health Consumer/Survivor Network of [place] (www.mhcsn.net): 406.634.3230 or 351-388-0167  Mental Health Association of [place] (www.mentalhealth.org): 444.513.2869 or 351-062-4193  Self- Management and Recovery Training., [employer]-- Toll free: 506.415.4667  www.[employer]  [facility] Crisis (COPE) Response - Adult [phone]  [facility] Crisis Response - Adult 634 711-4831  Text 4 Life: txt \"LIFE\" to 78197 for immediate support and crisis intervention  Crisis text line: Text \"[place]\" to 189050. Free, confidential, 24/7.  Crisis Intervention: 763.743.9803 or 743-314-3871. Call anytime for help.   [facility] Crisis Team - Child: [phone]      The treatment team has appreciated the opportunity to work with you. If you have any questions or concerns our " unit number is [phone].  You may be receiving a follow-up phone call within the next three days from a representative from behavioral health.  You have identified the best phone number to reach you as [phone].
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
## 2019-07-16 NOTE — PLAN OF CARE
"Patient presents as calm, pleasant, and cooperative.  She denies any manic, depressive, or psychotic symptoms at this time.  She was apologetic for her aggressive behavior yesterday, stating that \"sometimes I lose my cool\"; she added, \"It's just really hard for me to be on a locked unit in the hospital\".  She was compliant with her AM scheduled dose of Haldol, but she continues to refuse her scheduled doses of Cogentin.  Her affect is blunted, but she was able to demonstrate more affective range when informed that her request for discharge would be granted.  She demonstrated good stress tolerance throughout the shift, as the treatment team needed sufficient time to arrange discharge related items.  She noted feeling \"happy\" about being able to \"get back to my life\" and stated that she was hopeful for the future.  She continues to demonstrate poor insight into her mental and chemical health relapse in that she denies any use of intoxicants with the exception of THC.  RN writer provided patient education on the role of intoxicants- PCP, cocaine, and THC- in mental health relapse.  She affirmed an understanding of this teaching, but she continues to minimize her use of illicit substance and claims she was only using THC prior to admission.  RN writer reinforced the items listed on her provisional discharge agreement, and Ilene indicated that she intends to follow all of these items to avoid mental health decompensation and rehospitalization.  Currently, she denies any self harm or aggressive ideations.  She has not engaged in any dangerous behaviors this shift and has maintained behavioral self control.  She refused vital signs assessment today, but she denies any acute physical concerns at this time.  She denies any adverse side effects from her current medication regimen.  She had no further complaints or requests.  She appears adequate for discharge from the hospital at this time.  "

## 2019-07-16 NOTE — PROGRESS NOTES
Appt set up for medication mgmt for Ilene.  Information listed below.     - Psychiatry Appointment/Medication Mangagement   Appointment: Thursday, July 25th @ 10:30am w/ Dr. Angela Muller   [facility]  [address], [address],   [facility]  Phone:  [phone]      Fax: [phone]   [facility] TO FAX AVS        - [facility] :   Amelia Frazier- Phone:  [phone]   Anival Benoit- Phone: [phone].   The [facility] supervisor is Radha Chinchilla at [phone]   Fax: [phone]    -You are discharging to your cousin's house.  You are court ordered to keep in contact with your  listed above, take your medications and follow-all instructions on your Provisional Discharge which you were given a copy of.

## 2019-07-20 ENCOUNTER — HOSPITAL ENCOUNTER (EMERGENCY)
Facility: CLINIC | Age: 29
Discharge: HOME | End: 2019-07-21
Attending: EMERGENCY MEDICINE | Admitting: EMERGENCY MEDICINE
Payer: COMMERCIAL

## 2019-07-20 DIAGNOSIS — F25.9 SCHIZOAFFECTIVE DISORDER, UNSPECIFIED TYPE (H): ICD-10-CM

## 2019-07-20 PROCEDURE — 90791 PSYCH DIAGNOSTIC EVALUATION: CPT

## 2019-07-20 PROCEDURE — 99285 EMERGENCY DEPT VISIT HI MDM: CPT | Mod: 25 | Performed by: EMERGENCY MEDICINE

## 2019-07-20 PROCEDURE — 99284 EMERGENCY DEPT VISIT MOD MDM: CPT | Mod: Z6 | Performed by: EMERGENCY MEDICINE

## 2019-07-20 PROCEDURE — 80320 DRUG SCREEN QUANTALCOHOLS: CPT | Performed by: EMERGENCY MEDICINE

## 2019-07-20 PROCEDURE — 80307 DRUG TEST PRSMV CHEM ANLYZR: CPT | Performed by: EMERGENCY MEDICINE

## 2019-07-20 PROCEDURE — 81025 URINE PREGNANCY TEST: CPT | Performed by: EMERGENCY MEDICINE

## 2019-07-20 ASSESSMENT — MIFFLIN-ST. JEOR: SCORE: 1643.94

## 2019-07-20 NOTE — ED AVS SNAPSHOT
[facility], [place], Emergency Department  [address]  [place] [address]  Phone:  [phone]  Fax:  [phone]                                    Ilene Liu   MRN: [id number]    Department:  [facility], Emergency Department   Date of Visit:  7/20/2019           After Visit Summary Signature Page    I have received my discharge instructions, and my questions have been answered. I have discussed any challenges I see with this plan with the nurse or doctor.    ..........................................................................................................................................  Patient/Patient Representative Signature      ..........................................................................................................................................  Patient Representative Print Name and Relationship to Patient    ..................................................               ................................................  Date                                   Time    ..........................................................................................................................................  Reviewed by Signature/Title    ...................................................              ..............................................  Date                                               Time          22EPIC Rev 08/18

## 2019-07-21 VITALS
TEMPERATURE: 97 F | WEIGHT: 200 LBS | DIASTOLIC BLOOD PRESSURE: 85 MMHG | HEIGHT: 66 IN | OXYGEN SATURATION: 97 % | RESPIRATION RATE: 16 BRPM | BODY MASS INDEX: 32.14 KG/M2 | HEART RATE: 100 BPM | SYSTOLIC BLOOD PRESSURE: 142 MMHG

## 2019-07-21 LAB
AMPHETAMINES UR QL SCN: POSITIVE
BARBITURATES UR QL: NEGATIVE
BENZODIAZ UR QL: NEGATIVE
CANNABINOIDS UR QL SCN: POSITIVE
COCAINE UR QL: POSITIVE
ETHANOL UR QL SCN: POSITIVE
HCG UR QL: NEGATIVE
OPIATES UR QL SCN: NEGATIVE

## 2019-07-21 ASSESSMENT — ENCOUNTER SYMPTOMS
HALLUCINATIONS: 0
SHORTNESS OF BREATH: 0
ABDOMINAL PAIN: 0
FEVER: 0

## 2019-07-21 NOTE — PROGRESS NOTES
Per Lorena at [facility] ([phone]), she received clinical and will review it. If appropriate, will do phone screen when pt is awake, or about 8:30am, whichever is sooner.

## 2019-07-21 NOTE — ED NOTES
The patient was accepted at [facility]. She can contract for safety.      Paz Zamudio MD  07/21/19 0901
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
## 2019-07-21 NOTE — DISCHARGE INSTRUCTIONS
Continue with your current meds. Follow up with your outpatient providers.     Discharge to [facility]
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
## 2019-09-12 NOTE — PROGRESS NOTES
Pt had one episode of agitating behaviors on Friday day shift as she tried to elope from the unit. She did apologize for her elopement behaviors. She continues to report her frustration regarding the length of her stay, after a lengthy conversation regarding her placement, she shows understanding that the treatment team was searching for placement for her and will leave when a bed opens up. Overall, she had a pleasant weekend; she spent most of this weekend isolative to her room and minimally social when visible in the milieu. No symptoms of psychosis observed or reported, and she denied all mental health symptoms. She is fully alert and oriented. She remains medication compliant and denies SE s.  Pt remained focused on discharge and wanted to know if she will discharge this week. Per CTC note,  [facility] stating they can admit patient on Wednesday, January 9th to [facility]. .  Pt was aware of this information and looking forward to discharge. No SI/SIB.   Stable on current regimen. Patient will follow up with Dr. Banks for his kidney function.  BP Readings from Last 4 Encounters:   09/12/19 138/72   07/10/19 148/81   12/17/18 142/74   11/28/18 130/68

## 2019-11-12 NOTE — PROVIDER NOTIFICATION
"   07/12/19 1035   Justification   Clinical Justification Others     Patient was placed in 5 point seclusions in room. When going to talk to patient to explain discontinuation criteria and what caused this code patient immediately began screaming \"fuck you, you dumb blonde! Go kill yourself you redneck cracker!\" and continued to scream such profanities. Patient then said \"you don't have anything to worry about right now because I'm in here, but just wait until I'm not. You're gonna need to watch yourself.\" RN discontinued conversation at this time when patient then began screaming \"fuck the illimunati. You all need to kill yourselves! Fuck all of you. Kill yourselves!\" Patient proceeded to do this for minutes despite staff attempting to redirect. Other patients had become triggered and and began crying and screaming. Another code 21 was called and patient was placed in unlocked seclusion with the door open in 5 point restraints while remaining on a 2:1. Restraints continued at this time.  " room air

## 2019-11-16 ENCOUNTER — HOSPITAL ENCOUNTER (EMERGENCY)
Facility: CLINIC | Age: 29
Discharge: HOME | End: 2019-11-16
Attending: EMERGENCY MEDICINE | Admitting: EMERGENCY MEDICINE
Payer: COMMERCIAL

## 2019-11-16 ENCOUNTER — APPOINTMENT (OUTPATIENT)
Dept: GENERAL RADIOLOGY | Facility: CLINIC | Age: 29
End: 2019-11-16
Attending: EMERGENCY MEDICINE
Payer: COMMERCIAL

## 2019-11-16 VITALS
BODY MASS INDEX: 33.43 KG/M2 | HEIGHT: 66 IN | HEART RATE: 82 BPM | OXYGEN SATURATION: 100 % | SYSTOLIC BLOOD PRESSURE: 118 MMHG | DIASTOLIC BLOOD PRESSURE: 65 MMHG | TEMPERATURE: 97.8 F | RESPIRATION RATE: 16 BRPM | WEIGHT: 208 LBS

## 2019-11-16 DIAGNOSIS — R39.198 SLOWING OF URINARY STREAM: ICD-10-CM

## 2019-11-16 DIAGNOSIS — K59.00 CONSTIPATION, UNSPECIFIED CONSTIPATION TYPE: ICD-10-CM

## 2019-11-16 LAB
ALBUMIN UR-MCNC: NEGATIVE MG/DL
ANION GAP SERPL CALCULATED.3IONS-SCNC: 8 MMOL/L (ref 3–14)
APPEARANCE UR: CLEAR
BASOPHILS # BLD AUTO: 0 10E9/L (ref 0–0.2)
BASOPHILS NFR BLD AUTO: 0.2 %
BILIRUB UR QL STRIP: NEGATIVE
BUN SERPL-MCNC: 11 MG/DL (ref 7–30)
CALCIUM SERPL-MCNC: 9.2 MG/DL (ref 8.5–10.1)
CHLORIDE SERPL-SCNC: 104 MMOL/L (ref 94–109)
CO2 SERPL-SCNC: 21 MMOL/L (ref 20–32)
COLOR UR AUTO: YELLOW
CREAT SERPL-MCNC: 0.48 MG/DL (ref 0.52–1.04)
DIFFERENTIAL METHOD BLD: NORMAL
EOSINOPHIL # BLD AUTO: 0.3 10E9/L (ref 0–0.7)
EOSINOPHIL NFR BLD AUTO: 6.9 %
ERYTHROCYTE [DISTWIDTH] IN BLOOD BY AUTOMATED COUNT: 12.9 % (ref 10–15)
GFR SERPL CREATININE-BSD FRML MDRD: >90 ML/MIN/{1.73_M2}
GLUCOSE SERPL-MCNC: 99 MG/DL (ref 70–99)
GLUCOSE UR STRIP-MCNC: NEGATIVE MG/DL
HCG UR QL: NEGATIVE
HCT VFR BLD AUTO: 43 % (ref 35–47)
HGB BLD-MCNC: 14.9 G/DL (ref 11.7–15.7)
HGB UR QL STRIP: NEGATIVE
IMM GRANULOCYTES # BLD: 0 10E9/L (ref 0–0.4)
IMM GRANULOCYTES NFR BLD: 0 %
INTERNAL QC OK POCT: YES
KETONES UR STRIP-MCNC: NEGATIVE MG/DL
LEUKOCYTE ESTERASE UR QL STRIP: NEGATIVE
LYMPHOCYTES # BLD AUTO: 1.2 10E9/L (ref 0.8–5.3)
LYMPHOCYTES NFR BLD AUTO: 29.3 %
MCH RBC QN AUTO: 33 PG (ref 26.5–33)
MCHC RBC AUTO-ENTMCNC: 34.7 G/DL (ref 31.5–36.5)
MCV RBC AUTO: 95 FL (ref 78–100)
MONOCYTES # BLD AUTO: 0.3 10E9/L (ref 0–1.3)
MONOCYTES NFR BLD AUTO: 7.4 %
MUCOUS THREADS #/AREA URNS LPF: PRESENT /LPF
NEUTROPHILS # BLD AUTO: 2.4 10E9/L (ref 1.6–8.3)
NEUTROPHILS NFR BLD AUTO: 56.2 %
NITRATE UR QL: NEGATIVE
NRBC # BLD AUTO: 0 10*3/UL
NRBC BLD AUTO-RTO: 0 /100
PH UR STRIP: 5.5 PH (ref 5–7)
PLATELET # BLD AUTO: 156 10E9/L (ref 150–450)
POTASSIUM SERPL-SCNC: 3.8 MMOL/L (ref 3.4–5.3)
RBC # BLD AUTO: 4.51 10E12/L (ref 3.8–5.2)
RBC #/AREA URNS AUTO: 1 /HPF (ref 0–2)
SODIUM SERPL-SCNC: 133 MMOL/L (ref 133–144)
SOURCE: ABNORMAL
SP GR UR STRIP: 1.02 (ref 1–1.03)
SPECIMEN SOURCE: NORMAL
SQUAMOUS #/AREA URNS AUTO: <1 /HPF (ref 0–1)
UROBILINOGEN UR STRIP-MCNC: NORMAL MG/DL (ref 0–2)
WBC # BLD AUTO: 4.2 10E9/L (ref 4–11)
WBC #/AREA URNS AUTO: <1 /HPF (ref 0–5)
WET PREP SPEC: NORMAL

## 2019-11-16 PROCEDURE — 99284 EMERGENCY DEPT VISIT MOD MDM: CPT | Mod: Z6 | Performed by: EMERGENCY MEDICINE

## 2019-11-16 PROCEDURE — 81025 URINE PREGNANCY TEST: CPT | Performed by: EMERGENCY MEDICINE

## 2019-11-16 PROCEDURE — 80048 BASIC METABOLIC PNL TOTAL CA: CPT | Performed by: EMERGENCY MEDICINE

## 2019-11-16 PROCEDURE — 99284 EMERGENCY DEPT VISIT MOD MDM: CPT | Mod: 25

## 2019-11-16 PROCEDURE — 81001 URINALYSIS AUTO W/SCOPE: CPT | Performed by: EMERGENCY MEDICINE

## 2019-11-16 PROCEDURE — 87591 N.GONORRHOEAE DNA AMP PROB: CPT | Performed by: EMERGENCY MEDICINE

## 2019-11-16 PROCEDURE — 85025 COMPLETE CBC W/AUTO DIFF WBC: CPT | Performed by: EMERGENCY MEDICINE

## 2019-11-16 PROCEDURE — 74019 RADEX ABDOMEN 2 VIEWS: CPT

## 2019-11-16 PROCEDURE — 87210 SMEAR WET MOUNT SALINE/INK: CPT | Performed by: EMERGENCY MEDICINE

## 2019-11-16 PROCEDURE — 87491 CHLMYD TRACH DNA AMP PROBE: CPT | Performed by: EMERGENCY MEDICINE

## 2019-11-16 ASSESSMENT — ENCOUNTER SYMPTOMS
SHORTNESS OF BREATH: 0
FEVER: 0
VOMITING: 0
ABDOMINAL PAIN: 0
CONSTIPATION: 1
BLOOD IN STOOL: 0
DYSURIA: 0

## 2019-11-16 ASSESSMENT — MIFFLIN-ST. JEOR: SCORE: 1680.23

## 2019-11-16 NOTE — ED TRIAGE NOTES
"Pt arrives reporting bilateral lower abdominal pain x \"weeks\", \"I'm constipated\" last BM 1 week PTA. Has had hx frequent constipation, has taken mirilax with no relief. Denies N/V/D, endorses malodorous urine, denies pain with urination.   "

## 2019-11-16 NOTE — ED PROVIDER NOTES
"  History     Chief Complaint   Patient presents with     Abdominal Pain     HPI  Ilene Liu is a 29 year old [place] female with a history of schizophrenia, depression, and polysubstance abuse (marijuana, cocaine) who presents for evaluation of constipation. The patient reports she's had only a few bowel movements over the past \"weeks\" and when she does have a movement, her stool is \"liquidy\". She states her last bowel movement was about 1 week ago, after she took Miralax for \"a week straight\". Patient reports she is no longer taking Miralax. She denies history of abdominal surgeries. Denies vomiting, fevers, blood in stool, or abdominal pain. Denies chance of pregnancy, reporting that her LMP was \"sometime last month\".     In addition, patient complains of malodorous urine \"for weeks\". She denies dysuria. She denies fevers or abdominal pain.  At some time later in the emergency department course, the patient also mentioned that she had vaginal odor.  She  has a history of HPV.  She denies vaginal discharge or vaginal pain..    Past Medical History:   Diagnosis Date     Asthma      Cocaine abuse      Depressive disorder      Marijuana use      Schizo-affective type schizophrenia        Past Surgical History:   Procedure Laterality Date     ESOPHAGOSCOPY, GASTROSCOPY, DUODENOSCOPY (EGD), COMBINED N/A 12/17/2018    Procedure: COMBINED ESOPHAGOSCOPY, GASTROSCOPY, DUODENOSCOPY (EGD);  Surgeon: Cassie Rahman MD;  Location:  GI       History reviewed. No pertinent family history.    Social History     Tobacco Use     Smoking status: Current Some Day Smoker     Packs/day: 0.25     Smokeless tobacco: Never Used   Substance Use Topics     Alcohol use: Yes     Comment: not currently drinking anything       Current Facility-Administered Medications   Medication     pink lady enema (COMPOUNDED: docusate, magnesium citrate, mineral oil, sodium phosphate)     Current Outpatient Medications   Medication     haloperidol " "decanoate (HALDOL DECANOATE) 100 MG/ML injection     albuterol (PROAIR HFA/PROVENTIL HFA/VENTOLIN HFA) 108 (90 Base) MCG/ACT inhaler     haloperidol (HALDOL) 5 MG tablet      No Known Allergies     I have reviewed the Medications, Allergies, Past Medical and Surgical History, and Social History in the Epic system.    Review of Systems   Constitutional: Negative for fever.   Respiratory: Negative for shortness of breath.    Cardiovascular: Negative for chest pain.   Gastrointestinal: Positive for constipation. Negative for abdominal pain, blood in stool and vomiting.   Genitourinary: Negative for dysuria.        Positive for malodorous urine   All other systems reviewed and are negative.      Physical Exam   BP: (!) 136/95  Pulse: 106  Temp: 97.8  F (36.6  C)  Resp: 16  Height: 167.6 cm (5' 6\")  Weight: 94.3 kg (208 lb)  SpO2: 99 %      Physical Exam  Physical Exam   Constitutional:   well nourished, well developed, resting comfortably   HENT:   Head: Normocephalic and atraumatic.   Eyes: Conjunctivae are normal. Pupils are equal, round, and reactive to light.    pharynx has no erythema or exudate, mucous membranes are moist  Neck:   no adenopathy, no bony tenderness  Cardiovascular: regular rate and rhythm without murmurs or gallops  Pulmonary/Chest: Clear to auscultation bilaterally, with no wheezes or retractions. No respiratory distress.  GI: Soft with good bowel sounds.  Non-tender, non-distended, with no guarding, no rebound, no peritoneal signs.   : External genitalia within normal limits, no appreciable discharge in vaginal vault, no cervical motion tenderness, body habitus precludes my feeling her ovaries.  Back:  No bony or CVA tenderness   Musculoskeletal:  no edema  Skin: Skin is warm and dry. No rash noted.   Neurological: alert and oriented to person, place, and time. Nonfocal exam  Psychiatric:  Flat mood and affect.   ED Course        Procedures       10:34 AM  The patient was seen and examined by " Dr. Campoverde in Room 5.          Critical Care time:  none           Results for orders placed or performed during the hospital encounter of 11/16/19   UA reflex to Microscopic and Culture     Status: Abnormal   Result Value Ref Range    Color Urine Yellow     Appearance Urine Clear     Glucose Urine Negative NEG^Negative mg/dL    Bilirubin Urine Negative NEG^Negative    Ketones Urine Negative NEG^Negative mg/dL    Specific Gravity Urine 1.016 1.003 - 1.035    Blood Urine Negative NEG^Negative    pH Urine 5.5 5.0 - 7.0 pH    Protein Albumin Urine Negative NEG^Negative mg/dL    Urobilinogen mg/dL Normal 0.0 - 2.0 mg/dL    Nitrite Urine Negative NEG^Negative    Leukocyte Esterase Urine Negative NEG^Negative    Source Midstream Urine     RBC Urine 1 0 - 2 /HPF    WBC Urine <1 0 - 5 /HPF    Squamous Epithelial /HPF Urine <1 0 - 1 /HPF    Mucous Urine Present (A) NEG^Negative /LPF   CBC with platelets differential     Status: None   Result Value Ref Range    WBC 4.2 4.0 - 11.0 10e9/L    RBC Count 4.51 3.8 - 5.2 10e12/L    Hemoglobin 14.9 11.7 - 15.7 g/dL    Hematocrit 43.0 35.0 - 47.0 %    MCV 95 78 - 100 fl    MCH 33.0 26.5 - 33.0 pg    MCHC 34.7 31.5 - 36.5 g/dL    RDW 12.9 10.0 - 15.0 %    Platelet Count 156 150 - 450 10e9/L    Diff Method Automated Method     % Neutrophils 56.2 %    % Lymphocytes 29.3 %    % Monocytes 7.4 %    % Eosinophils 6.9 %    % Basophils 0.2 %    % Immature Granulocytes 0.0 %    Nucleated RBCs 0 0 /100    Absolute Neutrophil 2.4 1.6 - 8.3 10e9/L    Absolute Lymphocytes 1.2 0.8 - 5.3 10e9/L    Absolute Monocytes 0.3 0.0 - 1.3 10e9/L    Absolute Eosinophils 0.3 0.0 - 0.7 10e9/L    Absolute Basophils 0.0 0.0 - 0.2 10e9/L    Abs Immature Granulocytes 0.0 0 - 0.4 10e9/L    Absolute Nucleated RBC 0.0    Basic metabolic panel     Status: Abnormal   Result Value Ref Range    Sodium 133 133 - 144 mmol/L    Potassium 3.8 3.4 - 5.3 mmol/L    Chloride 104 94 - 109 mmol/L    Carbon Dioxide 21 20 - 32 mmol/L     Anion Gap 8 3 - 14 mmol/L    Glucose 99 70 - 99 mg/dL    Urea Nitrogen 11 7 - 30 mg/dL    Creatinine 0.48 (L) 0.52 - 1.04 mg/dL    GFR Estimate >90 >60 mL/min/[1.73_m2]    GFR Estimate If Black >90 >60 mL/min/[1.73_m2]    Calcium 9.2 8.5 - 10.1 mg/dL   hCG qual urine POCT     Status: Normal   Result Value Ref Range    HCG Qual Urine Negative neg    Internal QC OK Yes    Wet prep     Status: None   Result Value Ref Range    Specimen Description Vagina     Wet Prep No clue cells seen     Wet Prep No yeast seen     Wet Prep No motile Trichomonas seen     Wet Prep Few  PMNs seen       v  Labs Ordered and Resulted from Time of ED Arrival Up to the Time of Departure from the ED   UA MACROSCOPIC WITH REFLEX TO MICRO AND CULTURE - Abnormal; Notable for the following components:       Result Value    Mucous Urine Present (*)     All other components within normal limits   BASIC METABOLIC PANEL - Abnormal; Notable for the following components:    Creatinine 0.48 (*)     All other components within normal limits   HCG QUAL URINE POCT - Normal   CBC WITH PLATELETS DIFFERENTIAL   WET PREP   CHLAMYDIA TRACHOMATIS PCR   NEISSERIA GONORRHOEAE PCR            Assessments & Plan (with Medical Decision Making)       I have reviewed the nursing notes.  Emergency Department course:  The patient was seen and examined at 1033 am.   Laboratory studies show an unremarkable CBC, with a WBC of 4.2.  UA is nitrite and LCE negative and hCG is negative.  Basic metabolic panel is essentially unremarkable.  Creatinine is low at 0.48 but GFR is greater than 90.  Wet prep is negative.  GC and Chlamydia are pending at this time.    2 view abdominal x-ray shows nonobstructive bowel gas patternwith clear lungs.  I had intended to treat the patient with a pink lady enema for her primary concern of constipation.  The patient states that she did not want an enema and had a ride coming so she needed to leave the ED.    The patient is a 29-year-old female  who presents with concerns of constipation and foul-smelling urine.  Her laboratory studies are essentially unremarkable.  Wet prep is also negative, as is UA.  The patient complains of an odor to her urine,  but the etiology of this odor is unclear.  I do not appreciate an odor on pelvic examination.  GC and Chlamydia are pending and results will need to be followed by her regular doctor. Her abdominal x-ray is unremarkable.  She has refused an enema.  I did give her discharge instructions on managing constipation.  I recommend she take Metamucil daily to increase her fiber intake.  She does have a primary care physician with whom she can follow-up and she should do so within 1 to 2 weeks.    I have reviewed the findings, diagnosis, plan and need for follow up with the patient.    New Prescriptions    No medications on file       Final diagnoses:   Constipation, unspecified constipation type   I, Joshua Hardin, am serving as a trained medical scribe to document services personally performed by Abbi Campoverde MD, based on the provider's statements to me.   I, Abbi Campoverde MD, was physically present and have reviewed and verified the accuracy of this note documented by Joshua Hardin.  This note was created in part by the use of Dragon voice recognition dictation system. Inadvertent grammatical errors and typographical errors may still exist.  Abbi Campoverde MD    11/16/2019   [facility], [place], EMERGENCY DEPARTMENT     Abbi Campoverde MD  11/16/19 5438

## 2019-11-16 NOTE — ED AVS SNAPSHOT
[facility], [place], Emergency Department  [address]  [place] [address]  Phone:  [phone]  Fax:  [phone]                                    Ilene Liu   MRN: [id number]    Department:  [facility], Emergency Department   Date of Visit:  11/16/2019           After Visit Summary Signature Page    I have received my discharge instructions, and my questions have been answered. I have discussed any challenges I see with this plan with the nurse or doctor.    ..........................................................................................................................................  Patient/Patient Representative Signature      ..........................................................................................................................................  Patient Representative Print Name and Relationship to Patient    ..................................................               ................................................  Date                                   Time    ..........................................................................................................................................  Reviewed by Signature/Title    ...................................................              ..............................................  Date                                               Time          22EPIC Rev 08/18

## 2019-11-16 NOTE — DISCHARGE INSTRUCTIONS
Use metamucil for constipation daily--it is a powder that is available over the counter.  Please make an appointment to follow up with Your Primary Care Provider, [facility] (phone: [phone], or [facility] (phone: [phone]) in 7-10 days.   You have chlamydia and gonorrhea cultures that are pending and need to follow-up with your regular doctor. Do not douche.

## 2019-11-17 LAB
C TRACH DNA SPEC QL NAA+PROBE: NEGATIVE
N GONORRHOEA DNA SPEC QL NAA+PROBE: NEGATIVE
SPECIMEN SOURCE: NORMAL
SPECIMEN SOURCE: NORMAL

## 2020-02-05 ENCOUNTER — TELEPHONE (OUTPATIENT)
Dept: BEHAVIORAL HEALTH | Facility: CLINIC | Age: 30
End: 2020-02-05

## 2020-02-05 ENCOUNTER — HOSPITAL ENCOUNTER (INPATIENT)
Facility: CLINIC | Age: 30
LOS: 5 days | Discharge: NURSING, REHAB OR LONG-TERM CARE | End: 2020-02-11
Attending: FAMILY MEDICINE | Admitting: PSYCHIATRY & NEUROLOGY
Payer: COMMERCIAL

## 2020-02-05 DIAGNOSIS — F12.20 CANNABIS USE DISORDER, MODERATE, DEPENDENCE (H): ICD-10-CM

## 2020-02-05 DIAGNOSIS — F17.213 CIGARETTE NICOTINE DEPENDENCE WITH WITHDRAWAL: ICD-10-CM

## 2020-02-05 DIAGNOSIS — F14.10 COCAINE USE DISORDER (H): ICD-10-CM

## 2020-02-05 DIAGNOSIS — R45.851 SUICIDAL IDEATION: ICD-10-CM

## 2020-02-05 DIAGNOSIS — Z91.148 NONCOMPLIANCE WITH MEDICATION REGIMEN: ICD-10-CM

## 2020-02-05 DIAGNOSIS — F25.0 SCHIZOAFFECTIVE DISORDER, BIPOLAR TYPE (H): Primary | ICD-10-CM

## 2020-02-05 DIAGNOSIS — Z72.89 SELF-INJURIOUS BEHAVIOR: ICD-10-CM

## 2020-02-05 DIAGNOSIS — S00.03XA HEMATOMA OF FRONTAL SCALP, INITIAL ENCOUNTER: ICD-10-CM

## 2020-02-05 DIAGNOSIS — F44.5 PSYCHIATRIC PSEUDOSEIZURE: ICD-10-CM

## 2020-02-05 PROCEDURE — 99285 EMERGENCY DEPT VISIT HI MDM: CPT | Mod: 25 | Performed by: FAMILY MEDICINE

## 2020-02-05 PROCEDURE — 25000128 H RX IP 250 OP 636: Performed by: FAMILY MEDICINE

## 2020-02-05 PROCEDURE — 96372 THER/PROPH/DIAG INJ SC/IM: CPT | Performed by: FAMILY MEDICINE

## 2020-02-05 PROCEDURE — 99292 CRITICAL CARE ADDL 30 MIN: CPT | Mod: Z6 | Performed by: FAMILY MEDICINE

## 2020-02-05 PROCEDURE — 99291 CRITICAL CARE FIRST HOUR: CPT | Mod: Z6 | Performed by: FAMILY MEDICINE

## 2020-02-05 RX ORDER — OLANZAPINE 10 MG/2ML
10 INJECTION, POWDER, FOR SOLUTION INTRAMUSCULAR ONCE
Status: COMPLETED | OUTPATIENT
Start: 2020-02-05 | End: 2020-02-05

## 2020-02-05 RX ORDER — OLANZAPINE 10 MG/2ML
INJECTION, POWDER, FOR SOLUTION INTRAMUSCULAR
Status: DISCONTINUED
Start: 2020-02-05 | End: 2020-02-05 | Stop reason: HOSPADM

## 2020-02-05 RX ADMIN — OLANZAPINE 10 MG: 10 INJECTION, POWDER, LYOPHILIZED, FOR SOLUTION INTRAMUSCULAR at 16:02

## 2020-02-05 NOTE — ED NOTES
Patient resting quietly on mattress. Hijab in room with her as she wanted to keep her head covered due to her cultural beliefs.

## 2020-02-05 NOTE — ED NOTES
Within an hour after restraint an in person face to face assessment was completed at 1705, including an evaluation of the patient's immediate reaction to the intervention, behavioral assessment and review/assessment of history, drugs and medications, recent labs, etc., and behavioral condition.  The patient experienced: No adverse physical outcome from seclusion/restraint initiation.  The intervention of restraint or seclusion needs to continue.     Lowell Johnson MD  02/05/20 3132

## 2020-02-05 NOTE — ED NOTES
Pt arrived in restraints via ambulance; crew had no report to give to staff. On hold via police. Pt not cooperating with staff or talking. 1:1 started

## 2020-02-05 NOTE — ED NOTES
Bed: ED14  Expected date: 2/5/20  Expected time: 12:56 PM  Means of arrival: Ambulance  Comments:  [facility] 33yo female, suicidal, run risk

## 2020-02-05 NOTE — ED PROVIDER NOTES
[facility] [place] ([facility])    2/05/20     ED 14   History     Chief Complaint   Patient presents with     Suicidal     not ansering questions     The history is provided by medical records and the EMS personnel. History limited by: patient unwilling to answer questions.     Ilene Liu is a 30 year old female with history of schizoaffective disorder, borderline personality disorder, substance use (alcohol, marijuana, amphetamines, cocaine), homelessness, prior agitation/aggressive behavior and prior episodes of altered mental status who was brought in by ambulance after police were called after she made suicidal statements. For the past 2-3 weeks patient has been experiencing mental health destabilization. She has been calling her ACT team multiple times, reporting that people are following her.  Today two ACT team members came and saw patient. At that time she voiced suicidal ideation and plan to run into traffic. 911 was called and she was brought here for evaluation. Patient is denying this and is requesting discharge.   [facility] records reviewed, Melinda is patient's primary ACT team worker, [phone]. Via phone, Melinda reports that patient has been receiving haldol injections, last one was 3 weeks ago.     I have reviewed the Medications, Allergies, Past Medical and Surgical History, and Social History in the [employer] system.  PAST MEDICAL HISTORY:   Past Medical History:   Diagnosis Date     Asthma      Cocaine abuse      Depressive disorder      Marijuana use      Schizo-affective type schizophrenia        PAST SURGICAL HISTORY:   Past Surgical History:   Procedure Laterality Date     ESOPHAGOSCOPY, GASTROSCOPY, DUODENOSCOPY (EGD), COMBINED N/A 12/17/2018    Procedure: COMBINED ESOPHAGOSCOPY, GASTROSCOPY, DUODENOSCOPY (EGD);  Surgeon: Cassie Rahman MD;  Location:  GI       FAMILY HISTORY: No family history on file.    SOCIAL HISTORY:   Social  History     Tobacco Use     Smoking status: Current Some Day Smoker     Packs/day: 0.25     Smokeless tobacco: Never Used   Substance Use Topics     Alcohol use: Yes     Comment: not currently drinking anything       Patient's Medications   New Prescriptions    No medications on file   Previous Medications    ALBUTEROL (PROAIR HFA/PROVENTIL HFA/VENTOLIN HFA) 108 (90 BASE) MCG/ACT INHALER    Inhale 2 puffs into the lungs every 6 hours as needed for shortness of breath / dyspnea or wheezing    HALOPERIDOL (HALDOL) 5 MG TABLET    Take 1 tablet (5 mg) by mouth 2 times daily    HALOPERIDOL DECANOATE (HALDOL DECANOATE) 100 MG/ML INJECTION    Inject 200 mg into the muscle every 28 days DUE 7/12/19   Modified Medications    No medications on file   Discontinued Medications    No medications on file        No Known Allergies   Review of Systems   Unable to perform ROS: Psychiatric disorder     Physical Exam          Physical Exam  Constitutional:       General: She is not in acute distress.     Appearance: She is not diaphoretic.   HENT:      Head: Atraumatic.      Mouth/Throat:      Pharynx: No oropharyngeal exudate.   Eyes:      General: No scleral icterus.     Pupils: Pupils are equal, round, and reactive to light.   Cardiovascular:      Heart sounds: Normal heart sounds.   Pulmonary:      Effort: No respiratory distress.      Breath sounds: Normal breath sounds.   Abdominal:      General: Bowel sounds are normal.      Palpations: Abdomen is soft.      Tenderness: There is no abdominal tenderness.   Musculoskeletal:         General: No tenderness.   Skin:     General: Skin is warm.      Findings: No rash.   Neurological:      General: No focal deficit present.      Mental Status: She is oriented to person, place, and time.      Cranial Nerves: Cranial nerves are intact.      Motor: No weakness.      Coordination: Coordination normal.         ED Course     ED Course as of Feb 07 0111   Thu Feb 06, 2020   6800 Patient  struck her head against the door.  She has a large frontal hematoma.  Shortly after she became agitated and required physical restraints.  While the restraints were applied, she had seizure activity.  I was called to evaluate the patient.  On my assessment, this does not appear to be a real seizure, she is actively closing her eyelids and does have control of her extremities.  She was redirectable.  Because of the head injury hematoma and questionable seizure activity, do believe that she needs a stat head CT.  She is refusing to provide any urine or drug for a pregnancy test.  I have educated her on the risk to the fetus if she does not get this test done, she maintains that there is no way she is pregnant and does not want to have this test done.  We will continue with the CT at this time.        Procedures      Patient became very agitated code 21 was called patient was placed in restraints she needed subsequent reevaluation and a second evaluation with code green to move her to a different gurney.  Patient was also given IM Zyprexa.    Critical Care time was 90 minutes for this patient excluding procedures.  She required a 21 alert restraints medication and reevaluation with a second code green called after restraints were removed and subsequent reevaluation as well.    Assessments & Plan (with Medical Decision Making)       I have reviewed the nursing notes.    I have reviewed the findings, diagnosis, plan and need for follow up with the patient.    Patient with schizoaffective disorder bipolar type now with increasing paranoia suicidal ideation noncompliance with her medications patient has been seen and evaluated by her act team as well as staff here with significant risk to herself and others patient will be admitted on a 72-hour hold.  Unfortunately there are no beds available patient will be remaining in the emergency room until an inpatient bed becomes available.    Final diagnoses:   Schizoaffective  disorder, bipolar type (H)   Suicidal ideation   Noncompliance with medication regimen     I, Aziza Hoppre, am serving as a trained medical scribe to document services personally performed by Lowell Johnson MD based on the provider's statements to me on February 5, 2020.  This document has been checked and approved by the attending provider.    I, Lowell Johnson MD, was physically present and have reviewed and verified the accuracy of this note documented by Aziza Hopper, medical scribe.       2/5/2020   [facility], [place], EMERGENCY DEPARTMENT     Lowell Johnson MD  02/05/20 1815       Lowell Johnson MD  02/07/20 0111

## 2020-02-05 NOTE — ED NOTES
"Patient in hallway, yelling out for them to \"leave me alone, quit following me\" while pointing down the miller toward the security staff. Attempted to reassure patient that no one was following her but she continued to yell. Then she took the ends of her hijab into her hands and pulled it tight around her neck. \"I want to be dead\" as she pulled tighter. Writer yelled for assistance and was able to remove the hijab from her. It was placed with her belongings as it is dangerous for her to be allowed to keep it at this time.   "

## 2020-02-05 NOTE — ED NOTES
"Patient continued to escalate her behavior, yelling out about \"them following me\" and that she wanted to be dead. Attempting to get up off of cart. Unable to calm herself. Order for Zyprexa 10 mg IM from Dr. Johnson.   "

## 2020-02-05 NOTE — TELEPHONE ENCOUNTER
Patient cleared and ready for behavioral bed placement: Yes for       S:  Pt is a 30 yr old F seen in the [place] ED due to SI.    B:  Info per  :  Pt has a hx of a schizoaffective D/O.  She verbalized SI to her ACT Team today stating she was thinking of running into traffic or jumping from a bridge.  ACT team says they don't think she is taking her meds, probably not for 2-3 weeks..  She has been very paranoid, calling them many times.  In the ED she denies SI.  Pt has hx of asthma.  Medically cleared.  Awaiting utox.  Pt has been on station 12 3 x in about 1 1/2 yrs.  Please see chart for further info.    -3 pm requested ED MD call us back re is she / will she be on a hold ?  -  Can we look at admit outside of [facility], [facility] ? [facility] ?

## 2020-02-05 NOTE — ED NOTES
"Patient given zyprexa IM at 102. Her behavior continued to escalate, trying to get up off the cart, yelling to leave her alone. Also yelling \"I swear to God I'm going to kill myself.\" Patient then began to bang her head against the side rail of the cart and attempted to kick staff. Five point restraints applied. Patient moved into room 15. 1:1 continued  "

## 2020-02-06 ENCOUNTER — APPOINTMENT (OUTPATIENT)
Dept: CT IMAGING | Facility: CLINIC | Age: 30
End: 2020-02-06
Attending: EMERGENCY MEDICINE
Payer: COMMERCIAL

## 2020-02-06 PROBLEM — F29 PSYCHOSIS (H): Status: ACTIVE | Noted: 2020-02-06

## 2020-02-06 PROCEDURE — 25000128 H RX IP 250 OP 636: Performed by: EMERGENCY MEDICINE

## 2020-02-06 PROCEDURE — 25000132 ZZH RX MED GY IP 250 OP 250 PS 637: Performed by: EMERGENCY MEDICINE

## 2020-02-06 PROCEDURE — 12400001 ZZH R&B MH UMMC

## 2020-02-06 PROCEDURE — 70450 CT HEAD/BRAIN W/O DYE: CPT

## 2020-02-06 RX ORDER — BENZTROPINE MESYLATE 0.5 MG/1
0.5 TABLET ORAL 2 TIMES DAILY
Status: ON HOLD | COMMUNITY
End: 2020-02-10

## 2020-02-06 RX ORDER — POLYETHYLENE GLYCOL 3350 17 G/17G
17 POWDER, FOR SOLUTION ORAL DAILY PRN
COMMUNITY

## 2020-02-06 RX ORDER — LORAZEPAM 2 MG/ML
1-2 INJECTION INTRAMUSCULAR EVERY 4 HOURS PRN
Status: DISCONTINUED | OUTPATIENT
Start: 2020-02-06 | End: 2020-02-11 | Stop reason: HOSPADM

## 2020-02-06 RX ORDER — HALOPERIDOL 5 MG/1
5 TABLET ORAL EVERY 4 HOURS PRN
Status: DISCONTINUED | OUTPATIENT
Start: 2020-02-06 | End: 2020-02-11 | Stop reason: HOSPADM

## 2020-02-06 RX ORDER — DIPHENHYDRAMINE HYDROCHLORIDE 50 MG/ML
25-50 INJECTION INTRAMUSCULAR; INTRAVENOUS EVERY 4 HOURS PRN
Status: DISCONTINUED | OUTPATIENT
Start: 2020-02-06 | End: 2020-02-11 | Stop reason: HOSPADM

## 2020-02-06 RX ORDER — ONDANSETRON 4 MG/1
4 TABLET, ORALLY DISINTEGRATING ORAL ONCE
Status: COMPLETED | OUTPATIENT
Start: 2020-02-06 | End: 2020-02-06

## 2020-02-06 RX ORDER — LORAZEPAM 1 MG/1
1-2 TABLET ORAL EVERY 4 HOURS PRN
Status: DISCONTINUED | OUTPATIENT
Start: 2020-02-06 | End: 2020-02-11 | Stop reason: HOSPADM

## 2020-02-06 RX ORDER — POLYETHYLENE GLYCOL 3350 17 G/17G
17 POWDER, FOR SOLUTION ORAL DAILY PRN
Status: DISCONTINUED | OUTPATIENT
Start: 2020-02-06 | End: 2020-02-11 | Stop reason: HOSPADM

## 2020-02-06 RX ORDER — HYDROXYZINE HYDROCHLORIDE 25 MG/1
25 TABLET, FILM COATED ORAL EVERY 4 HOURS PRN
Status: DISCONTINUED | OUTPATIENT
Start: 2020-02-06 | End: 2020-02-11 | Stop reason: HOSPADM

## 2020-02-06 RX ORDER — ALBUTEROL SULFATE 90 UG/1
2 AEROSOL, METERED RESPIRATORY (INHALATION) EVERY 6 HOURS PRN
Status: DISCONTINUED | OUTPATIENT
Start: 2020-02-06 | End: 2020-02-11 | Stop reason: HOSPADM

## 2020-02-06 RX ORDER — ACETAMINOPHEN 325 MG/1
650 TABLET ORAL EVERY 4 HOURS PRN
Status: DISCONTINUED | OUTPATIENT
Start: 2020-02-06 | End: 2020-02-11 | Stop reason: HOSPADM

## 2020-02-06 RX ORDER — OLANZAPINE 10 MG/2ML
10 INJECTION, POWDER, FOR SOLUTION INTRAMUSCULAR ONCE
Status: DISCONTINUED | OUTPATIENT
Start: 2020-02-06 | End: 2020-02-06

## 2020-02-06 RX ORDER — HALOPERIDOL 5 MG/1
5 TABLET ORAL 2 TIMES DAILY
Status: DISCONTINUED | OUTPATIENT
Start: 2020-02-06 | End: 2020-02-07

## 2020-02-06 RX ORDER — LORAZEPAM 2 MG/ML
2 INJECTION INTRAMUSCULAR ONCE
Status: COMPLETED | OUTPATIENT
Start: 2020-02-06 | End: 2020-02-06

## 2020-02-06 RX ORDER — BENZTROPINE MESYLATE 0.5 MG/1
0.5 TABLET ORAL 2 TIMES DAILY
Status: DISCONTINUED | OUTPATIENT
Start: 2020-02-06 | End: 2020-02-11 | Stop reason: HOSPADM

## 2020-02-06 RX ORDER — HALOPERIDOL 5 MG/ML
5 INJECTION INTRAMUSCULAR EVERY 4 HOURS PRN
Status: DISCONTINUED | OUTPATIENT
Start: 2020-02-06 | End: 2020-02-11 | Stop reason: HOSPADM

## 2020-02-06 RX ORDER — DIPHENHYDRAMINE HCL 25 MG
25-50 CAPSULE ORAL EVERY 4 HOURS PRN
Status: DISCONTINUED | OUTPATIENT
Start: 2020-02-06 | End: 2020-02-11 | Stop reason: HOSPADM

## 2020-02-06 RX ORDER — OLANZAPINE 10 MG/1
10 TABLET, ORALLY DISINTEGRATING ORAL ONCE
Status: COMPLETED | OUTPATIENT
Start: 2020-02-06 | End: 2020-02-06

## 2020-02-06 RX ORDER — LORAZEPAM 2 MG/1
2 TABLET ORAL ONCE
Status: DISCONTINUED | OUTPATIENT
Start: 2020-02-06 | End: 2020-02-06

## 2020-02-06 RX ADMIN — LORAZEPAM 2 MG: 2 INJECTION INTRAMUSCULAR; INTRAVENOUS at 15:50

## 2020-02-06 RX ADMIN — HALOPERIDOL 5 MG: 5 TABLET ORAL at 15:31

## 2020-02-06 RX ADMIN — NICOTINE POLACRILEX 2 MG: 2 GUM, CHEWING BUCCAL at 14:35

## 2020-02-06 RX ADMIN — OLANZAPINE 10 MG: 10 TABLET, ORALLY DISINTEGRATING ORAL at 14:35

## 2020-02-06 RX ADMIN — ONDANSETRON 4 MG: 4 TABLET, ORALLY DISINTEGRATING ORAL at 16:40

## 2020-02-06 ASSESSMENT — ACTIVITIES OF DAILY LIVING (ADL)
PRIOR_FUNCTIONAL_LEVEL_COMMENT: IND
DRESS: 0-->INDEPENDENT
BATHING: 0-->INDEPENDENT
HYGIENE/GROOMING: INDEPENDENT
TOILETING: 0-->INDEPENDENT
RETIRED_EATING: 0-->INDEPENDENT
LAUNDRY: UNABLE TO COMPLETE
RETIRED_COMMUNICATION: 0-->UNDERSTANDS/COMMUNICATES WITHOUT DIFFICULTY
FALL_HISTORY_WITHIN_LAST_SIX_MONTHS: NO
COGNITION: 0 - NO COGNITION ISSUES REPORTED
DRESS: INDEPENDENT
AMBULATION: 0-->INDEPENDENT
TRANSFERRING: 0-->INDEPENDENT
SWALLOWING: 0-->SWALLOWS FOODS/LIQUIDS WITHOUT DIFFICULTY
ORAL_HYGIENE: INDEPENDENT

## 2020-02-06 NOTE — ED NOTES
Bed: ED12  Expected date: 2/6/20  Expected time: 3:27 PM  Means of arrival:   Comments:  Until pt in rm 15 stops yelling

## 2020-02-06 NOTE — PHARMACY-ADMISSION MEDICATION HISTORY
Admission Medication History status for the 2/5/2020 admission is complete.  See EPIC admission navigator for Prior to Admission medications.    Medication history sources:  Sure Script[facility], Care Everywhere, and ACT worker @ [phone]     Medication history source reliability: Good    Medication adherence:  Poor  ACT worker sets up pill reminder box for patient. She has noticed that patient has medications left over and has not been taking her oral medications.     Changes made to PTA medication list (reason)  Added: Benztropine, Miralax  Deleted: None  Changed: None    Additional medication history information (including reliability of information, actions taken by pharmacist):   -unable to discuss medications with patient due to behavior  -Able to verify dose/medications through Care Everywhere and [facility]  -ACT worker gives her her Haldol Decanoate 200 mg q 28 days. Last dose was approximately 1/16/2020. This was verified with ACT team.    Time spent in this activity: 20 minutes    Medication history completed by: Jovanna Lang [facility]     Prior to Admission medications    Medication Sig Last Dose Taking? Auth Provider   albuterol (PROAIR HFA/PROVENTIL HFA/VENTOLIN HFA) 108 (90 Base) MCG/ACT inhaler Inhale 2 puffs into the lungs every 6 hours as needed for shortness of breath / dyspnea or wheezing Unknown at Unknown time  Alonso Smith MD   benztropine (COGENTIN) 0.5 MG tablet Take 0.5 mg by mouth 2 times daily Unknown at Unknown time  Unknown, Entered By History   haloperidol (HALDOL) 5 MG tablet Take 1 tablet (5 mg) by mouth 2 times daily Unknown at Unknown time  Aidee Allen MD   haloperidol decanoate (HALDOL DECANOATE) 100 MG/ML injection Inject 200 mg into the muscle every 28 days 1/16/2020  Aidee Allen MD   polyethylene glycol (MIRALAX/GLYCOLAX) packet Take 17 g by mouth daily as needed for constipation Unknown at Unknown time  Unknown, Entered By History

## 2020-02-06 NOTE — ED NOTES
"Pt is lying in bed, facing wall, woke up and started screaming \"go away, go away, fuck the illuminati, pounding wall at times\". Pt is lying in bed facing wall. She later came out of room and was redirected back in. Pt was opeing and closing door frequently and screaming.   "

## 2020-02-06 NOTE — ED NOTES
Patient states that she will cooperate if the restraints are removed and she understands the consequences of not cooperating

## 2020-02-06 NOTE — ED NOTES
Patient struck her head against the door.  She has a large frontal hematoma.  Shortly after she became agitated and required physical restraints.  While the restraints were applied, she had seizure activity, restraints were removed.  I was called to evaluate the patient, placed in recovery position. On my assessment, this does not appear to be a real seizure, she is actively closing her eyelids and does have control of her extremities.  She was redirectable.  Because of the head injury, hematoma, and questionable seizure activity, I do believe that she needs a stat head CT.  She is refusing to provide any urine or drug for a pregnancy test.  I have educated her on the risk to the fetus if she does not get this test done, she maintains that there is no way she is pregnant and does not want to have this test done.  We will continue with the CT at this time.    CT head negative on my read, official read pending.  Pt now calm and cooperative.  Will proceed with psychiatric admission       Ravi Hudson DO  02/07/20 [id number]

## 2020-02-06 NOTE — ED NOTES
Pt was woken up and given her lunch tray, she is eating in bed. Pt appears to be anxious, but was able to calm with redirection. Writer requested urine sample but pt said she does not want to use bathroom.

## 2020-02-06 NOTE — ED NOTES
Requested patient to move from the cart she was resting on to a non-electric cart for safety reasons. Patient refused, yelling at staff to get out and leave her alone. Atempted to explain to her that she would be left alone as soon as she moved to the other cart but she continued to refuse. A code green was called, patient was assisted to non-electric cart without incident. She requested to not move again and to be left alone. She is resting on cart

## 2020-02-06 NOTE — TELEPHONE ENCOUNTER
R: 12/Burak    514pm Burak called intake asking about space and caps, was presented w pt and accepted for himself on 12.   Pt placed in queue  519pm - Unit notified, told intake it wouldn't be on this shift because pt needs a 1:1 and they don't have the staffing for it tonight  524pm - ANS paged   527pm - KIARA Lopez notified of staffing concern on 12  538pm - ED notified

## 2020-02-06 NOTE — ED NOTES
Awake, hungry. Requested food and something to drink. Attempted to get patient's vital signs but she again refused to have any vital signs taken. 72 hour hold paperwork provided to patient.

## 2020-02-06 NOTE — TELEPHONE ENCOUNTER
S;  0900 per ED, pt sleeping.  Requested they try to get the Utox when she wakes.    11:40 possible bed on 22.  Case discussed with .  She requests we review with RN Mngr, Cheyne.    2:15  Cheyne and Kristi, who is familiar with the pt, feels pt needs 12 due to her hx and behavior last ector.  Hx of aggression.    2:30 pt awake, swearing and uncooperative.    2:40  No taking meds.    3:40  Code called.    Awaiting appropriate placement.  Passed to ector shift.

## 2020-02-07 ENCOUNTER — APPOINTMENT (OUTPATIENT)
Dept: CT IMAGING | Facility: CLINIC | Age: 30
End: 2020-02-07
Attending: PHYSICIAN ASSISTANT
Payer: COMMERCIAL

## 2020-02-07 PROCEDURE — 70450 CT HEAD/BRAIN W/O DYE: CPT

## 2020-02-07 PROCEDURE — 25000132 ZZH RX MED GY IP 250 OP 250 PS 637: Performed by: PSYCHIATRY & NEUROLOGY

## 2020-02-07 PROCEDURE — 72125 CT NECK SPINE W/O DYE: CPT

## 2020-02-07 PROCEDURE — 00000146 ZZHCL STATISTIC GLUCOSE BY METER IP

## 2020-02-07 PROCEDURE — 25000132 ZZH RX MED GY IP 250 OP 250 PS 637: Performed by: NURSE PRACTITIONER

## 2020-02-07 PROCEDURE — 99223 1ST HOSP IP/OBS HIGH 75: CPT | Mod: AI | Performed by: PSYCHIATRY & NEUROLOGY

## 2020-02-07 PROCEDURE — 99231 SBSQ HOSP IP/OBS SF/LOW 25: CPT | Performed by: PHYSICIAN ASSISTANT

## 2020-02-07 PROCEDURE — 12400001 ZZH R&B MH UMMC

## 2020-02-07 RX ORDER — HALOPERIDOL 10 MG/1
10 TABLET ORAL 2 TIMES DAILY
Status: DISCONTINUED | OUTPATIENT
Start: 2020-02-07 | End: 2020-02-11 | Stop reason: HOSPADM

## 2020-02-07 RX ORDER — OLANZAPINE 10 MG/1
10 TABLET, ORALLY DISINTEGRATING ORAL 3 TIMES DAILY PRN
Status: DISCONTINUED | OUTPATIENT
Start: 2020-02-07 | End: 2020-02-11 | Stop reason: HOSPADM

## 2020-02-07 RX ORDER — HALOPERIDOL 5 MG/ML
10 INJECTION INTRAMUSCULAR 2 TIMES DAILY
Status: DISCONTINUED | OUTPATIENT
Start: 2020-02-07 | End: 2020-02-11 | Stop reason: HOSPADM

## 2020-02-07 RX ORDER — OLANZAPINE 10 MG/2ML
10 INJECTION, POWDER, FOR SOLUTION INTRAMUSCULAR 3 TIMES DAILY PRN
Status: DISCONTINUED | OUTPATIENT
Start: 2020-02-07 | End: 2020-02-11 | Stop reason: HOSPADM

## 2020-02-07 RX ADMIN — HALOPERIDOL 10 MG: 10 TABLET ORAL at 19:00

## 2020-02-07 RX ADMIN — OLANZAPINE 10 MG: 10 TABLET, ORALLY DISINTEGRATING ORAL at 16:56

## 2020-02-07 RX ADMIN — LORAZEPAM 2 MG: 1 TABLET ORAL at 13:30

## 2020-02-07 RX ADMIN — LORAZEPAM 2 MG: 1 TABLET ORAL at 19:00

## 2020-02-07 RX ADMIN — DIPHENHYDRAMINE HYDROCHLORIDE 50 MG: 50 CAPSULE ORAL at 19:00

## 2020-02-07 RX ADMIN — NICOTINE POLACRILEX 4 MG: 2 GUM, CHEWING ORAL at 21:50

## 2020-02-07 RX ADMIN — ACETAMINOPHEN 650 MG: 325 TABLET, FILM COATED ORAL at 18:00

## 2020-02-07 RX ADMIN — HALOPERIDOL 5 MG: 5 TABLET ORAL at 13:30

## 2020-02-07 ASSESSMENT — ACTIVITIES OF DAILY LIVING (ADL)
ORAL_HYGIENE: INDEPENDENT
DRESS: SCRUBS (BEHAVIORAL HEALTH)
ORAL_HYGIENE: INDEPENDENT
LAUNDRY: UNABLE TO COMPLETE
LAUNDRY: UNABLE TO COMPLETE
HYGIENE/GROOMING: INDEPENDENT
HYGIENE/GROOMING: INDEPENDENT
DRESS: SCRUBS (BEHAVIORAL HEALTH)

## 2020-02-07 NOTE — PROGRESS NOTES
Writer received paperwork from [facility] for patient's continued commitment and Aranda from 10/14/19. Patient is currently refusing medications. She is Aranda'd for Haldol, Risperdal, Clozaril and Thorazine.

## 2020-02-07 NOTE — PROGRESS NOTES
"1345: Patient became very upset about not leaving. She was shouting at the door \"you are all keeping me here, I am not crazy, I don't need meds, I just need to leave and get to my daughters birthday party.\" Patient then went to stand on her bed and threaten self harm if we did not provide her with a phone. Patient was standing on her bed looking with her back facing the door. She stated, \"If you don't get me a fucking phone right now I am going to fall back and crack my head wide open. I don't care if I hurt myself of if I die.\" Phone use was negotiated and patient got off the top of the bed and stood by the door. Code 21 paged again. Patient then stood with her back to the door and pounded the back of her with force against the window. Staff shouted, take control. When this command was given, patient dropped to the floor as if she had passed out. Started to shake as if she were in acute seizure activity. Code blue was paged.   1350: Patient remained on the floor, airway remained clear. Vitals taken. Patient was not shaking, no vomit, no evidence of incontinence.   Patient was not disoriented and she started shouting, \"leave me alone, I don't need any help.\" Code team at bedside.   1410: Patient was escorted to CT scan in 5 point restraints.   1420: Code 21 called in CT scan regarding this patient.   "
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
## 2020-02-07 NOTE — PLAN OF CARE
BEHAVIORAL TEAM DISCUSSION    Participants: Glenna Velez LPC, [facility], Cy Sumner MD, Dora Chu, RN.   Progress: Initial team discussion.   Anticipated length of stay: 1-2 weeks  Continued Stay Criteria/Rationale: Patient is expressing suicidal ideation with a plan. Has been noncompliant with medications and is acutely psychotic.   Medical/Physical: Unknown.  Precautions:   Behavioral Orders   Procedures    Assault precautions    Code 1 - Restrict to Unit    Elopement precautions    Routine Programming     As clinically indicated    Self Injury Precaution    Status 15     Every 15 minutes.    Status Individual Observation     Foreign Body Ingestion.     Order Specific Question:   CONTINUOUS 24 hours / day     Answer:   5 feet     Order Specific Question:   Indications for SIO     Answer:   Self-injury risk     Order Specific Question:   Indications for SIO     Answer:   Suicide risk     Order Specific Question:   Indications for SIO     Answer:   Assault risk    Suicide precautions     Patients on Suicide Precautions should have a Combination Diet ordered that includes a Diet selection(s) AND a Behavioral Tray selection for Safe Tray - with utensils, or Safe Tray - NO utensils       Plan: Patient will receive psychosocial assessment and be evaluated for medication management by provider.   Rationale for change in precautions or plan: Patient will remain on 1:1 SIO for acute suicidal ideation.

## 2020-02-07 NOTE — PROGRESS NOTES
Patient was upset regarding not going home. She started shouting and swearing at staff. She then started to bang her head on the door. Staff initiated duress beeper, code 21 paged overhead, and patient room was entered. Patient took po meds and stated she could contract for safety at this time.
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
## 2020-02-07 NOTE — ED NOTES
"Late entry:  2/6/2020  15:45  Pt was found in the room by LICHA Gupta with the hospital bedsheet wrapped around pt's neck. Code 21 was paged. Pt was resisting PA when sheet was being taken off pt's neck. Pt did not have any LOC, no petechiae noted in sclera, no strangulating marks on pt's neck, no c/o difficulty breathing. MD Newman was updated, Lorazepam IM has been ordered - med given.  15:50  Plan was to put pt in seclusion, a few seconds after door was closed pt immediately banged her head hard on the door. Pt sat down and yelled \"aw my forehead hurts!\" a bump was immediately noted. Pt did not have an LOC but c/o being dizzy. Dr Newman was updated, ordered 5 point restraints. Pt was informed that she will be placed on restraints, pt was threatening writer, \" You will not put me on restraints, I will kill you!\" Pt then tried to lunged at writer, code team immediately took pt down.  16:05  While code team was still in the process of restraining pt, pt started spitting at everyone. Spit snider put in place. Pt continued to spit and struggled with the staffs while restraints was put in place  16:07  Spit snider taken out since pt was getting more agitated c/o of having a hard time breathing, pt yelling out \" I promise I will not spit! Take it out, take it out!  16:10  Restraints all in place. Few seconds after all the restraints were in place, pt c/o \"my feet hurts, it's too tight!\" Upon checking the restraints on pt's feet, pt's whole body started seizing. Restraints immediately taken out. MD and Charge RN Braeden were called asap. Pt was awake, forcing eyes shut when being assessed. After assessment MD/Charge RN stated that pt was having pseudoseizure.   16:25  Pt was sitting up in bed c/o nausea & then started vomiting. MD ordered Zofran ODT & CT head.  Pt refusing to give urine sample or have blood taken for pregnancy test. Pt adamant that she is not pregnant \" I have not been with a man for many years now! How can I be " "pregnant!\" Message relayed to MD, per MD pt can have CT imaging done without the pregnancy test.   17:50  Pt brought to CT with Code green team. Pt was calm and cooperative at the imaging dept.         "

## 2020-02-07 NOTE — PROGRESS NOTES
"Patient returned from CT scan. She is in her room in 5 point restraints. Waiting on CT results. Patient is yelling \"Fuck the [employer].\"   "

## 2020-02-07 NOTE — PROGRESS NOTES
Writer received phone call from Carolyn ([phone]), patients ACT team nurse through [facility]. She reports that the team is revoking the patients provisional discharge as a result of her noncompliance with medications and recent psychiatric decompensation. She also provided writer with the phone number for Roxann ([phone]), patients ACT team CM.
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
## 2020-02-07 NOTE — PROVIDER NOTIFICATION
"   02/07/20 1600   Seclusion or Restraint Order   Length of Order 4   Order Obtained Yes   Attending Physician Notified Yes   Attending Physician's Name Dr. Sumner   Leadership   Seclus/Restraint >12H or 2x/24H Notified   Assessment   Less Restrictive Alternative Decreased stimulation;Verbal de-escalation;Reality orientation;Modified programming;Reassurance / Support   Risk Factors CI   Justification   Clinical Justification All     Patient was in 5 point restrains, on a gurney as she was awaiting medical clearance from her headbanging.  Patient started to shift her weight back and forth violently, in an attempt to capsize the gurney.  Patient was told to stop, but patient continued, yelling \"Fuck you! I want to die!\".  Unit staff stabilized the gurney to prevent the gurney from capsizing.  A code 21 was paged.  Staff were gowned, but patient spat on staff.  When the code team was assembled, patient was transferred via backboard to her bed and replaced into 5 points on a heavy plexy board under a mattress, to keep patient safe from injury.      As the restraints were being placed, patient was yelling threatening statements at staff \"I am going to fuck you up when I am out of here\" and \"I am going to beat your ass\".  She was physically struggling against staff.  Patient then became quiet and began to physically shake.  Due to a concern of a seizure, restraints were quickly removed and patient was turned to a recovery position.  A code blue was paged.  VS were taken (see charting).  Patient continued shaking for a minute.  She did not answer questions from staff.  After one minute, patient started yelling at staff and became was making verbal threats to staff.  She remained in the recovery position.    When the code team arrived, patient stopped yelling at staff and started to shake.  Patient was assessed by the code blue team (see charting).  Of note, patient refused a blood draw \"You aren't taking my blood! I already " "gave blood!\" and an EKG \"Don't touch me!\"    After the code team, it was deemed safe to resume placing restraints, due to patient's threatening statements.  As restrains were again replaced, patient made specific threats towards unit nurses \"I am going to beat that blonde bitch!\".      Patient was given restraint discontinuation criteria, but patient responded by yelling \"Fatass bitch! FATASS BITCH!\"    Dr. Sumner was notified of the restraint and code blue. An order for restraint was placed starting at 1600.  Dr. Sumner spoke with internal medicine.     An order for additional PRN medications (Zyprexa) was placed, as patient was still acutely agitated despite recent PRN medications targeting agitation.    "

## 2020-02-07 NOTE — PROGRESS NOTES
02/06/20 2136   Patient Belongings   Did you bring any home meds/supplements to the hospital?  No   Patient Belongings locker;sent to security per site process   Patient Belongings Put in Hospital Secure Location (Security or Locker, etc.) cell phone/electronics;clothing;glasses;money (see comment);shoes   Belongings Search Yes   Clothing Search Yes     Items in pt locker:  Black boots  Glasses  Plaid scarf  Cigarettes  Lighter  Green winter jacket  Phone   Phone  Misc. Papers    Items sent to security in envelope # [phone]  Ten dollars and 25 cents ($10.25) cash    A               Admission:  I am responsible for any personal items that are not sent to the safe or pharmacy.  [facility] is not responsible for loss, theft or damage of any property in my possession.    Signature:  _________________________________ Date: _______  Time: _____                                              Staff Signature:  ____________________________ Date: ________  Time: _____      2nd Staff person, if patient is unable/unwilling to sign:    Signature: ________________________________ Date: ________  Time: _____     Discharge:  [facility] has returned all of my personal belongings:    Signature: _________________________________ Date: ________  Time: _____                                          Staff Signature:  ____________________________ Date: ________  Time: _____

## 2020-02-07 NOTE — CONSULTS
Brief Medicine Note/Code    Contacted by unit regarding code blue,  Seizure-like activity.  Please see also prior code note.  This is the second episode today which follows patient's CT images which showed no brain injury, lesion, hemorrhage, fractures, subluxation.    Patient was noted to have a 1 minute episode of mildly tonic-clonic behaviors and was not responsive while being placed in five-point restraints in prone position.  In between episodes patient was able to speak to staff fluently and did not have a postictal state.  Patient had a second episode during our encounter during which time she did not exhibit any tonic-clonic motions and head eyes forcibly shot with her eyes rolled to the back of her head and was not responsive to voice or pain.    Patient reports that her seizures are brought on by anxiety and emotions.  She noted while being placed in restraints that this would cause a seizure    Patient endorses pain of her forehead where she encouraged prior soft tissue injury from forcibly hitting her head against a wall repeatedly.  She does not endorse any changes to her vision.  She is visibly agitated and yelling at staff.    Patient denies any sensation of blood in the nose or broken or lost teeth.    Today's vital signs, medications, and nursing notes were reviewed. Labs patient refused.     /88   Pulse 114   Temp 98.2  F (36.8  C)   Resp 18   SpO2 98%   General: A&O to self and place. NAD.  Episode of nonresponsiveness to pain lasting less than 1 minute without tonic-clonic motions.  NAD  CV: RRR  Chest: CTA bilaterally  HEENT: Forehead bump without erythema, normocephalic, atraumatic no apparent bleeding of the nares or mouth.  Voice clear.  Neck supple  Extremities: Atraumatic, moves all extremities        A/P:  Question of seizure behavior  Cannot rule out a seizure at this time more likely is consistent with pseudoseizure due to lack of postictal state and in consideration of  emotional and behavioral precedents to seizure activity. Pt has a history of similar seizure-like activity noted on a prior psychiatric admission.  Unfortunately patient is refusing EKG and laboratories at this time and remains in five-point restraints.  -Patient was discussed with neurology who notes they will be ready to move forward with video EEG if the patient is able.    -Per discussion with the psychiatry team including the patient's psychiatrist at this point she is not psychiatrically stable or safe to be transferred to the medical floor and continues to require five-point restraint and may need sedation.  -per neurology: Patient may need loading with antiepileptic such as Keppra 2 g  -Limit use of benzodiazepines  -Continue to call CODE BLUE in the event patient has unstable vital signs, respiratory distress especially hypoxia, or concerning injuries or events.    Medicine will follow up with neurology recommendations which will be assessed tomorrow.    Elizabet Briceno PA-C  Hospitalist Service  Pager: [phone]   98.4

## 2020-02-07 NOTE — PROGRESS NOTES
Brief Medicine Note/Code    Medicine responded to code blue on station 12.  Pt noted to have head-banging behaviors and was observed by staff to have seizure-like activity, unclear if this was a true seizure. CT yesterday was done for head trauma during similar behavior and was negative for acute pathologies including fractures or bleeding.    Patient was not observed to have a loss of consciousness event and was not observed to have.  Of apnea changes and breathing.  Patient is not on anticoagulant or antiplatelet medications.    Patient reported pain over the forehead just superior to the brow ridge.  She denied other pain or complaints.    Today's vital signs, medications, and nursing notes were reviewed. Labs reviewed.     /62   Pulse 115   Temp 98.2  F (36.8  C)   Resp 18   SpO2 95%   General: A&O.  Agitated, thrashing behaviors, able to sit upright independently, NAD  Chest: CTA B, nonlabored breathing  Cardiovascular: RRR  HEENT: + mild swelling of the globella, Normocephalic, no lacerations, step-offs, crepitus, EOMI, no other facial lesions or bleeding, no blood in the mouth  Extremities: Moves all extremities  Psych: agitated and combative, placed in 5-point restraint by staff during assessment    A/P:  Blunt trauma to head (head banging repeatedly into wall)  CT head is again negative other than the previously seen area of swelling soft tissue surrounding the frontal bone that is mildly improved  CT of the cervical spine per radiology interpretation is negative for fractures or subluxation    Medicine team will sign off at this time, please call for questions or concerns    Elizabet Briceno PA-C  Hospitalist Service  Pager: [phone]

## 2020-02-07 NOTE — PROVIDER NOTIFICATION
"   02/07/20 1615   Seclusion or Restraint Order   In Person Face to Face Assessment Conducted Yes-Eval of pt's immediate situation, reaction to intervention, complete review of systems assessment, behavioral assessment & review/assessment of hx, drugs & meds, recent labs, etc, behavioral condition, need to continue/terminate restraint/seclusion   Patient Experienced No adverse physical outcome from seclusion/restraint initiation   Continuation of Seclus/Restraint indicated at this time Yes   Pt is screaming obscenities and insults at staff and struggling against restraints. When asked about any pain or discomfort, she yelled \"fuck you fat bitch!\" When writer repeated question about pain, pt responded \"even if I did, I wouldn't tell you guys!\" Pt also observed to say \"you can't put me in restraints after I just had a seizure!\" All lab results, medications orders, and physician orders reviewed. MD notified of face to face results.   "

## 2020-02-08 PROCEDURE — 12400001 ZZH R&B MH UMMC

## 2020-02-08 PROCEDURE — 25000132 ZZH RX MED GY IP 250 OP 250 PS 637: Performed by: PSYCHIATRY & NEUROLOGY

## 2020-02-08 PROCEDURE — 25000132 ZZH RX MED GY IP 250 OP 250 PS 637: Performed by: NURSE PRACTITIONER

## 2020-02-08 RX ADMIN — HALOPERIDOL 10 MG: 10 TABLET ORAL at 20:42

## 2020-02-08 RX ADMIN — HALOPERIDOL 10 MG: 10 TABLET ORAL at 12:26

## 2020-02-08 RX ADMIN — LORAZEPAM 2 MG: 1 TABLET ORAL at 12:26

## 2020-02-08 RX ADMIN — BENZTROPINE MESYLATE 0.5 MG: 0.5 TABLET ORAL at 20:42

## 2020-02-08 RX ADMIN — ACETAMINOPHEN 650 MG: 325 TABLET, FILM COATED ORAL at 21:21

## 2020-02-08 RX ADMIN — BENZTROPINE MESYLATE 0.5 MG: 0.5 TABLET ORAL at 12:26

## 2020-02-08 ASSESSMENT — ACTIVITIES OF DAILY LIVING (ADL)
HYGIENE/GROOMING: INDEPENDENT
DRESS: SCRUBS (BEHAVIORAL HEALTH)
ORAL_HYGIENE: INDEPENDENT
LAUNDRY: UNABLE TO COMPLETE

## 2020-02-08 NOTE — CONSULTS
[facility]  Neurology Consult  2/8/2020      Ilene Liu MRN# [id number]   YOB: 1990 Age: 30 year old      Date of Admission:  2/5/2020    Primary care provider: No Ref-Primary, Physician    Requesting physician:  Elizabet Briceno PA-C    Reason for Consult: Seizure like activity         Assessment and Recommendations:   Assessment: Ilene Liu is a 30 year old female currently admitted to inpatient psychiatry with a history of substance abuse, schizoaffective disorder, and self-injurious behavior who neurology was consulted for seizure-like activity.   I suspect based on the description of the lack of postictal state and extraocular muscle control, as well as different spells occurring each time (not stereotyped) but this is less likely to be epileptic seizures.  It is unclear if her twitching episode after head-banging could have been an epileptic seizure, however if it was I suspect it was provoked by trauma and may not require long-term antiepileptics in any case.   I discussed case with hospitalist PA who is with inpatient psychiatry team. Ideally, I do think it would be reasonable to bring over to [facility] to consider long-term video EEG monitoring, however psychiatry rightly has concerns that patient will be able to tolerate or allow EEG monitoring.   Her more pressing concern at present is her decompensated psychiatric status and they recommended she stay over on inpatient psychiatry at this time which I think is reasonable given lower suspicion for epileptic events.  Ideally we can complete seizure work-up including MRI and EEG at some point although I agree I do not think she would tolerate right now.  This hopefully could be completed in the future.  Patient's exam is quite limited due to participation but appears from what I can tell is nonfocal and generally reassuring.  Would not start any antiepileptics at this time, unless considering using  for psychiatric reasons as well in which case lamotrigine or valproate may be considered.  Would avoid benzodiazepines if possible unless spells are more clearly generalized tonic clonic in nature, or prolonged episode.  Also definitely would consider more aggressive treatment if patient is becoming vitally unstable.  Neurology will sign off at this time, if spells continue and think patient can tolerate video EEG monitoring please reconsult.  If spells continue to be recurrent and unable to characterize and is refractory to treatment, would consider NMDA testing in the future but do not feel indicated right now with her more prolonged psychiatric history.      Recommendations:  - MRI brain and routine EEG when patient stable to allow  -Would avoid benzodiazepines for the spells and less more clearly epileptic in nature or patient becoming vitally unstable.  -Would not load with antiepileptic at this time, as event most concerning for epilepsy was likely provoked by head trauma.    Kin Condon DO   of Neurology              Chief Complaint:   Seizures       History is obtained from the patient and/or family and medical record      Ilene Liu is a 30 year old female who is currently under commitment who was admitted due to concern for psychosis.  She was noted to be acting paranoid reporting suicidal thoughts.  She was also engaging in self-injurious behavior including head banging, after which she was noted to have some generalized shaking activity.   Notes indicate the emergency room doctor thought this may be nonepileptic due to the fact that she was able to control her eyes during the event.  Yesterday, patient had a CODE BLUE called for seizure activity.  Again she had intentionally hit her head and was noted to exhibit approximately 1 minute of subtle twitching of her extremities.  I spoke with medicine PA who denied large tonic-clonic or rigid rigid component.  This lasted  approximately minute with no real postictal state.  Shortly after this she had a second episode of which she forcibly closed her eyes and did not respond to voice or pain not associated with any twitching or tonic movements.   She told them that her seizures are brought on by anxiety or emotions and that placing her restraints would bring out a seizure.    Patient's behavior is been quite difficult to control this admission and she has been demanding to leave and resisting cares.   For me this morning she reports she thinks she had a seizure yesterday and one 3 months ago.  She denies seizures prior, or family history of seizures.  She does have frequent head trauma noted above but no history of intracranial processes including meningitis.  Patient denied pain, and stated she just wanted to sleep and refused to answer remainder of history questions.            Past Medical History:     Past Medical History:   Diagnosis Date     Asthma      Cocaine abuse      Depressive disorder      Marijuana use      Schizo-affective type schizophrenia               Past Surgical History:     Past Surgical History:   Procedure Laterality Date     ESOPHAGOSCOPY, GASTROSCOPY, DUODENOSCOPY (EGD), COMBINED N/A 12/17/2018    Procedure: COMBINED ESOPHAGOSCOPY, GASTROSCOPY, DUODENOSCOPY (EGD);  Surgeon: Cassie Rahman MD;  Location:  GI             Social History:     Social History     Socioeconomic History     Marital status: Single     Spouse name: Not on file     Number of children: Not on file     Years of education: Not on file     Highest education level: Not on file   Occupational History     Not on file   Social Needs     Financial resource strain: Not on file     Food insecurity:     Worry: Not on file     Inability: Not on file     Transportation needs:     Medical: Not on file     Non-medical: Not on file   Tobacco Use     Smoking status: Current Some Day Smoker     Packs/day: 0.25     Smokeless tobacco: Never Used  "  Substance and Sexual Activity     Alcohol use: Yes     Comment: not currently drinking anything     Drug use: Never     Types: Marijuana, \"Crack\" cocaine     Sexual activity: Not Currently   Lifestyle     Physical activity:     Days per week: Not on file     Minutes per session: Not on file     Stress: Not on file   Relationships     Social connections:     Talks on phone: Not on file     Gets together: Not on file     Attends Oriental orthodox service: Not on file     Active member of club or organization: Not on file     Attends meetings of clubs or organizations: Not on file     Relationship status: Not on file     Intimate partner violence:     Fear of current or ex partner: Not on file     Emotionally abused: Not on file     Physically abused: Not on file     Forced sexual activity: Not on file   Other Topics Concern     Not on file   Social History Narrative     Not on file             Family History:   History reviewed. No pertinent family history.          Immunizations:     There is no immunization history on file for this patient.         Allergies:    No Known Allergies          Medications:     Prescription Medications as of 2/8/2020       Rx Number Disp Refills Start End Last Dispensed Date Next Fill Date Owning Pharmacy    albuterol (PROAIR HFA/PROVENTIL HFA/VENTOLIN HFA) 108 (90 Base) MCG/ACT inhaler  1 Inhaler 0 6/23/2019        Sig: Inhale 2 puffs into the lungs every 6 hours as needed for shortness of breath / dyspnea or wheezing    Class: Local Print    Route: Inhalation    benztropine (COGENTIN) 0.5 MG tablet            Sig: Take 0.5 mg by mouth 2 times daily    Class: Historical    Route: Oral    haloperidol (HALDOL) 5 MG tablet  60 tablet 0 7/16/2019    [place], [place] - [address]    Sig: Take 1 tablet (5 mg) by mouth 2 times daily    Class: E-Prescribe    Route: Oral    haloperidol decanoate (HALDOL DECANOATE) 100 MG/ML injection    7/16/2019        Sig: Inject 200 " "mg into the muscle every 28 days DUE 7/12/19    Class: No Print Out    Route: Intramuscular    polyethylene glycol (MIRALAX/GLYCOLAX) packet            Sig: Take 17 g by mouth daily as needed for constipation    Class: Historical    Route: Oral      Hospital Medications as of 2/8/2020       Dose Frequency Start End    acetaminophen (TYLENOL) tablet 650 mg 650 mg EVERY 4 HOURS PRN 2/6/2020     Admin Instructions: Do not use if the patient has significant liver disease. MAX acetaminophen = 4000 mg/24 hrs.  MAX acetaminophen <3000 mg/24 hrs for patients > or = 65 years old.<BR>Maximum acetaminophen dose from all sources = 75 mg/kg/day not to exceed 4 grams/day.    Class: E-Prescribe    Route: Oral    albuterol (PROAIR HFA/PROVENTIL HFA/VENTOLIN HFA) 108 (90 Base) MCG/ACT inhaler 2 puff 2 puff EVERY 6 HOURS PRN 2/6/2020     Class: E-Prescribe    Route: Inhalation    benztropine (COGENTIN) tablet 0.5 mg 0.5 mg 2 TIMES DAILY 2/6/2020     Class: E-Prescribe    Route: Oral    diphenhydrAMINE (BENADRYL) capsule 25-50 mg 25-50 mg EVERY 4 HOURS PRN 2/6/2020     Class: E-Prescribe    Route: Oral    Linked Group 1:  \"Or\" Linked Group Details        diphenhydrAMINE (BENADRYL) injection 25-50 mg 25-50 mg EVERY 4 HOURS PRN 2/6/2020     Admin Instructions: For ordered IV doses 1-50 mg, give IV Push undiluted. Give each 25mg over a minimum of 1 minute. Extend in non-emergency    Class: E-Prescribe    Route: Intramuscular    Linked Group 1:  \"Or\" Linked Group Details        haloperidol (HALDOL) tablet 10 mg 10 mg 2 TIMES DAILY 2/7/2020     Class: E-Prescribe    Route: Oral    Linked Group 2:  \"Or\" Linked Group Details        haloperidol (HALDOL) tablet 5 mg 5 mg EVERY 4 HOURS PRN 2/6/2020     Class: E-Prescribe    Route: Oral    Linked Group 3:  \"Or\" Linked Group Details        haloperidol lactate (HALDOL) injection 10 mg 10 mg 2 TIMES DAILY 2/7/2020     Admin Instructions: This is a Aranda medication. Give IM if patient refuses oral " "dose.<BR>For ordered doses up to 5 mg, drug can be give IV Push undiluted over 1 minute.    Class: E-Prescribe    Route: Intramuscular    Linked Group 2:  \"Or\" Linked Group Details        haloperidol lactate (HALDOL) injection 5 mg 5 mg EVERY 4 HOURS PRN 2/6/2020     Admin Instructions: For ordered doses up to 5 mg, drug can be give IV Push undiluted over 1 minute.    Class: E-Prescribe    Route: Intramuscular    Linked Group 3:  \"Or\" Linked Group Details        hydrOXYzine (ATARAX) tablet 25 mg 25 mg EVERY 4 HOURS PRN 2/6/2020     Admin Instructions: Administer only if there is no other oral medication ordered prn for anxiety,    Class: E-Prescribe    Route: Oral    LORazepam (ATIVAN) injection 1-2 mg 1-2 mg EVERY 4 HOURS PRN 2/6/2020     Admin Instructions: This drug may cause significant respiratory depression.  Monitor respiratory status and vital signs carefully for 1 hour after each dose.    Class: E-Prescribe    Route: Intramuscular    Linked Group 4:  \"Or\" Linked Group Details        LORazepam (ATIVAN) tablet 1-2 mg 1-2 mg EVERY 4 HOURS PRN 2/6/2020     Class: E-Prescribe    Route: Oral    Linked Group 4:  \"Or\" Linked Group Details        nicotine (NICORETTE) gum 2-4 mg 2-4 mg EVERY 1 HOUR PRN 2/6/2020     Admin Instructions: Chew until tingling, then place between cheek and gum.  <BR>Repeat.  <BR>Do not swallow.  <BR>Not to exceed 48 mg in a 24 hour time period.    Class: E-Prescribe    Route: Buccal    OLANZapine (zyPREXA) injection 10 mg 10 mg 3 TIMES DAILY PRN 2/7/2020     Admin Instructions: DO NOT GIVE WITHIN 1 HOUR OF IM ATIVAN<BR>Dissolve the contents of the 10 mg vial using 2.1 mL of Sterile Water for Injection to provide a solution containing 5 mg/mL of olanzapine. Withdraw the ordered dose from vial. Use immediately (within 1 hour) after reconstitution.  Discard any unused portion.    Class: E-Prescribe    Route: Intramuscular    Linked Group 5:  \"Or\" Linked Group Details        OLANZapine zydis " "(zyPREXA) ODT tab 10 mg 10 mg 3 TIMES DAILY PRN 2/7/2020     Admin Instructions: Combined IM and PO doses may significantly increase the risk of orthostatic hypotension at 30 mg per day or higher.<BR>With dry hands, peel back foil backing and gently remove tablet. Do not push oral disintegrating tablet through foil backing. Administer immediately on tongue and oral disintegrating tablet dissolves in seconds, then swallow with saliva. Liquid not required.    Class: E-Prescribe    Route: Oral    Linked Group 5:  \"Or\" Linked Group Details        polyethylene glycol (MIRALAX/GLYCOLAX) Packet 17 g 17 g DAILY PRN 2/6/2020     Admin Instructions: 1 Packet = 17 grams. Mix each gram with at least 1/2 ounce (15 mL) of water - <BR>8 ounces  for 17 g dose, 4 ounces for 8.5 g dose, 2 ounces for 4 g dose.<BR>Follow with the same volume of water.<BR>Hold for loose stools.<BR>    Class: E-Prescribe    Route: Oral                Review of Systems:   Review of systems not obtained due to patient factors - lack of cooperation         Physical Exam:   /88   Pulse 114   Temp 98.2  F (36.8  C)   Resp 18   SpO2 98%    Physical Exam:   General: NAD  HEENT: sclera anicteric  Resp: Breathing comfortably, no respiratory distress  CVS: mildly tachycardic  Skin: warm and dry  Extremities:Atraumatic  Neurologic:  Neurologic exam is quite limited due to patient participation.  She is awake, alert, oriented to person place and month and year.  She is not oriented today.  Her speech is fluent without dysarthria.  She is able to name appropriately.  Extraocular muscles appear intact but she does not follow for full testing.  Face is grossly symmetric.   strength is equal bilaterally and she is moving all 4 extremities equal and antigravity.  She does move to pain in all 4 extremities.  Upper extremity deep tendon reflexes intact, but patient became frustrated and would not allow further reflex testing.  Unable to test coordination and " gait due to patient participation.          Data:   CBC:  Lab Results   Component Value Date    WBC 4.2 11/16/2019     Lab Results   Component Value Date    HGB 14.9 11/16/2019     Lab Results   Component Value Date    HCT 43.0 11/16/2019     Lab Results   Component Value Date     11/16/2019       Last Basic Metabolic Panel:  Lab Results   Component Value Date     11/16/2019      Lab Results   Component Value Date    POTASSIUM 3.8 11/16/2019     Lab Results   Component Value Date    CHLORIDE 104 11/16/2019     Lab Results   Component Value Date    CONG 9.2 11/16/2019     Lab Results   Component Value Date    CO2 21 11/16/2019     Lab Results   Component Value Date    BUN 11 11/16/2019     Lab Results   Component Value Date    CR 0.48 11/16/2019     Lab Results   Component Value Date    GLC 99 11/16/2019       Head CT: Personally reviewed, small frontal scalp hematoma.  No other acute intracranial process seen.          This note was transcribed with Dragon Dictation software.  Please excuse any typos.

## 2020-02-08 NOTE — PLAN OF CARE
Problem: Restraint/Seclusion for Violent Self-Destructive Behavior  Goal: Prevent future episodes of restraint or seclusion  Description  Identify nonphysical alternatives to restraint or seclusion.  Identify additional de-escalation supportive measures to use as alternatives to restraint or seclusion.  Outcome: No Change    Patient has not had a 24 hour period without restraint or seclusion. She has been somewhat cooperative today. She has been isolative to her room much of the shift. She has been medication compliant today. She refused lab draw this AM. She has been cooperative with her 2:1 staff. She is able to adhere to safety on the unit today. Patient reports no pain discomfort today. No s/s of acute trauma noted. Patient is able to make needs known. Alert, orient x3. Ate lunch. No physical complaints noted. Denies AH/VH. Denies MH symptoms today.

## 2020-02-08 NOTE — PROVIDER NOTIFICATION
"   02/07/20 1729   Restraint Type   Wrist - R (BH) Discontinued   Wrist - L (BH) Discontinued   Ankle - R (BH) Discontinued   Ankle - L (BH) Discontinued   Chest (BH) Discontinued   Debriefing   Debriefing DO   Does patient understand why the event happened? Yes   Does patient agree to safe behaviors? Yes   What can we do differently so this doesn't happen again? Other (comment)  (will ask for medications)   Plan of care reviewed and modified Yes  (additional PRN medications)     At approx 1650 patient continued to yell \"Fuck you fatass!\".  She was verbally threatening while in 5-patient restraints.  Provided verbal de-escalation, which was partially effective.  Writer restated seclusion discontinuation criteria.  Patient agreed to take PO PRN Zydis to target agitation and then demonstrate safe behaviors for 30 minutes.    Patient was able to demonstrate safe behaviors for 30 minutes.  She contracted to be safe, stating she would not hurt herself, or anyone else.  A code green was paged and patient was removed from restraints.  Patient used the bathroom.  She was provided toothpaste (applied to her finger due to patient's hx of swallowing foreign objects) and 2 handtowels with liquid soap applied by staff.  Patient attended to ADLs.  She was given clean scrubs.  Patient ate supper.  She was given 1 green crayon and 5 sheets of paper to journal, as she identified that as a coping tool for stress and anxiety.    Patient continues on SIO 2:1.  "

## 2020-02-08 NOTE — PROGRESS NOTES
During my attempted visit, pt sleep.      Will continue to provide support to pt/family during their hospitalization at least 1x/wk.

## 2020-02-08 NOTE — PLAN OF CARE
Problem: Suicidal Behavior  Goal: Suicidal Behavior is Absent or Managed  Outcome: Improving     Patient had a poor evening.  She required restraint and seclusion for self-injury attempts and aggressive behaviors towards staff (verbal threats, spitting at staff, attempting to scratch staff).  Patient is on SIO 2:1 with a 5ft space restriction due to aggressive and self-injurious behaviors.  She is AOx4.  She presents as tense and labile, with poor frustration tolerance - specifically when her demands are not met, or she is disturbed by peers on the unit.  She perseverates on discharge, but was accepting she will need to speak to her doctor on Monday.  Patient is focused on the  Illuminati  stating that  There are Illuminati everywhere and they want to get us .  At this point, she denied thoughts of self harm, suicidal ideation, or homicidal ideation stating  I will be nice to everyone .  Patient has minimal belongings in her room, as she has a hx of impulsively swallowing foreign objects.  She has 1 crayon to utilize for journaling.    Patient accepted PRN Zyprexa for agitation at 1700.  At 1900, patient was becoming agitated due to a peer talking on the phone (outside patient's room), so she requested PRN Benadryl and Ativan in addition to her HS Haldol. Patient took those medications with no concerns.  Refused Cogentin.    Patient refused VS.  She is being followed by IM for seizure like activity x 3 today.  A neuro consult was placed.  Refused neuro checks.  Seizure pads were placed around her bed.  She is refusing requested blood draws and EKG.  She endorsed a  headache  and accepted PRN Tylenol.

## 2020-02-09 PROCEDURE — 25000132 ZZH RX MED GY IP 250 OP 250 PS 637: Performed by: PSYCHIATRY & NEUROLOGY

## 2020-02-09 PROCEDURE — 25000132 ZZH RX MED GY IP 250 OP 250 PS 637: Performed by: NURSE PRACTITIONER

## 2020-02-09 PROCEDURE — 12400001 ZZH R&B MH UMMC

## 2020-02-09 RX ADMIN — LORAZEPAM 2 MG: 1 TABLET ORAL at 17:26

## 2020-02-09 RX ADMIN — HALOPERIDOL 10 MG: 10 TABLET ORAL at 19:23

## 2020-02-09 RX ADMIN — HALOPERIDOL 5 MG: 5 TABLET ORAL at 17:26

## 2020-02-09 RX ADMIN — HALOPERIDOL 10 MG: 10 TABLET ORAL at 10:02

## 2020-02-09 RX ADMIN — BENZTROPINE MESYLATE 0.5 MG: 0.5 TABLET ORAL at 10:02

## 2020-02-09 RX ADMIN — NICOTINE POLACRILEX 4 MG: 2 GUM, CHEWING ORAL at 17:29

## 2020-02-09 RX ADMIN — BENZTROPINE MESYLATE 0.5 MG: 0.5 TABLET ORAL at 19:23

## 2020-02-09 RX ADMIN — NICOTINE POLACRILEX 4 MG: 2 GUM, CHEWING ORAL at 11:30

## 2020-02-09 ASSESSMENT — ACTIVITIES OF DAILY LIVING (ADL)
LAUNDRY: UNABLE TO COMPLETE
DRESS: SCRUBS (BEHAVIORAL HEALTH)
ORAL_HYGIENE: INDEPENDENT
HYGIENE/GROOMING: INDEPENDENT

## 2020-02-09 NOTE — PLAN OF CARE
"  Problem: Restraint/Seclusion for Violent Self-Destructive Behavior  Goal: Prevent future episodes of restraint or seclusion  Description  Identify nonphysical alternatives to restraint or seclusion.  Identify additional de-escalation supportive measures to use as alternatives to restraint or seclusion.  Outcome: Improving    Patient has had a good shift today. She has been medication compliant. She requested a shower and has been walking in the halls. She is able to maintain unit safety. She requested to use headphone while pacing. She was given 30 minutes of headphone use and returned them without difficulty. She did state that she wants to go home and \"I won't be acting out anymore, I need to get out of here.\" She denies AH/VH today. Continues on 2:1 status.      "

## 2020-02-09 NOTE — PROGRESS NOTES
"Pt remains on SIO 2:1 due to level of aggression and significant SIB behaviors since admission. Pt slept for most of the shift, waking only to use the bathroom. She was compliant with scheduled medications at 2030 and accepted snacks. Reports that she feels \"tired\" and just wants to sleep. Refused VS assessment. Guarded and quiet on approach, and initially declined to participate in mental health assessment. She later requested prn Tylenol for headache, and has an ice pack. Pt was more agreeable to questions at this time. She denies SI/SIB and HI and states she can remain safe. Pt asked \"do you think I'll be able to go home on Monday?\" Writer explained that this would need to be discussed with Dr Sumner on Monday. Encouraged her to show safe and calm behaviors, which would be beneficial when discussing discharge with MD.  She remains on finger food diet. All items have been removed from her room due to swallow risk. No SIB observed this evening.   "

## 2020-02-10 PROBLEM — F98.4 HEAD BANGING: Status: ACTIVE | Noted: 2020-02-10

## 2020-02-10 PROBLEM — T14.91XA SUICIDE ATTEMPT (H): Status: RESOLVED | Noted: 2019-05-16 | Resolved: 2020-02-10

## 2020-02-10 PROBLEM — F44.5 PSYCHIATRIC PSEUDOSEIZURE: Status: ACTIVE | Noted: 2020-02-10

## 2020-02-10 PROBLEM — S09.90XA INJURY OF HEAD, INITIAL ENCOUNTER: Status: ACTIVE | Noted: 2020-02-10

## 2020-02-10 PROBLEM — R45.850 HOMICIDAL BEHAVIOR: Status: RESOLVED | Noted: 2019-07-10 | Resolved: 2020-02-10

## 2020-02-10 PROBLEM — R45.851 SUICIDAL IDEATION: Status: ACTIVE | Noted: 2020-02-10

## 2020-02-10 PROBLEM — Z72.89 SELF-INJURIOUS BEHAVIOR: Status: ACTIVE | Noted: 2020-02-10

## 2020-02-10 PROBLEM — F25.0 SCHIZOAFFECTIVE DISORDER, BIPOLAR TYPE (H): Status: ACTIVE | Noted: 2018-11-28

## 2020-02-10 PROBLEM — F14.10 COCAINE USE DISORDER (H): Status: ACTIVE | Noted: 2018-11-28

## 2020-02-10 PROBLEM — F17.213 CIGARETTE NICOTINE DEPENDENCE WITH WITHDRAWAL: Status: ACTIVE | Noted: 2018-12-11

## 2020-02-10 PROBLEM — S00.03XA HEMATOMA OF FRONTAL SCALP: Status: ACTIVE | Noted: 2020-02-10

## 2020-02-10 PROBLEM — Z91.148 NONCOMPLIANCE WITH MEDICATION REGIMEN: Status: ACTIVE | Noted: 2018-11-20

## 2020-02-10 PROBLEM — F12.20 CANNABIS USE DISORDER, MODERATE, DEPENDENCE (H): Status: ACTIVE | Noted: 2018-12-11

## 2020-02-10 LAB — GLUCOSE BLDC GLUCOMTR-MCNC: 114 MG/DL (ref 70–99)

## 2020-02-10 PROCEDURE — 12400001 ZZH R&B MH UMMC

## 2020-02-10 PROCEDURE — 99233 SBSQ HOSP IP/OBS HIGH 50: CPT | Performed by: PSYCHIATRY & NEUROLOGY

## 2020-02-10 PROCEDURE — 25000132 ZZH RX MED GY IP 250 OP 250 PS 637: Performed by: NURSE PRACTITIONER

## 2020-02-10 PROCEDURE — 25000132 ZZH RX MED GY IP 250 OP 250 PS 637: Performed by: PSYCHIATRY & NEUROLOGY

## 2020-02-10 PROCEDURE — 25000128 H RX IP 250 OP 636: Performed by: PSYCHIATRY & NEUROLOGY

## 2020-02-10 PROCEDURE — H2032 ACTIVITY THERAPY, PER 15 MIN: HCPCS

## 2020-02-10 RX ORDER — HALOPERIDOL 10 MG/1
10 TABLET ORAL 2 TIMES DAILY
Qty: 60 TABLET | Refills: 0 | Status: SHIPPED | OUTPATIENT
Start: 2020-02-10

## 2020-02-10 RX ORDER — BENZTROPINE MESYLATE 0.5 MG/1
0.5 TABLET ORAL 2 TIMES DAILY
Qty: 60 TABLET | Refills: 0 | Status: SHIPPED | OUTPATIENT
Start: 2020-02-10

## 2020-02-10 RX ORDER — LORAZEPAM 1 MG/1
1 TABLET ORAL 2 TIMES DAILY PRN
Qty: 10 TABLET | Refills: 0 | Status: ON HOLD | OUTPATIENT
Start: 2020-02-10 | End: 2020-04-11

## 2020-02-10 RX ORDER — HALOPERIDOL DECANOATE 100 MG/ML
200 INJECTION INTRAMUSCULAR
Status: ON HOLD | DISCHARGE
Start: 2020-02-10 | End: 2020-04-17

## 2020-02-10 RX ORDER — HALOPERIDOL DECANOATE 100 MG/ML
200 INJECTION INTRAMUSCULAR
Status: DISCONTINUED | OUTPATIENT
Start: 2020-02-10 | End: 2020-02-11 | Stop reason: HOSPADM

## 2020-02-10 RX ADMIN — BENZTROPINE MESYLATE 0.5 MG: 0.5 TABLET ORAL at 18:51

## 2020-02-10 RX ADMIN — NICOTINE POLACRILEX 4 MG: 2 GUM, CHEWING ORAL at 18:04

## 2020-02-10 RX ADMIN — LORAZEPAM 2 MG: 1 TABLET ORAL at 20:02

## 2020-02-10 RX ADMIN — HALOPERIDOL 10 MG: 10 TABLET ORAL at 18:51

## 2020-02-10 RX ADMIN — HALOPERIDOL DECANOATE 200 MG: 100 INJECTION INTRAMUSCULAR at 13:08

## 2020-02-10 RX ADMIN — HALOPERIDOL 10 MG: 10 TABLET ORAL at 08:38

## 2020-02-10 RX ADMIN — BENZTROPINE MESYLATE 0.5 MG: 0.5 TABLET ORAL at 08:38

## 2020-02-10 RX ADMIN — DIPHENHYDRAMINE HYDROCHLORIDE 50 MG: 50 CAPSULE ORAL at 20:02

## 2020-02-10 ASSESSMENT — ACTIVITIES OF DAILY LIVING (ADL)
HYGIENE/GROOMING: INDEPENDENT
DRESS: SCRUBS (BEHAVIORAL HEALTH)
ORAL_HYGIENE: INDEPENDENT
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
## 2020-02-10 NOTE — PROGRESS NOTES
Lenar spoke with [facility], group [place] in [place], [place]. They reported that they would like to admit the patient but they are still waiting on talking to patient's CADI waiver worker and her ACT Team contact Carolyn. Patient stated that she talked to the group home after writer did and provided them with her CADI waiver workers phone number. Patient reports this is her plan but that in the mean time she would like to be discharged as soon as possible to a shelter. Lenar contacted [facility] ([phone]) and they said to call tomorrow and they could find patient a bed.

## 2020-02-10 NOTE — PROGRESS NOTES
Patient has been removed from her 2:1 status. She is able to contract for safety. She has been able to maintain safe behaviors today. She denies MH symptoms. She did receive her IM Haldol Dec today without hesitation. She is hopeful for discharge tomorrow.

## 2020-02-10 NOTE — PROGRESS NOTES
Writer spoke with Nupur Erwin, supervisor for patient's ACT team. She expressed concern that patient did not have a stable place to discharge to. Writer shared information about the group home in [place] and encouraged ACT team to follow up. Writer reported she would let the ACT team know the discharge plan for tomorrow, specifically what shelter patient would discharge to. Writer also let her know patient received in Haldol injection today.

## 2020-02-10 NOTE — PROGRESS NOTES
[facility], [place]   Psychiatric Progress Note  Hospital Day: 4        Interim History:   The patient's care was discussed with the treatment team during the daily team meeting and/or staff's chart notes were reviewed.  Staff report patient continued to have some head-banging and seizure-like episodes on Friday. Saturday and Sunday were uneventful.    Upon interview, the patient was playing cards with her 2:1 staff. She denies suicidal thoughts now. She does say that she was not doing well prior to admission, but now feels better. She tells me that she has a daughter, mother, and other family. She does not want to die and cites that it is against her [employer]. She agrees to take medications and follow-up. She wants to work on long term housing options with her ACT team and CADI worker. She is not agreeable to IRTS.    Psychiatric Symptoms: some paranoia still present, reactive and somewhat labile mood    Medication side effects reported: denies    Other issues reported by patient: none         Medications:       benztropine  0.5 mg Oral BID     haloperidol  10 mg Oral BID    Or     haloperidol lactate  10 mg Intramuscular BID     haloperidol decanoate  200 mg Intramuscular Q30 Days          Allergies:   No Known Allergies       Labs:   No results found for this or any previous visit (from the past 48 hour(s)).       Psychiatric Examination:     /88   Pulse 114   Temp 98.2  F (36.8  C)   Resp 18   SpO2 98%   Weight is 0 lbs 0 oz  There is no height or weight on file to calculate BMI.    Orthostatic Vitals     None            Appearance: awake, alert, adequately groomed and dressed in hospital scrubs  Attitude:  cooperative  Eye Contact:  intense  Mood:  labile  Affect:  intensity is dramatic and reactive  Speech:  clear, coherent and normal prosody  Language: fluent and intact in English  Psychomotor, Gait, Musculoskeletal:  no evidence of tardive dyskinesia, dystonia, or tics  and intact station, gait and muscle tone  Throught Process:  goal oriented  Associations:  no loose associations  Thought Content:  no evidence of suicidal ideation or homicidal ideation and appears paranoid about being in the hospital  Insight:  limited  Judgement:  fair  Oriented to:  time, person, and place  Attention Span and Concentration:  intact  Recent and Remote Memory:  intact  Fund of Knowledge:  appropriate    Clinical Global Impressions  First:  Considering your total clinical experience with this particular patient population, how severe are the patient's symptoms at this time?: 7 (02/07/20 0851)  Compared to the patient's condition at the START of treatment, this patient's condition is:: 4 (02/07/20 0851)  Most recent:  Considering your total clinical experience with this particular patient population, how severe are the patient's symptoms at this time?: 7 (02/07/20 0851)  Compared to the patient's condition at the START of treatment, this patient's condition is:: 4 (02/07/20 0851)           Precautions:     Behavioral Orders   Procedures     Assault precautions     Code 1 - Restrict to Unit     Elopement precautions     Routine Programming     As clinically indicated     Seizure precautions     Self Injury Precaution     Status 15     Every 15 minutes.     Suicide precautions     Patients on Suicide Precautions should have a Combination Diet ordered that includes a Diet selection(s) AND a Behavioral Tray selection for Safe Tray - with utensils, or Safe Tray - NO utensils            Diagnoses:      Schizoaffective disorder, bipolar type (H)  Suicidal ideation  Noncompliance with medication regimen  Self-injurious behavior  Hematoma of frontal scalp, initial encounter  Psychiatric pseudoseizure  Cannabis use disorder, moderate, dependence (H)  Cigarette nicotine dependence with withdrawal  Cocaine use disorder (H)         Assessment & Plan:   Assessment and hospital summary:  30-year-old woman admitted  due to paranoia and suicidal thoughts. In the ER and on the floor she was reporting suicidal thoughts and engaging in head-banging hard enough to lead to a scalp hematoma and multiple CT orders. The patient was also having seizure like episodes that were likely non-epileptic as they lacked any sort of typical post-ictal symptoms. The patient has done better over the last 2 days, accepting medications and not engaging in self-harm, but she is still paranoid appearing, labile, and requesting discharge.    Target psychiatric symptoms and interventions:  Psychosis  -continue with oral haloperidal at current dose.  -order injection of haloperidol decanoate. This was to be due on the 13th, but she likely will leave before then.    Agitation and anxiety  -lorazepam available PRN    Substance use  -patient is not agreeable to CD treatment referral despite recommendation and discussion on importance of sobriety    Nicotine dep:  -continue with NRT    Medical Problems and Treatments:  Scalp hematoma  -secondary to head-banging  -Had shown improvement between two CT scans  -conservative cares recommended by ER and internal medicine    Seizure-like episodes  -likely non-epileptic in nature  -patient not agreeable to referral for EEG at this time    Behavioral/Psychological/Social:  Encourage unit programming    Will discontinue 2:1 at this time due to improvement over last 2 days (no SIB) and patient denial of suicidal thoughts now.    Disposition discussion:  Patient is not agreeable to placement in IRTS. She has been working on group home with CADI worker and ACT team. Outside the hospital she largely does what she wants and leaves placements when she feels the need to. I do not believe that extending her time in the hospital unnecessarily will improve outcomes. As such, I plan to watch her overnight without her 2:1. If she continues to do well and is free from self-harm or voiced suicidal thinking will discharge tomorrow to  shelter. She will work with CADI worker and ACT team on her own.      Disposition Plan   Reason for ongoing admission: poses an imminent risk to self and is unable to care for self due to severe psychosis or jacqueline  Discharge location: shelter  Discharge Medications: ordered.  Follow-up Appointments: scheduled  Legal Status: full commitment with [facility] for haloperidol, risperidone, clozapine, chlorpromazine.  Entered by: Cy Sumner on 2/10/2020 at 2:02 PM

## 2020-02-11 VITALS
RESPIRATION RATE: 16 BRPM | SYSTOLIC BLOOD PRESSURE: 112 MMHG | HEART RATE: 105 BPM | DIASTOLIC BLOOD PRESSURE: 78 MMHG | TEMPERATURE: 98 F | OXYGEN SATURATION: 100 %

## 2020-02-11 PROCEDURE — 99239 HOSP IP/OBS DSCHRG MGMT >30: CPT | Performed by: PSYCHIATRY & NEUROLOGY

## 2020-02-11 PROCEDURE — 25000132 ZZH RX MED GY IP 250 OP 250 PS 637: Performed by: PSYCHIATRY & NEUROLOGY

## 2020-02-11 PROCEDURE — 25000132 ZZH RX MED GY IP 250 OP 250 PS 637: Performed by: NURSE PRACTITIONER

## 2020-02-11 RX ADMIN — BENZTROPINE MESYLATE 0.5 MG: 0.5 TABLET ORAL at 08:58

## 2020-02-11 RX ADMIN — NICOTINE POLACRILEX 4 MG: 2 GUM, CHEWING ORAL at 10:10

## 2020-02-11 RX ADMIN — HALOPERIDOL 10 MG: 10 TABLET ORAL at 08:58

## 2020-02-11 ASSESSMENT — ACTIVITIES OF DAILY LIVING (ADL)
ORAL_HYGIENE: INDEPENDENT
HYGIENE/GROOMING: INDEPENDENT
DRESS: SCRUBS (BEHAVIORAL HEALTH)
LAUNDRY: UNABLE TO COMPLETE

## 2020-02-11 NOTE — DISCHARGE INSTRUCTIONS
Behavioral Discharge Planning and Instructions      Summary:  You were admitted on 2/5/2020 due to suicidal ideation.  You were treated by Cy Sumner MD and discharged on 2/10/2020 from [facility] to a Shelter.  You will be going to do an intake at [facility] and then they will place you at the [facility]. We updated your ACT team that you will be discharging to a shelter.    [facility].  You are being discharged on a Provisional Discharge Agreement which shall remain in effect for the duration of the Commitment.  Your Commitment expires on 10/16/2020.  You were also court ordered to take the medications the doctor ordered for you.       Principal Diagnoses:   1.  Schizoaffective disorder, bipolar type.   2.  History of stimulant use disorder, cocaine type.   3.  History of cannabis use disorder.   4.  Self-injurious behavior of head banging, resulting in a scalp hematoma.   5.  Noncompliance of medications.     Health Care Follow-up Appointments:   - [facility] ACT Team:  Your ACT team will follow-up with your for your medication mgmt.   - Roxann Coyne - Phone: ([phone])  Nurse Practioner- Kofi Cifuentes - Phone: ([phone])  Nurse- Carolyn - Phone: ([phone])    - [facility] Direct Line:  Phone: [phone]  We will cab you to [facility] for your intake appointment.  Then they will set you up with a bed at [address].   Location: [facility]: [address] [place] [address]  Google Map Directions  HOURS:  Monday - Friday: 10:00 a.m. - 5:00 p.m.     Weekends and Holidays: 1:00 - 5:00 pm.  After hours:  [phone]  Location: [facility]: [address] [place] [address]  Google Map Directions    Individuals seeking shelter in [facility] need to visit the [facility] for an assessment and  placement at one of the [facility]. The [facility] operates on behalf of the [facility] which is made up of the five agencies providing shelter to single individuals over age 18 in [facility].  The [facility] Adult [facility] includes the following agencies: [facility], [facility], [place], [facility], and [facility].  [facility] oversees the day-to-day operations on behalf of the [facility] Adult [facility].     Adult Shelter [facility] is funded by [facility] Human Services and Public Health Department.   Thank you to [facility] for providing the space.    The [facility] is located at: [address] [place] [address] and is open from 5:00 pm - 9:00 am daily. Reservations for shelter are required!    - Acute Psychiatric Services  - [facility]  [address] [address]  [phone]  24/7 Psychiatric Services    - Walk In [facility]:   Walk-In at [address] [address]  M, W, F: 1:00PM-3:00PM  M, T, W, Th:  6:30PM-8:30PM    - Walk-In at [facility]  [address] [place] [address]  M, W: 5:00PM - 7:00PM    - Walk-In at [facility]  [address] [place] [address]  Tues: 6:00PM - 8:00PM    Major Treatments, Procedures and Findings:  You were provided with: a psychiatric assessment, assessed for medical stability, medication evaluation and/or management, group therapy, milieu management and medical interventions    Symptoms to Report: feeling more aggressive, increased confusion, losing more sleep, mood getting worse or thoughts of suicide    Early warning signs can include: increased depression or anxiety sleep disturbances increased thoughts or behaviors of suicide or self-harm  increased unusual thinking, such as paranoia or hearing voices    Safety and Wellness:  Take all  "medicines as directed.  Make no changes unless your doctor suggests them.      Follow treatment recommendations.  Refrain from alcohol and non-prescribed drugs.  If there is a concern for safety, call 911.    Resources:   Crisis Intervention: 936.919.7208 or 711-833-4828 (TTY: 186.179.9720).  Call anytime for help.  National [place] on Mental Illness (www.[place].j[place].org): 542.451.5574 or 480-765-8462.  Alcoholics Anonymous (www.alcoholics-anonymous.org): Check your phone book for your local chapter.  National Suicide Prevention Line (www.mentalhealthmn.org): 112-132-UHEI (1715)  [facility] Crisis (COPE) Response - Adult 171 502-8919  [facility] Crisis Response - Adult [phone]  Text 4 Life: txt \"LIFE\" to 50106 for immediate support and crisis intervention    The treatment team has appreciated the opportunity to work with you.  If you have any questions or concerns our unit number is [phone]            "

## 2020-02-11 NOTE — DISCHARGE SUMMARY
Psychiatric Discharge Summary    Ilene Liu MRN# [id number]   Age: 30 year old YOB: 1990     Date of Admission:  2/5/2020  Date of Discharge:  2/11/2020  Admitting Physician:  Cy Sumner MD  Discharge Physician:  Cy Sumner MD         Event Leading to Hospitalization:   A 30-year-old woman admitted with psychosis and suicidal thoughts.        See Admission note by Cy Sumner MD on 2/7/2020 for additional details.          Diagnoses:     Schizoaffective disorder, bipolar type (H)  Suicidal ideation  Noncompliance with medication regimen  Self-injurious behavior  Hematoma of frontal scalp, initial encounter  Psychiatric pseudoseizure  Cannabis use disorder, moderate, dependence (H)  Cigarette nicotine dependence with withdrawal  Cocaine use disorder (H)         Labs:     Results for orders placed or performed during the hospital encounter of 02/05/20   Head CT w/o contrast     Status: None    Narrative    CT SCAN OF THE HEAD WITHOUT CONTRAST   2/6/2020 6:17 PM     HISTORY: Frontal head injury.    TECHNIQUE:  Axial images of the head and coronal reformations without  IV contrast material. Radiation dose for this scan was reduced using  automated exposure control, adjustment of the mA and/or kV according  to patient size, or iterative reconstruction technique.    COMPARISON: 7/12/2019.    FINDINGS:  The ventricles are normal in size, shape and configuration.   The brain parenchyma and subarachnoid spaces are normal. There is no  evidence of intracranial hemorrhage, mass, acute infarct or anomaly.     The visualized portions of the sinuses and mastoids appear normal.  There is anterior scalp soft tissue swelling and small hematoma in the  midline of the inferior frontal bone. There is no underlying fracture.      Impression    IMPRESSION:  1. Normal CT scan of the brain.  2. No evidence of fracture.  3. Anterior inferior frontal scalp hematoma.    SOFIA ROBERTSON MD   CT  Head w/o Contrast     Status: None    Narrative    CT HEAD W/O CONTRAST 2/7/2020 2:43 PM    Provided History: repeat head trauma, new today  ICD-10:    Comparison: 2/6/2020  17:51    Technique: Using multidetector thin collimation helical acquisition  technique, axial, coronal and sagittal CT images from the skull base  to the vertex were obtained without intravenous contrast.     Findings:     No intracranial hemorrhage or extra-axial collection. Gray-white  matter differentiation is preserved. No hydrocephalus. No evidence of  fracture. Minimal scalp swelling over the frontal bone is slightly  smaller than prior. Aerated paranasal sinuses are clear.      Impression    Impression: No intracranial hemorrhage. Minimal frontal scalp swelling  is slightly smaller than prior..    DERREK BANGURA MD   CT Cervical Spine w/o Contrast     Status: None    Narrative    CT CERVICAL SPINE W/O CONTRAST 2/7/2020 2:44 PM    Provided History: head banging trauma (repeat today on 2/7) rule out  acute fracture    Comparison:    Technique: Using multidetector thin collimation helical acquisition  technique, axial, coronal and sagittal CT images through the cervical  spine were obtained without intravenous contrast.     Findings:    Images of the lower cervical spine are partially degraded by artifact  related to beam hardening, related to patient's body habitus. The  cervical vertebrae are normally aligned. No evidence for acute  fracture or subluxation. Normal cervical lordosis. No prevertebral  edema. There is no disc height narrowing at any level. There is no  spinal canal or neural foraminal stenosis at any level. There are  multiple scattered enlarged lymph nodes within the neck involving  bilateral level 1B, level 2A, which measure up to 2 cm in short axis.  Findings are similar since at least 7/11/2019. Findings may be  reactive.     No abnormality of the paraspinous soft tissues.      Impression    Impression:   No acute fracture  or traumatic subluxation.       RAFFI ZHAO MD   Glucose by meter     Status: Abnormal   Result Value Ref Range    Glucose 114 (H) 70 - 99 mg/dL   Neurology General Adult IP Consult: Patient to be seen: Routine within 24 hrs; Call back #: [phone]; seizure like activity, cannot rule out seizure; Consultant may enter orders: Yes; Requesting provider? Hospitalist (if different from attendi...     Status: None ()    Narrative    Kin Condon MD     2/8/2020  9:45 AM  [facility]  Neurology Consult  2/8/2020      Ilene Liu MRN# [id number]   YOB: 1990 Age: 30 year old      Date of Admission:  2/5/2020    Primary care provider: No Ref-Primary, Physician    Requesting physician:  Elizabet Briceno PA-C    Reason for Consult: Seizure like activity         Assessment and Recommendations:   Assessment: Ilene Liu is a 30 year old female currently   admitted to inpatient psychiatry with a history of substance   abuse, schizoaffective disorder, and self-injurious behavior who   neurology was consulted for seizure-like activity.   I suspect   based on the description of the lack of postictal state and   extraocular muscle control, as well as different spells occurring   each time (not stereotyped) but this is less likely to be   epileptic seizures.  It is unclear if her twitching episode after   head-banging could have been an epileptic seizure, however if it   was I suspect it was provoked by trauma and may not require   long-term antiepileptics in any case.   I discussed case with   hospitalist PA who is with inpatient psychiatry team. Ideally, I   do think it would be reasonable to bring over to [place] to   consider long-term video EEG monitoring, however psychiatry   rightly has concerns that patient will be able to tolerate or   allow EEG monitoring.   Her more pressing concern at present is   her decompensated psychiatric status and they  recommended she   stay over on inpatient psychiatry at this time which I think is   reasonable given lower suspicion for epileptic events.  Ideally   we can complete seizure work-up including MRI and EEG at some   point although I agree I do not think she would tolerate right   now.  This hopefully could be completed in the future.  Patient's   exam is quite limited due to participation but appears from what   I can tell is nonfocal and generally reassuring.  Would not start   any antiepileptics at this time, unless considering using for   psychiatric reasons as well in which case lamotrigine or   valproate may be considered.  Would avoid benzodiazepines if   possible unless spells are more clearly generalized tonic clonic   in nature, or prolonged episode.  Also definitely would consider   more aggressive treatment if patient is becoming vitally   unstable.  Neurology will sign off at this time, if spells   continue and think patient can tolerate video EEG monitoring   please reconsult.  If spells continue to be recurrent and unable   to characterize and is refractory to treatment, would consider   NMDA testing in the future but do not feel indicated right now   with her more prolonged psychiatric history.      Recommendations:  - MRI brain and routine EEG when patient stable to allow  -Would avoid benzodiazepines for the spells and less more clearly   epileptic in nature or patient becoming vitally unstable.  -Would not load with antiepileptic at this time, as event most   concerning for epilepsy was likely provoked by head trauma.    Kin Condon DO   of Neurology              Chief Complaint:   Seizures       History is obtained from the patient and/or family and medical   record      Ilene Liu is a 30 year old female who is currently   under commitment who was admitted due to concern for psychosis.    She was noted to be acting paranoid reporting suicidal thoughts.    She was  also engaging in self-injurious behavior including head   banging, after which she was noted to have some generalized   shaking activity.   Notes indicate the emergency room doctor   thought this may be nonepileptic due to the fact that she was   able to control her eyes during the event.  Yesterday, patient   had a CODE BLUE called for seizure activity.  Again she had   intentionally hit her head and was noted to exhibit approximately   1 minute of subtle twitching of her extremities.  I spoke with   medicine PA who denied large tonic-clonic or rigid rigid   component.  This lasted approximately minute with no real   postictal state.  Shortly after this she had a second episode of   which she forcibly closed her eyes and did not respond to voice   or pain not associated with any twitching or tonic movements.     She told them that her seizures are brought on by anxiety or   emotions and that placing her restraints would bring out a   seizure.    Patient's behavior is been quite difficult to control this   admission and she has been demanding to leave and resisting   cares.   For me this morning she reports she thinks she had a   seizure yesterday and one 3 months ago.  She denies seizures   prior, or family history of seizures.  She does have frequent   head trauma noted above but no history of intracranial processes   including meningitis.  Patient denied pain, and stated she just   wanted to sleep and refused to answer remainder of history   questions.            Past Medical History:     Past Medical History:   Diagnosis Date     Asthma      Cocaine abuse      Depressive disorder      Marijuana use      Schizo-affective type schizophrenia               Past Surgical History:     Past Surgical History:   Procedure Laterality Date     ESOPHAGOSCOPY, GASTROSCOPY, DUODENOSCOPY (EGD), COMBINED N/A   12/17/2018    Procedure: COMBINED ESOPHAGOSCOPY, GASTROSCOPY, DUODENOSCOPY   (EGD);  Surgeon: Cassie Rahman MD;   "Location: [facility]             Social History:     Social History     Socioeconomic History     Marital status: Single     Spouse name: Not on file     Number of children: Not on file     Years of education: Not on file     Highest education level: Not on file   Occupational History     Not on file   Social Needs     Financial resource strain: Not on file     Food insecurity:     Worry: Not on file     Inability: Not on file     Transportation needs:     Medical: Not on file     Non-medical: Not on file   Tobacco Use     Smoking status: Current Some Day Smoker     Packs/day: 0.25     Smokeless tobacco: Never Used   Substance and Sexual Activity     Alcohol use: Yes     Comment: not currently drinking anything     Drug use: Never     Types: Marijuana, \"Crack\" cocaine     Sexual activity: Not Currently   Lifestyle     Physical activity:     Days per week: Not on file     Minutes per session: Not on file     Stress: Not on file   Relationships     Social connections:     Talks on phone: Not on file     Gets together: Not on file     Attends [employer]ical service: Not on file     Active member of club or organization: Not on file     Attends meetings of clubs or organizations: Not on file     Relationship status: Not on file     Intimate partner violence:     Fear of current or ex partner: Not on file     Emotionally abused: Not on file     Physically abused: Not on file     Forced sexual activity: Not on file   Other Topics Concern     Not on file   Social History Narrative     Not on file             Family History:   History reviewed. No pertinent family history.          Immunizations:     There is no immunization history on file for this patient.         Allergies:    No Known Allergies          Medications:     Prescription Medications as of 2/8/2020       Rx Number Disp Refills Start End Last Dispensed Date Next Fill   Date Owning Pharmacy    albuterol (PROAIR HFA/PROVENTIL HFA/VENTOLIN HFA) 108 (90 Base)   MCG/ACT " "inhaler  1 Inhaler 0 6/23/2019        Sig: Inhale 2 puffs into the lungs every 6 hours as needed for   shortness of breath / dyspnea or wheezing    Class: Local Print    Route: Inhalation    benztropine (COGENTIN) 0.5 MG tablet            Sig: Take 0.5 mg by mouth 2 times daily    Class: Historical    Route: Oral    haloperidol (HALDOL) 5 MG tablet  60 tablet 0 7/16/2019      [facility] [address]    Sig: Take 1 tablet (5 mg) by mouth 2 times daily    Class: E-Prescribe    Route: Oral    haloperidol decanoate (HALDOL DECANOATE) 100 MG/ML injection      7/16/2019        Sig: Inject 200 mg into the muscle every 28 days DUE 7/12/19    Class: No Print Out    Route: Intramuscular    polyethylene glycol (MIRALAX/GLYCOLAX) packet            Sig: Take 17 g by mouth daily as needed for constipation    Class: Historical    Route: Oral      Hospital Medications as of 2/8/2020       Dose Frequency Start End    acetaminophen (TYLENOL) tablet 650 mg 650 mg EVERY 4 HOURS PRN   2/6/2020     Admin Instructions: Do not use if the patient has significant   liver disease. MAX acetaminophen = 4000 mg/24 hrs.  MAX   acetaminophen <3000 mg/24 hrs for patients > or = 65 years   old.<BR>Maximum acetaminophen dose from all sources = 75   mg/kg/day not to exceed 4 grams/day.    Class: E-Prescribe    Route: Oral    albuterol (PROAIR HFA/PROVENTIL HFA/VENTOLIN HFA) 108 (90 Base)   MCG/ACT inhaler 2 puff 2 puff EVERY 6 HOURS PRN 2/6/2020     Class: E-Prescribe    Route: Inhalation    benztropine (COGENTIN) tablet 0.5 mg 0.5 mg 2 TIMES DAILY   2/6/2020     Class: E-Prescribe    Route: Oral    diphenhydrAMINE (BENADRYL) capsule 25-50 mg 25-50 mg EVERY 4   HOURS PRN 2/6/2020     Class: E-Prescribe    Route: Oral    Linked Group 1:  \"Or\" Linked Group Details        diphenhydrAMINE (BENADRYL) injection 25-50 mg 25-50 mg EVERY 4   HOURS PRN 2/6/2020     Admin Instructions: For ordered IV doses 1-50 mg, give IV " "Push   undiluted. Give each 25mg over a minimum of 1 minute. Extend in   non-emergency    Class: E-Prescribe    Route: Intramuscular    Linked Group 1:  \"Or\" Linked Group Details        haloperidol (HALDOL) tablet 10 mg 10 mg 2 TIMES DAILY 2/7/2020     Class: E-Prescribe    Route: Oral    Linked Group 2:  \"Or\" Linked Group Details        haloperidol (HALDOL) tablet 5 mg 5 mg EVERY 4 HOURS PRN 2/6/2020       Class: E-Prescribe    Route: Oral    Linked Group 3:  \"Or\" Linked Group Details        haloperidol lactate (HALDOL) injection 10 mg 10 mg 2 TIMES DAILY   2/7/2020     Admin Instructions: This is a Aranda medication. Give IM if   patient refuses oral dose.<BR>For ordered doses up to 5 mg, drug   can be give IV Push undiluted over 1 minute.    Class: E-Prescribe    Route: Intramuscular    Linked Group 2:  \"Or\" Linked Group Details        haloperidol lactate (HALDOL) injection 5 mg 5 mg EVERY 4 HOURS   PRN 2/6/2020     Admin Instructions: For ordered doses up to 5 mg, drug can be   give IV Push undiluted over 1 minute.    Class: E-Prescribe    Route: Intramuscular    Linked Group 3:  \"Or\" Linked Group Details        hydrOXYzine (ATARAX) tablet 25 mg 25 mg EVERY 4 HOURS PRN   2/6/2020     Admin Instructions: Administer only if there is no other oral   medication ordered prn for anxiety,    Class: E-Prescribe    Route: Oral    LORazepam (ATIVAN) injection 1-2 mg 1-2 mg EVERY 4 HOURS PRN   2/6/2020     Admin Instructions: This drug may cause significant respiratory   depression.  Monitor respiratory status and vital signs carefully   for 1 hour after each dose.    Class: E-Prescribe    Route: Intramuscular    Linked Group 4:  \"Or\" Linked Group Details        LORazepam (ATIVAN) tablet 1-2 mg 1-2 mg EVERY 4 HOURS PRN   2/6/2020     Class: E-Prescribe    Route: Oral    Linked Group 4:  \"Or\" Linked Group Details        nicotine (NICORETTE) gum 2-4 mg 2-4 mg EVERY 1 HOUR PRN 2/6/2020       Admin Instructions: Chew until " "tingling, then place between   cheek and gum.  <BR>Repeat.  <BR>Do not swallow.  <BR>Not to   exceed 48 mg in a 24 hour time period.    Class: E-Prescribe    Route: Buccal    OLANZapine (zyPREXA) injection 10 mg 10 mg 3 TIMES DAILY PRN   2/7/2020     Admin Instructions: DO NOT GIVE WITHIN 1 HOUR OF IM   ATIVAN<BR>Dissolve the contents of the 10 mg vial using 2.1 mL of   Sterile Water for Injection to provide a solution containing 5   mg/mL of olanzapine. Withdraw the ordered dose from vial. Use   immediately (within 1 hour) after reconstitution.  Discard any   unused portion.    Class: E-Prescribe    Route: Intramuscular    Linked Group 5:  \"Or\" Linked Group Details        OLANZapine zydis (zyPREXA) ODT tab 10 mg 10 mg 3 TIMES DAILY PRN   2/7/2020     Admin Instructions: Combined IM and PO doses may significantly   increase the risk of orthostatic hypotension at 30 mg per day or   higher.<BR>With dry hands, peel back foil backing and gently   remove tablet. Do not push oral disintegrating tablet through   foil backing. Administer immediately on tongue and oral   disintegrating tablet dissolves in seconds, then swallow with   saliva. Liquid not required.    Class: E-Prescribe    Route: Oral    Linked Group 5:  \"Or\" Linked Group Details        polyethylene glycol (MIRALAX/GLYCOLAX) Packet 17 g 17 g DAILY   PRN 2/6/2020     Admin Instructions: 1 Packet = 17 grams. Mix each gram with at   least 1/2 ounce (15 mL) of water - <BR>8 ounces  for 17 g dose, 4   ounces for 8.5 g dose, 2 ounces for 4 g dose.<BR>Follow with the   same volume of water.<BR>Hold for loose stools.<BR>    Class: E-Prescribe    Route: Oral                Review of Systems:   Review of systems not obtained due to patient factors - lack of   cooperation         Physical Exam:   /88   Pulse 114   Temp 98.2  F (36.8  C)   Resp 18     SpO2 98%    Physical Exam:   General: NAD  HEENT: sclera anicteric  Resp: Breathing comfortably, no respiratory " distress  CVS: mildly tachycardic  Skin: warm and dry  Extremities:Atraumatic  Neurologic:  Neurologic exam is quite limited due to patient participation.    She is awake, alert, oriented to person place and month and year.    She is not oriented today.  Her speech is fluent without   dysarthria.  She is able to name appropriately.  Extraocular   muscles appear intact but she does not follow for full testing.    Face is grossly symmetric.   strength is equal bilaterally   and she is moving all 4 extremities equal and antigravity.  She   does move to pain in all 4 extremities.  Upper extremity deep   tendon reflexes intact, but patient became frustrated and would   not allow further reflex testing.  Unable to test coordination   and gait due to patient participation.          Data:   CBC:  Lab Results   Component Value Date    WBC 4.2 11/16/2019     Lab Results   Component Value Date    HGB 14.9 11/16/2019     Lab Results   Component Value Date    HCT 43.0 11/16/2019     Lab Results   Component Value Date     11/16/2019       Last Basic Metabolic Panel:  Lab Results   Component Value Date     11/16/2019      Lab Results   Component Value Date    POTASSIUM 3.8 11/16/2019     Lab Results   Component Value Date    CHLORIDE 104 11/16/2019     Lab Results   Component Value Date    CONG 9.2 11/16/2019     Lab Results   Component Value Date    CO2 21 11/16/2019     Lab Results   Component Value Date    BUN 11 11/16/2019     Lab Results   Component Value Date    CR 0.48 11/16/2019     Lab Results   Component Value Date    GLC 99 11/16/2019       Head CT: Personally reviewed, small frontal scalp hematoma.  No   other acute intracranial process seen.          This note was transcribed with Dragon Dictation software.  Please   excuse any typos.                        Consults:   Patient was seen by internal medicine due to recurrent head banging and seizure-like episodes.         Hospital Course:   Ilene Morgan  Aided was admitted to [facility] with attending Cy Sumner MD under an ongoing civil commitment. The patient was placed under status 15 (15 minute checks) to ensure patient safety.     The patient initially was engaging in head banging ot the point that she needed 2:1 staff to keep her safe for the first few days in the hospital. She was reporting suicidal thoughts at that time too. The patient was compliant with medications and improved. She was able to be taken off of her extra staffing on 2/10 and remained safe overnight. Given her history it was decided to discharge her on 2/11 to a shelter. She will work with her CADI worker and ACT team on long term solution. Patient was given her 200 mg haloperidol injection a few days early to ensure she received it. In addition her oral haloperidol was increased on admission for stability.    Disposition discussion:  Patient is not agreeable to placement in [facility]. She has been working on group home with CADI worker and ACT team. Outside the hospital she largely does what she wants and leaves placements when she feels the need to. I do not believe that extending her time in the hospital unnecessarily will improve outcomes.She was able to remain safe off of her extra staffing for over 24 hours. Affect and cooperation improved. She was felt to be safe to discharge.    Aranda medications at the time of discharge: haloperidol, risperidone, clozapine, chlorpromazine           Discharge Medications:     Current Discharge Medication List      START taking these medications    Details   LORazepam (ATIVAN) 1 MG tablet Take 1 tablet (1 mg) by mouth 2 times daily as needed for agitation or anxiety  Qty: 10 tablet, Refills: 0    Associated Diagnoses: Schizoaffective disorder, bipolar type (H)         CONTINUE these medications which have CHANGED    Details   benztropine (COGENTIN) 0.5 MG tablet Take 1 tablet (0.5 mg) by mouth 2 times daily  Qty: 60 tablet, Refills: 0     Associated Diagnoses: Schizoaffective disorder, bipolar type (H)      haloperidol (HALDOL) 10 MG tablet Take 1 tablet (10 mg) by mouth 2 times daily  Qty: 60 tablet, Refills: 0    Associated Diagnoses: Schizoaffective disorder, bipolar type (H)      haloperidol decanoate (HALDOL DECANOATE) 100 MG/ML injection Inject 200 mg into the muscle every 28 days DUE 3/9/2020    Associated Diagnoses: Schizoaffective disorder, bipolar type (H)         CONTINUE these medications which have NOT CHANGED    Details   albuterol (PROAIR HFA/PROVENTIL HFA/VENTOLIN HFA) 108 (90 Base) MCG/ACT inhaler Inhale 2 puffs into the lungs every 6 hours as needed for shortness of breath / dyspnea or wheezing  Qty: 1 Inhaler, Refills: 0      polyethylene glycol (MIRALAX/GLYCOLAX) packet Take 17 g by mouth daily as needed for constipation                  Psychiatric Examination:   Appearance:  awake, alert, adequately groomed and dressed in hospital scrubs  Attitude:  cooperative  Eye Contact:  good  Mood:  good  Affect:  appropriate and in normal range and mood congruent  Speech:  clear, coherent and normal prosody  Psychomotor Behavior:  no evidence of tardive dyskinesia, dystonia, or tics  Thought Process:  linear and goal oriented  Associations:  no loose associations  Thought Content:  no evidence of suicidal ideation or homicidal ideation and no signs of hallucinations or paranoia at this time  Insight:  fair  Judgment:  intact  Oriented to:  time, person, and place  Attention Span and Concentration:  intact  Recent and Remote Memory:  intact  Language: Able to name objects, Able to repeat phrases and Able to read and write  Fund of Knowledge: appropriate  Muscle Strength and Tone: normal  Gait and Station: Normal         Discharge Plan:   Health Care Follow-up Appointments:   - [facility] ACT Team:  Your ACT team will follow-up with your for your medication mgmt.   - Roxann Coyne - Phone: ([phone])  Nurse Practioner- Kofi  Esau - Phone: ([phone])  Nurse- Carolyn - Phone: ([phone])     - [facility] Direct Line:  Phone: [phone]  We will cab [facility] to [facility] for your intake appointment.  Then they will set you up with a bed at [address].     Attestation:  The patient has been seen and evaluated by me,  Cy Sumner MD  On the day of discharge, I saw the patient and performed the above examination, reviewed discharge medications, reviewed follow-up plan, and assessed safety for discharge. I spent greater than 30 minutes on these tasks.

## 2020-02-11 NOTE — PROGRESS NOTES
02/10/20 2207   Behavioral Health   Thinking distractable;poor concentration   Eye Contact at examiner   Affect blunted, flat   Mood anxious;mood is calm   Physical Appearance/Attire appears stated age   Suicidality   (EMILY)   Elopement   (No signs.)   Activity isolative;restless   Speech clear;coherent   Medication Sensitivity no observed side effects;no stated side effects   Psychomotor / Gait steady;balanced   Activities of Daily Living   Hygiene/Grooming independent   Oral Hygiene independent   Dress scrubs (behavioral health)   Room Organization independent   Activity   Activity Assistance Provided independent     Pt was in her room most of the evening, she came out get her tray and returned to her room. Pt could be see   pacing the miller before dinner arrived.

## 2020-02-11 NOTE — PROGRESS NOTES
Left  for ACT team SUMIT Coyne at [phone].  Updated her on pt [facility].    Asked for her to return my call so I can get her fax number to send the provisional discharge to them for review.

## 2020-02-11 NOTE — PROGRESS NOTES
Patient has continued to exhibit calm and cooperative behaviors since the discontinuation of SIO.  She stated she will continue to show safe behaviors until discharge.   She denied SI/SIB/HI.

## 2020-02-11 NOTE — PROGRESS NOTES
Patient discharging 2/11/2020 via taxi to [facility] at [address] at approximately 1500.     AVS Discharge paperwork and medications reviewed with patient who reports understanding.     AVS copy provided to patient. Medications from pharmacy gone through thoroughly with patient who reports understanding. Security envelope opened with patient and gone through and given to patient. Locker belongings gone through with patient and given to patient. Patient denies any thoughts of suicidal ideations, denies any thoughts of self harm, denies any homicidal ideations. Patient reported feeling she could keep herself safe. Patient denies anxiety and depression. Copy of provisional discharge given to patient.     Patient completed Personal Plan of Care, identifying reasons for hospitalization and goals for discharge.     Survey provided.

## 2020-02-11 NOTE — PROGRESS NOTES
Pt will be discharging to [facility] who will assist her in getting admit done into their shelter.  She will be staying at their [facility] location/Shelter located at [address] [place] [address].  However, she needs to start at the [facility] ([address] ) in order to do intake paperwork.    She is also to follow-up with her [facility] ACT team - Roxann Coyne following discharge ([phone]).    Pt signed her provisional discharge agreement.  Copy was faxed to her ACT Team CM and also faxed copy of PD and change of status to mental health court.     - [facility] ACT Team:  Nurse- Carolyn ([phone])  - Roxann Coyne ([phone])  Fax: [phone]  Nurse Practioner- Harvinder Cifuentes ([phone])     - Adult [facility] Direct Line:  Phone: [phone]  We will cab you to [facility] for your intake appointment.  Then they will set you up with a bed at [address].   Location: [facility]: [address] [place] [address]  Google Map Directions  HOURS:  Monday - Friday: 10:00 a.m. - 5:00 p.m.     Weekends and Holidays: 1:00 - 5:00 pm.  After hours:  [phone]

## 2020-04-10 ENCOUNTER — HOSPITAL ENCOUNTER (INPATIENT)
Facility: CLINIC | Age: 30
LOS: 6 days | Discharge: NURSING, REHAB OR LONG-TERM CARE | End: 2020-04-17
Attending: PSYCHIATRY & NEUROLOGY | Admitting: PSYCHIATRY & NEUROLOGY
Payer: COMMERCIAL

## 2020-04-10 DIAGNOSIS — F19.11 HISTORY OF SUBSTANCE ABUSE (H): ICD-10-CM

## 2020-04-10 DIAGNOSIS — Z91.148 NONCOMPLIANCE WITH MEDICATION REGIMEN: ICD-10-CM

## 2020-04-10 DIAGNOSIS — J45.909 ASTHMA, UNSPECIFIED ASTHMA SEVERITY, UNSPECIFIED WHETHER COMPLICATED, UNSPECIFIED WHETHER PERSISTENT: ICD-10-CM

## 2020-04-10 DIAGNOSIS — F17.200 NICOTINE USE DISORDER: ICD-10-CM

## 2020-04-10 DIAGNOSIS — F17.213 CIGARETTE NICOTINE DEPENDENCE WITH WITHDRAWAL: Primary | ICD-10-CM

## 2020-04-10 DIAGNOSIS — F25.0 SCHIZOAFFECTIVE DISORDER, BIPOLAR TYPE (H): ICD-10-CM

## 2020-04-10 PROCEDURE — 90791 PSYCH DIAGNOSTIC EVALUATION: CPT

## 2020-04-10 PROCEDURE — 99285 EMERGENCY DEPT VISIT HI MDM: CPT | Mod: 25

## 2020-04-10 PROCEDURE — 25000132 ZZH RX MED GY IP 250 OP 250 PS 637: Performed by: PSYCHIATRY & NEUROLOGY

## 2020-04-10 PROCEDURE — 99285 EMERGENCY DEPT VISIT HI MDM: CPT | Mod: Z6 | Performed by: PSYCHIATRY & NEUROLOGY

## 2020-04-10 RX ORDER — OLANZAPINE 10 MG/1
10 TABLET, ORALLY DISINTEGRATING ORAL ONCE
Status: COMPLETED | OUTPATIENT
Start: 2020-04-10 | End: 2020-04-10

## 2020-04-10 RX ADMIN — OLANZAPINE 10 MG: 10 TABLET, ORALLY DISINTEGRATING ORAL at 21:00

## 2020-04-10 RX ADMIN — OLANZAPINE 10 MG: 10 TABLET, ORALLY DISINTEGRATING ORAL at 22:33

## 2020-04-10 ASSESSMENT — ENCOUNTER SYMPTOMS
DECREASED CONCENTRATION: 1
CARDIOVASCULAR NEGATIVE: 1
ACTIVITY CHANGE: 1
MUSCULOSKELETAL NEGATIVE: 1
NEUROLOGICAL NEGATIVE: 1
GASTROINTESTINAL NEGATIVE: 1
AGITATION: 1
SLEEP DISTURBANCE: 1
RESPIRATORY NEGATIVE: 1
NERVOUS/ANXIOUS: 1
EYES NEGATIVE: 1

## 2020-04-11 PROCEDURE — 25000128 H RX IP 250 OP 636

## 2020-04-11 PROCEDURE — 25000128 H RX IP 250 OP 636: Performed by: PSYCHIATRY & NEUROLOGY

## 2020-04-11 PROCEDURE — 12400001 ZZH R&B MH UMMC

## 2020-04-11 PROCEDURE — 99223 1ST HOSP IP/OBS HIGH 75: CPT | Mod: 95 | Performed by: PSYCHIATRY & NEUROLOGY

## 2020-04-11 RX ORDER — LORAZEPAM 1 MG/1
1-2 TABLET ORAL EVERY 6 HOURS PRN
Status: DISCONTINUED | OUTPATIENT
Start: 2020-04-11 | End: 2020-04-17 | Stop reason: HOSPADM

## 2020-04-11 RX ORDER — HALOPERIDOL DECANOATE 100 MG/ML
200 INJECTION INTRAMUSCULAR
Status: DISCONTINUED | OUTPATIENT
Start: 2020-04-11 | End: 2020-04-17 | Stop reason: HOSPADM

## 2020-04-11 RX ORDER — HALOPERIDOL 5 MG/ML
10 INJECTION INTRAMUSCULAR EVERY 6 HOURS PRN
Status: DISCONTINUED | OUTPATIENT
Start: 2020-04-11 | End: 2020-04-11

## 2020-04-11 RX ORDER — HYDROXYZINE HYDROCHLORIDE 25 MG/1
25 TABLET, FILM COATED ORAL EVERY 4 HOURS PRN
Status: DISCONTINUED | OUTPATIENT
Start: 2020-04-11 | End: 2020-04-15

## 2020-04-11 RX ORDER — HALOPERIDOL 10 MG/1
10 TABLET ORAL EVERY 6 HOURS PRN
Status: DISCONTINUED | OUTPATIENT
Start: 2020-04-11 | End: 2020-04-11 | Stop reason: CLARIF

## 2020-04-11 RX ORDER — HALOPERIDOL 10 MG/1
10 TABLET ORAL EVERY 6 HOURS PRN
Status: DISCONTINUED | OUTPATIENT
Start: 2020-04-11 | End: 2020-04-14

## 2020-04-11 RX ORDER — LORAZEPAM 1 MG/1
1-2 TABLET ORAL EVERY 6 HOURS PRN
Status: DISCONTINUED | OUTPATIENT
Start: 2020-04-11 | End: 2020-04-11 | Stop reason: CLARIF

## 2020-04-11 RX ORDER — TRAZODONE HYDROCHLORIDE 50 MG/1
50 TABLET, FILM COATED ORAL
Status: DISCONTINUED | OUTPATIENT
Start: 2020-04-11 | End: 2020-04-17 | Stop reason: HOSPADM

## 2020-04-11 RX ORDER — ALUMINA, MAGNESIA, AND SIMETHICONE 2400; 2400; 240 MG/30ML; MG/30ML; MG/30ML
30 SUSPENSION ORAL EVERY 4 HOURS PRN
Status: DISCONTINUED | OUTPATIENT
Start: 2020-04-11 | End: 2020-04-17 | Stop reason: HOSPADM

## 2020-04-11 RX ORDER — BISACODYL 10 MG
10 SUPPOSITORY, RECTAL RECTAL DAILY PRN
Status: DISCONTINUED | OUTPATIENT
Start: 2020-04-11 | End: 2020-04-17 | Stop reason: HOSPADM

## 2020-04-11 RX ORDER — DIPHENHYDRAMINE HYDROCHLORIDE 50 MG/ML
INJECTION INTRAMUSCULAR; INTRAVENOUS
Status: COMPLETED
Start: 2020-04-11 | End: 2020-04-11

## 2020-04-11 RX ORDER — DIPHENHYDRAMINE HYDROCHLORIDE 50 MG/ML
50 INJECTION INTRAMUSCULAR; INTRAVENOUS EVERY 6 HOURS PRN
Status: DISCONTINUED | OUTPATIENT
Start: 2020-04-11 | End: 2020-04-17 | Stop reason: HOSPADM

## 2020-04-11 RX ORDER — DIPHENHYDRAMINE HCL 50 MG
50 CAPSULE ORAL EVERY 6 HOURS PRN
Status: DISCONTINUED | OUTPATIENT
Start: 2020-04-11 | End: 2020-04-11 | Stop reason: CLARIF

## 2020-04-11 RX ORDER — ACETAMINOPHEN 325 MG/1
650 TABLET ORAL EVERY 4 HOURS PRN
Status: DISCONTINUED | OUTPATIENT
Start: 2020-04-11 | End: 2020-04-17 | Stop reason: HOSPADM

## 2020-04-11 RX ORDER — LORAZEPAM 2 MG/ML
INJECTION INTRAMUSCULAR
Status: COMPLETED
Start: 2020-04-11 | End: 2020-04-11

## 2020-04-11 RX ORDER — LORAZEPAM 1 MG/1
1 TABLET ORAL DAILY PRN
COMMUNITY

## 2020-04-11 RX ORDER — HALOPERIDOL 5 MG/ML
INJECTION INTRAMUSCULAR
Status: COMPLETED
Start: 2020-04-11 | End: 2020-04-11

## 2020-04-11 RX ORDER — LORAZEPAM 2 MG/ML
1-2 INJECTION INTRAMUSCULAR EVERY 6 HOURS PRN
Status: DISCONTINUED | OUTPATIENT
Start: 2020-04-11 | End: 2020-04-17 | Stop reason: HOSPADM

## 2020-04-11 RX ORDER — HALOPERIDOL 10 MG/1
10 TABLET ORAL 2 TIMES DAILY
Status: DISCONTINUED | OUTPATIENT
Start: 2020-04-11 | End: 2020-04-14

## 2020-04-11 RX ORDER — HALOPERIDOL 5 MG/ML
10 INJECTION INTRAMUSCULAR EVERY 6 HOURS PRN
Status: DISCONTINUED | OUTPATIENT
Start: 2020-04-11 | End: 2020-04-14

## 2020-04-11 RX ORDER — LORAZEPAM 1 MG/1
1 TABLET ORAL 2 TIMES DAILY PRN
Status: DISCONTINUED | OUTPATIENT
Start: 2020-04-11 | End: 2020-04-11

## 2020-04-11 RX ORDER — HALOPERIDOL 10 MG/1
10 TABLET ORAL EVERY 6 HOURS PRN
Status: DISCONTINUED | OUTPATIENT
Start: 2020-04-11 | End: 2020-04-11

## 2020-04-11 RX ORDER — DIPHENHYDRAMINE HCL 50 MG
50 CAPSULE ORAL EVERY 6 HOURS PRN
Status: DISCONTINUED | OUTPATIENT
Start: 2020-04-11 | End: 2020-04-17 | Stop reason: HOSPADM

## 2020-04-11 RX ORDER — BENZTROPINE MESYLATE 0.5 MG/1
0.5 TABLET ORAL 2 TIMES DAILY
Status: DISCONTINUED | OUTPATIENT
Start: 2020-04-11 | End: 2020-04-17 | Stop reason: HOSPADM

## 2020-04-11 RX ORDER — POLYETHYLENE GLYCOL 3350 17 G/17G
17 POWDER, FOR SOLUTION ORAL DAILY PRN
Status: DISCONTINUED | OUTPATIENT
Start: 2020-04-11 | End: 2020-04-17 | Stop reason: HOSPADM

## 2020-04-11 RX ORDER — LORAZEPAM 2 MG/1
2 TABLET ORAL EVERY 6 HOURS PRN
Status: DISCONTINUED | OUTPATIENT
Start: 2020-04-11 | End: 2020-04-11

## 2020-04-11 RX ADMIN — LORAZEPAM 2 MG: 2 INJECTION INTRAMUSCULAR; INTRAVENOUS at 17:38

## 2020-04-11 RX ADMIN — DIPHENHYDRAMINE HYDROCHLORIDE 50 MG: 50 INJECTION, SOLUTION INTRAMUSCULAR; INTRAVENOUS at 17:36

## 2020-04-11 RX ADMIN — HALOPERIDOL DECANOATE 200 MG: 100 INJECTION INTRAMUSCULAR at 18:49

## 2020-04-11 RX ADMIN — HALOPERIDOL LACTATE 10 MG: 5 INJECTION, SOLUTION INTRAMUSCULAR at 17:37

## 2020-04-11 ASSESSMENT — ACTIVITIES OF DAILY LIVING (ADL)
FALL_HISTORY_WITHIN_LAST_SIX_MONTHS: NO
BATHING: 0-->INDEPENDENT
RETIRED_EATING: 0-->INDEPENDENT
AMBULATION: 0-->INDEPENDENT
TRANSFERRING: 0-->INDEPENDENT
DRESS: 0-->INDEPENDENT
WHICH_OF_THE_ABOVE_FUNCTIONAL_RISKS_HAD_A_RECENT_ONSET_OR_CHANGE?: COGNITION
SWALLOWING: 0-->SWALLOWS FOODS/LIQUIDS WITHOUT DIFFICULTY
RETIRED_EATING: 0-->INDEPENDENT
BATHING: 0-->INDEPENDENT
LAUNDRY: UNABLE TO COMPLETE
DRESS: INDEPENDENT
TRANSFERRING: 0-->INDEPENDENT
COGNITION: 2 - DIFFICULTY WITH ORGANIZING THOUGHTS
DRESS: 0-->INDEPENDENT
TOILETING: 0-->INDEPENDENT
ORAL_HYGIENE: INDEPENDENT
RETIRED_COMMUNICATION: 0-->UNDERSTANDS/COMMUNICATES WITHOUT DIFFICULTY
RETIRED_COMMUNICATION: 0-->UNDERSTANDS/COMMUNICATES WITHOUT DIFFICULTY
TOILETING: 0-->INDEPENDENT
SWALLOWING: 0-->SWALLOWS FOODS/LIQUIDS WITHOUT DIFFICULTY
COGNITION: 0 - NO COGNITION ISSUES REPORTED
FALL_HISTORY_WITHIN_LAST_SIX_MONTHS: NO
AMBULATION: 0-->INDEPENDENT
HYGIENE/GROOMING: INDEPENDENT

## 2020-04-11 ASSESSMENT — MIFFLIN-ST. JEOR: SCORE: 1548.22

## 2020-04-11 NOTE — ED NOTES
"Pt requesting to be released from restraints - when told she would have to be relaxed for at least another 15 minutes, she began screaming \"bitch\" and \"I hope you all die from the coronavirus\" to passing staff and writer. Pt began loudly crying while maintaining eye contact with writer. Pt informed these behaviors would increase restraint time. Behaviors ceased.   "

## 2020-04-11 NOTE — ED PROVIDER NOTES
ED Provider Note  [facility]      History     Chief Complaint   Patient presents with     Mental Health Problem     The history is provided by the patient and medical records. The history is limited by the condition of the patient.   Mental Health Problem   Presenting symptoms: agitation    Associated symptoms: anxiety      Ilene Liu is a 30 year old female who is here coming from her group home where she was refusing to take her meds and paranoid and acting out. Patient has history of schizoaffective disorder and long history of noncompliance. She is under commitment and Jarvised. She receives haldol decanoate and was due for an injection 2 days ago. Her ACT team was looking into other med options. Patient was last hospitalized here in February 2020. Patient is acutely paranoid, concerned that people are following her. She is hypervigilant and gets easily agitated. She tried to wrap a blanket around her neck and staff got involved. She took an oral Zyprexa dose 10 mg. She agreed to cooperate but it was short-lived as she then tried to barricade her exam room door and was spitting at the window at people looking in. A code 21 was called and patient was brought back to the main ED for better containment in the seclusion room and restrained. Patient was too acutely psychotic to get reliable clinical information. She has history of substance abuse. This cannot be ascertained presently if she has been using. She has no fever, cough or symptoms of PUI patient.    Please see DEC Crisis Assessment on 04/10/20 in Epic for further details.    Past Medical History  Past Medical History:   Diagnosis Date     Asthma      Cocaine abuse      Depressive disorder      Marijuana use      Schizo-affective type schizophrenia      Past Surgical History:   Procedure Laterality Date     ESOPHAGOSCOPY, GASTROSCOPY, DUODENOSCOPY (EGD), COMBINED N/A 12/17/2018    Procedure: COMBINED ESOPHAGOSCOPY,  "GASTROSCOPY, DUODENOSCOPY (EGD);  Surgeon: Cassie Rahman MD;  Location: [facility]     albuterol (PROAIR HFA/PROVENTIL HFA/VENTOLIN HFA) 108 (90 Base) MCG/ACT inhaler  benztropine (COGENTIN) 0.5 MG tablet  haloperidol (HALDOL) 10 MG tablet  haloperidol decanoate (HALDOL DECANOATE) 100 MG/ML injection  LORazepam (ATIVAN) 1 MG tablet  polyethylene glycol (MIRALAX/GLYCOLAX) packet      No Known Allergies  Past medical history, past surgical history, medications, and allergies were reviewed with the patient.     Family History  No family history on file.  Family history was reviewed with the patient.     Social History  Social History     Tobacco Use     Smoking status: Current Some Day Smoker     Packs/day: 0.25     Smokeless tobacco: Never Used   Substance Use Topics     Alcohol use: Yes     Comment: not currently drinking anything     Drug use: Never     Types: Marijuana, \"Crack\" cocaine      Social history was reviewed with the patient.     Review of Systems   Unable to perform ROS: Psychiatric disorder   Constitutional: Positive for activity change.   HENT: Negative.    Eyes: Negative.    Respiratory: Negative.    Cardiovascular: Negative.    Gastrointestinal: Negative.    Genitourinary: Negative.    Musculoskeletal: Negative.    Neurological: Negative.    Psychiatric/Behavioral: Positive for agitation, behavioral problems, decreased concentration and sleep disturbance. The patient is nervous/anxious.    All other systems reviewed and are negative.        Physical Exam   BP: 118/84  Heart Rate: 99  Temp: 98  F (36.7  C)  Resp: 16  SpO2: 96 %  Physical Exam  Vitals signs and nursing note reviewed.   HENT:      Head: Normocephalic.      Nose: Nose normal.   Eyes:      Pupils: Pupils are equal, round, and reactive to light.   Neck:      Musculoskeletal: Normal range of motion.   Pulmonary:      Effort: Pulmonary effort is normal.   Musculoskeletal: Normal range of motion.   Skin:     General: Skin is warm.   Neurological: "      General: No focal deficit present.      Mental Status: She is alert.   Psychiatric:         Attention and Perception: She does not perceive auditory or visual hallucinations.         Mood and Affect: Mood normal. Affect is labile and inappropriate.         Speech: Speech is tangential.         Behavior: Behavior is agitated, aggressive and combative.         Thought Content: Thought content is paranoid and delusional.         Judgment: Judgment is impulsive and inappropriate.         ED Course      Procedures             No results found for any visits on 04/10/20.  Medications   OLANZapine zydis (zyPREXA) ODT tab 10 mg (has no administration in time range)        Assessments & Plan (with Medical Decision Making)   Patient with schizoaffective disorder who is acutely psychotic and paranoid and acting out aggressively on her paranoia. She will need admission for stabilization. She is placed on a 72 hour hold.    I have reviewed the nursing notes. I have reviewed the findings, diagnosis, plan and need for follow up with the patient.    New Prescriptions    No medications on file       Final diagnoses:   Schizoaffective disorder, bipolar type (H)   History of substance abuse (H)   Noncompliance with medication regimen       --  Bunny Kang MD   Emergency Medicine   [facility] EMERGENCY DEPARTMENT  4/10/2020     Bunny Kang MD  04/10/20 1143

## 2020-04-11 NOTE — ED NOTES
Writer took over care when client arrived to room 14 in adult ED. Client coded in BEC, brought over to main ED on back board and bed. Client placed in 5 point restraints due to risk of harming self. Code team put client in restraints, order obtained from MD. Client started to spit at code team, RN obtained spit snider order and placed on client. After client was put in restraints client started to breathe agonal breaths and shake body, unsure if this was seizure activity or not.

## 2020-04-11 NOTE — PHARMACY-ADMISSION MEDICATION HISTORY
Admission Medication History Completed by Pharmacy    Sources: Outpatient pharmacy fill records ([facility] in [place])     Pertinent changes made to PTA medication list (reason):  Added: none  Removed: none  Changed: lorazepam dosing from 1mg PO BID (filled Feb 2020 for #10) --> 1mg PO daily prn agitation (filled April 2020)    Additional Information:   - All medications refilled within the past month at [facility] pharmacy.   - RN progress note on 4/11/2020 states per  staff, last haloperidol decanoate injection was on 3/11/2020.   - Pharmacist did not confirm last doses of other medications.     [place]  Fill History:   4/2/2020: lorazepam 1mg tablets, qty #30 (30 days)     Prior to Admission medications    Medication Sig Last Dose     albuterol (PROAIR HFA/PROVENTIL HFA/VENTOLIN HFA) 108 (90 Base) MCG/ACT inhaler   Inhale 2 puffs into the lungs every 6 hours as needed for shortness of breath / dyspnea or wheezing      benztropine (COGENTIN) 0.5 MG tablet Take 1 tablet (0.5 mg) by mouth 2 times daily        haloperidol (HALDOL) 10 MG tablet Take 1 tablet (10 mg) by mouth 2 times daily        haloperidol decanoate (HALDOL DECANOATE) 100 MG/ML injection Inject 200 mg into the muscle every 28 days DUE 3/9/2020   3/11/2020     LORazepam (ATIVAN) 1 MG tablet Take 1 mg by mouth daily as needed for agitation        polyethylene glycol (MIRALAX/GLYCOLAX) packet   Take 17 g by mouth daily as needed for constipation        Time spent: 30 minutes  -----------  Clare CadenaD., [facility]  Behavioral Health Inpatient Pharmacist  [facility] ([facility] Emergency Department  Phone: *[phone] ([employer]) or [phone]

## 2020-04-11 NOTE — PROGRESS NOTES
Initial Psychosocial Assessment    I have reviewed the chart, met with the patient, and developed Care Plan.  Information for assessment was obtained from:  Medical Record  Patient was unable to meet with [facility] as she had been in restraints and very irritable.     Presenting Problem:  Patient is a 30-year-old Slov[place] female who was dropped off at [facility] ED by her group home staff due to out of control behavior,aggression and suicidal ideation (attempted to strangle herself with scarf) in emergency department and a behavioral code was called.       History of Mental Health and Chemical Dependency:  Patient has a history of multiple psychiatric hospitalizations and previous civil commitments. Patient has a lengthy history of substance use (methamphetamine,cocaine, marijuana and etoh). Unknown if patient has had a chemical dependency treatment.     Family Description (Constellation, Family Psychiatric History):  Patient has a daughter (8years old) being raised by her mother. Per Medical Record patient has been banned from her mother's apartment due to violent behaviors.  No known family psychiatric history available.     Significant Life Events (Illness, Abuse, Trauma, Death):  Patient has a history of suicide attempts by hanging (2017 while in group home) and overdose of Tylenol August 2018. Patient came from [place] at age 2.     Living Situation:  Patient lives in a group home in [place].     Educational Background:  Patient graduated from [employer] in  2007    Occupational History:  Unemployed    Financial Status:  [employer] [place]    Legal Issues:  Patient is under civil commitment until October 2020.     Ethnic/Cultural Issues:  N/A    Spiritual Orientation:  Lutheran     Service History:  No    Social Functioning (organization, interests):  N/A    Current Treatment Providers are:  [facility] ACT Team:    :  Roxann Coyne:  # [phone]  Nurse practitioner:  Kofi Bolden:   # [phone]  Nurse: Carolyn: # [phone].     Social Service Assessment/Plan:  Patient will have a complete psychiatric assessment and medication management by attending psychiatrist. Patient will be encouraged to attend therapy groups and participate in unit programming. Patient will continue to work with her ACT Team for ongoing support and medication management. Patient will return to her current group home upon discharge. CTC will coordinate disposition and aftercare plan.

## 2020-04-11 NOTE — PROGRESS NOTES
Placed call to  Kenny who is the manager at the [facility]. Wanted to confirm what medications patient had been on prior to admission. Patient did continue on the meds that she was discharged on Feb 11, 2020.  She is on Cogentin 0.5 mg bid, Haldol 10 mg BID and Haldol decanoate 200 mg every 28 days.  She had her last injection on 3-.

## 2020-04-11 NOTE — ED TRIAGE NOTES
Pt while in triage became very delusional and paranoid stating people are after her and we (hospital staff) are in on it.  Pt walked out to smoke and had to be talked down and brought back into the ED.  Pt placed on a hold at this time.  Pt being searched in triage before being taken to a room.

## 2020-04-11 NOTE — PROGRESS NOTES
"Pt in restraints very upset, screaming, struggling against restraints. Pt threatening any staff who look at her stating, \"I will find you and kill you when I get out of here. I hate white people, I hate Luxem[place] people. I will kill all you mother fuckers.\" \"You all are going to hell, God sees what you're doing mother fuckers, and he's coming for you.\"   "

## 2020-04-11 NOTE — ED NOTES
ED to Behavioral Floor Handoff    SITUATION  Ilene Liu is a 30 year old female who speaks English and lives in a home alone The patient arrived in the ED by private car from home with a complaint of Mental Health Problem  .The patient's current symptoms started/worsened 1 day(s) ago and during this time the symptoms have increased.   In the ED, pt was diagnosed with   Final diagnoses:   Schizoaffective disorder, bipolar type (H)   History of substance abuse (H)   Noncompliance with medication regimen        Initial vitals were: BP: 118/84  Heart Rate: 99  Temp: 98  F (36.7  C)  Resp: 16  SpO2: 96 %   --------  Is the patient diabetic? No   If yes, last blood glucose? --     If yes, was this treated in the ED? --  --------  Is the patient inebriated (ETOH) No or Impaired on other substances? No  MSSA done? N/A  Last MSSA score: --    Were withdrawal symptoms treated? N/A  Does the patient have a seizure history? No. If yes, date of most recent seizure--  --------  Is the patient patient experiencing suicidal ideation? reports the following suicide factors: strangling self with scarg    Homicidal ideation? reports current or recent homicidal ideation or behaviors including telling staff she wants to kill them     Self-injurious behavior/urges? reports current or recent self injurious behavior or ideation including strangling with scarf.  ------  Was pt aggressive in the ED Yes  Was a code called Yes  Is the pt now cooperative? Yes  -------  Meds given in ED:   Medications   OLANZapine zydis (zyPREXA) ODT tab 10 mg (10 mg Oral Given 4/10/20 2100)   OLANZapine zydis (zyPREXA) ODT tab 10 mg (10 mg Oral Given 4/10/20 2233)      Family present during ED course? No  Family currently present? No    BACKGROUND  Does the patient have a cognitive impairment or developmental disability? No  Allergies: No Known Allergies.   Social demographics are   Social History     Socioeconomic History     Marital status: Single      "Spouse name: Not on file     Number of children: Not on file     Years of education: Not on file     Highest education level: Not on file   Occupational History     Not on file   Social Needs     Financial resource strain: Not on file     Food insecurity     Worry: Not on file     Inability: Not on file     Transportation needs     Medical: Not on file     Non-medical: Not on file   Tobacco Use     Smoking status: Current Some Day Smoker     Packs/day: 0.25     Smokeless tobacco: Never Used   Substance and Sexual Activity     Alcohol use: Yes     Comment: not currently drinking anything     Drug use: Never     Types: Marijuana, \"Crack\" cocaine     Sexual activity: Not Currently   Lifestyle     Physical activity     Days per week: Not on file     Minutes per session: Not on file     Stress: Not on file   Relationships     Social connections     Talks on phone: Not on file     Gets together: Not on file     Attends Episcopalian service: Not on file     Active member of club or organization: Not on file     Attends meetings of clubs or organizations: Not on file     Relationship status: Not on file     Intimate partner violence     Fear of current or ex partner: Not on file     Emotionally abused: Not on file     Physically abused: Not on file     Forced sexual activity: Not on file   Other Topics Concern     Not on file   Social History Narrative     Not on file        ASSESSMENT  Labs results Labs Ordered and Resulted from Time of ED Arrival Up to the Time of Departure from the ED - No data to display   Imaging Studies: No results found for this or any previous visit (from the past 24 hour(s)).   Most recent vital signs /84   Temp 98  F (36.7  C) (Oral)   Resp 16   LMP 03/29/2020   SpO2 96%    Abnormal labs/tests/findings requiring intervention:---   Pain control: pt had none  Nausea control: pt had none    RECOMMENDATION  Are any infection precautions needed (MRSA, VRE, etc.)? No If yes, what infection? " --  ---  Does the patient have mobility issues? independently. If yes, what device does the pt use? ---  ---  Is patient on 72 hour hold or commitment? Yes If on 72 hour hold, have hold and rights been given to patient? Yes  Are admitting orders written if after 10 p.m. ?Yes  Tasks needing to be completed:---     Rosetta Marshall RN   [phone] [facility]   [phone] [facility]

## 2020-04-11 NOTE — H&P
"[facility]  Psychiatric History and Physical      Patient name: Ilene Liu   MRN: [id number]    Age: 30 year old    YOB: 1990    Identifying information:   The patient is a 30 year old Somalian female who is able to speak adequate English.    Chief complaint:  \" Nothing\"    History of present illness: The patient has a history of schizoaffective disorder who is currently under involuntary commitment for treatment of mental illness with a Aranda order who was sent to the emergency room from her group home for a mental health evaluation.  Symptoms of concern involved worsening psychosis in the context of noncompliance with psychotropic medications for at least 2 days.  It was noted that her injection of Haldol the can of it was due the day prior to admission and she refused to cooperate with the injection.  She had also been refusing the oral supplementation of Haldol for at least 2 days.  She was noted by group [facility] staff as appearing more paranoid, making frequent accusations that others are following her or conspiring against her, triggering emotional distress and agitation.  In the emergency room, she had expressed emotional distress related to psychosis and proceeded to wrap a blanket around her neck as a suicidal gesture.  Records indicate that when interventions were attempted with intent to keep her safe, she barricaded herself in her room, became hostile, and was spitting.  A code 21 was then initiated in the emergency room, resulting with the patient being placed in restraints and given as needed antipsychotic medications.  She was placed on a 72-hour hold and transferred to our behavioral health unit.  Since her arrival, she has been irritable, guarded, and minimally cooperative with interventions.  On examination today, the patient offered little engagement in the interview, initially quickly denying mental health symptoms then becoming " passive and ignoring additional interview questions.  Before she disengaged from the interview, she had denied psychosis and suicidal ideation.    Psychiatric Review of Systems:    -- Depressive episode: Denied symptoms  -- Diandra:  denies symptoms  -- Psychosis:  denies symptoms  -- Anxiety: denies symptoms corresponding to TERRIE or panic disorder  -- PTSD: denies symptoms  -- OCD: denies  symptoms  -- Eating disorder: denies symptoms    Psychiatric History:    The patient has a history of schizoaffective disorder, bipolar type, with prior inpatient hospitalizations noted.  She has a history of self-injurious behavior typically demonstrated through head banging during moments of emotional frustration.  She has attempted suicide 1 time in the past via overdose.  Her outpatient care is managed through the [facility] act team who provide her with monthly injections of Haldol decannulate while under involuntary commitment for treatment of mental illness.    Substance Use History:    Records indicate a history of cannabis use disorder.  She did not answer questions regarding any recent illicit substance usage.  She has not provided a urine sample for toxicology screening.    Medical History: No active issues    No current facility-administered medications on file prior to encounter.   albuterol (PROAIR HFA/PROVENTIL HFA/VENTOLIN HFA) 108 (90 Base) MCG/ACT inhaler, Inhale 2 puffs into the lungs every 6 hours as needed for shortness of breath / dyspnea or wheezing  benztropine (COGENTIN) 0.5 MG tablet, Take 1 tablet (0.5 mg) by mouth 2 times daily  haloperidol (HALDOL) 10 MG tablet, Take 1 tablet (10 mg) by mouth 2 times daily  haloperidol decanoate (HALDOL DECANOATE) 100 MG/ML injection, Inject 200 mg into the muscle every 28 days DUE 3/9/2020  LORazepam (ATIVAN) 1 MG tablet, Take 1 mg by mouth daily as needed for agitation  polyethylene glycol (MIRALAX/GLYCOLAX) packet, Take 17 g by mouth daily as needed for  "constipation         Social History:  Refer to the psychosocial assessment completed by our .  Currently resides at a group home.     Family History:    The patient denied a family history of mental illnesses or suicide attempts    Medical review of systems: 10 systems were reviewed and positive for psychiatric symptoms as noted above otherwise negative    Physical Exam:    B/P: 87/60, T: 96.6, P: 84, R: 16  Estimated body mass index is 29.05 kg/m  as calculated from the following:    Height as of this encounter: 1.676 m (5' 6\").    Weight as of this encounter: 81.6 kg (180 lb).    The rest of the physical examination was reviewed in the emergency room note completed by the emergency room physician.      Mental status examination:  Appearance: Fatigued, poor hygiene, no acute distress  Attitude: Passive  Eye Contact: Poor  Mood: \"Fine\"  Affect: Mostly blunted and near flat  Speech: Tone was irritable, poverty of content, 1-2 word responses  Psychomotor Behavior:  No psychomotor abnormalities noted  Thought Process: Linear and tight  Associations:  Logical; no loose associations Noted  Thought Content: She appeared guarded and paranoid although did not verbalize any specific delusions today.  Records indicate she has made references of paranoid delusions since admission.  Denies suicidal Ideations. Denies homicidal ideations.  Insight: Limited  Judgment: Limited  Oriented to: Person and place  Attention Span and Concentration: Limited  Recent and Remote Memory: Intact based on interviewing and details provided  Language: Appropriate based on interviewing  Fund of Knowledge: Appropriate based on interviewing  Muscle Strength and Tone: Normal upon visual inspection  Gait and Station: Not displayed       Diagnoses:    Schizoaffective disorder-bipolar type  History of cannabis use disorder     Plan:  1.  The patient has been admitted to our behavioral health unit under a 72-hour hold for psychosis and " hostile behaviors in the context of noncompliance with psychotropic medications.  She is also under involuntary commitment for treatment of mental illness with a Aranda order.  We will coordinate her legal status with her county .    2.  Haldol decanoate will be resumed at 200 mg intramuscularly every month along with oral supplementation of Haldol 10 mg twice a day targeting psychotic symptoms.  If documentation from her outpatient providers and group home demonstrate adequate tolerability of the medication, consider administering an additional dose of Haldol to cannulate while scheduling a higher dose during her next monthly injection.  The intent of optimizing her monthly injection would be to minimize usage of oral supplementation.    3. Psychosocial treatments to be addressed with social work consult and groups.    4.  Anticipate discharge back to her group home after she has demonstrated adequate reduction of psychosis.    _________________________________________________  Telemedicine Visit: The patient's condition can be safely assessed and treated via synchronous audio and visual telemedicine encounter.      Reason for Telemedicine Visit: COVID-19    Originating Site (Patient Location): Inpatient psychiatric unit at [facility]    Distant Site (Provider Location): Provider Remote Setting    Consent:  The patient/guardian has verbally consented to: the potential risks and benefits of telemedicine (video visit) versus in person care; bill my insurance or make self-payment for services provided; and responsibility for payment of non-covered services.     Mode of Communication:  Video Conference via Skype    Start time: 12:30 PM  End time: 12:35 PM    As the provider I attest to compliance with applicable laws and regulations related to telemedicine.

## 2020-04-11 NOTE — ED NOTES
Writer explained to client that she would be going up to floor. Client agreed to remain calm and sit in wheelchair to go upstairs. Client was taken out of restraints and responded well. Listened to writer when told to keep arms and legs still while restraints were being discontinued. Client got up and walked

## 2020-04-11 NOTE — PROGRESS NOTES
Items sent to locker:  Bra w/ wire  Pair of slippers  Glasses with one of the hinges/arms missing  Pack of cigarettes with a total of 8 cigarettes in the container  Key  Petra Berry  Lighter  Personal health documents  Cell phone  Skirt  Dress top    A               Admission:  I am responsible for any personal items that are not sent to the safe or pharmacy.  [facility] is not responsible for loss, theft or damage of any property in my possession.    Signature:  _________________________________ Date: _______  Time: _____                                              Staff Signature:  ____________________________ Date: ________  Time: _____      2nd Staff person, if patient is unable/unwilling to sign:    Signature: ________________________________ Date: ________  Time: _____     Discharge:  [facility] has returned all of my personal belongings:    Signature: _________________________________ Date: ________  Time: _____                                          Staff Signature:  ____________________________ Date: ________  Time: _____

## 2020-04-11 NOTE — PROGRESS NOTES
At approximately 1640, pt got up from bed and was looking out the window towards the hallway. During that time she moved in front of door and began to urinate. Pt was asked to use the restroom. RN was notified and came out to speak with her. Pt stated she wanted toilet paper. Writer reminded pt that there was toilet paper in the restroom. She said she did not want to use the restroom and that she would defecate and urinate in her room. Writer once again reminded the pt that there is a restroom down the miller. A couple a mintues later the pt went down the miller and stated she wanted to use the restroom. Writer open the door for pt. A few minutes later pt came out of the restroom with a hand full of toilet paper. She walked back to her room. Pt was reminded that if she needed to use the restroom there was one down the miller. Pt said she would poop or pee in her room because it was her room. Writer asked pt why she didn't want to use the restroom. Pt said she did not like it. Writer asked pt if she had a room with a restroom would she use that? Pt said no. She then proceeded to defecate on the floor.

## 2020-04-11 NOTE — PROGRESS NOTES
"Patient admitted to [facility] [address] [facility] via wheelchair from the ER. Patient had just been taken out of 5 point restraints in ER, and agreed to remain calm and cooperative during admission. Patient did deny any pain, denied being suicidal, and kept stating \"just leave me alone, I want to sleep\" - then would refuse to answer any further questions.  Upset at 1st about having a 1:1 staff watching her - very paranoid & suspicious about having someone watching her, but then settled for sleep with 1:1 staff at patient's window watching over her.  Affect is blunted, appears angry and withdrawn, and unable to repeat any information given with teach back techniques. Patient did state that yes she was hearing voices at first, but then later would not respond any further questions asked regarding this subject.  "

## 2020-04-11 NOTE — PROGRESS NOTES
"Pt screaming, \"You stupid mother rayna, God is coming for you, He created the coronavirus and He is gonna wipe your stupid ass out.\" Pt is being to verbally aggressive to speak to at this time, let alone process discontinuation criteria. PT loudly calling all staff, \"Devil worshippers and fucking illuminati.\"      04/11/20 1730   Restraint Monitoring Q15 Minutes   Psychological Status YE;ST   Physical Comfort D   Circulation NS   Continuous Observation Yes   Restraint Type   Wrist - R (BH) Continued   Wrist - L (BH) Continued   Ankle - R (BH) Continued   Ankle - L (BH) Continued   Chest (BH) Continued     "

## 2020-04-11 NOTE — ED TRIAGE NOTES
Pt states she is being followed and they are watching her and she wants to file a police report.  Pt then states she is not going to be taking her psych meds anymore for schiz because she doesn't have that.

## 2020-04-11 NOTE — PROGRESS NOTES
"Patient appears to have slept a total of 4.75 hr. And is still sleeping at this time. She remains on a 1:1 due to her recent head banging behavior & SIB (tying a scarf around her neck to \"get attention\"). Continue to monitor closely, SIO as ordered for now, reorient as needed and stress coping skills to patient as needed.  "

## 2020-04-11 NOTE — CONSULTS
Brief Medicine Note    RN entered H&P consult in telephone readback ordering mode. Please see the Emergent Department note from this facility for the complete medical evaluation prior to this psych admission. Please consult Internal Medicine if any questions or acute medical concerns arise.     Radha Hernandez PA-C  Hospitalist Service  Pager [phone]

## 2020-04-11 NOTE — ED NOTES
Within an hour after restraint an in person face to face assessment was completed at 22:30, including an evaluation of the patient's immediate reaction to the intervention, behavioral assessment and review/assessment of history, drugs and medications, recent labs, etc., and behavioral condition.  The patient experienced: No adverse effects.   The intervention of restraint or seclusion needs to continue as patient continues to be manipulative and is at risk for spitting on staff. She was offered an additional Zyprexa dose 10 mg which she initially declined but agreed to it if it meant getting out of her restraints faster.      Bunny Kang MD  04/10/20 7678

## 2020-04-11 NOTE — PROGRESS NOTES
"PT struggling against restraints screaming at staff. Pt stating, \"You all trying to kill me, you should have all killed me when I was in that coma. I hate my life and I don't give a fuck what you write. You think I care? I do not care stupid fucker, I'll kill you. I'm gonna tell my family about you. I'll tell everyone you all are following us. I don't care. Fuck the police, fuck the medical field, fuck everyone, I don't give a fuck\" Pt is still very agitated and cannot be spoken to at the moment as any interaction escalates her verbal aggression. Pt cannot state why she is in seclusion and is unable to process discontinuation criteria at this time.      04/11/20 1800   Restraint Monitoring Q15 Minutes   Psychological Status YE;ST   Physical Comfort D   Circulation NS   Continuous Observation Yes   Restraint Type   Wrist - R (BH) Continued   Wrist - L (BH) Continued   Ankle - R (BH) Continued   Ankle - L (BH) Continued   Chest (BH) Continued     "

## 2020-04-11 NOTE — ED NOTES
"Pt c/o poor circulation to hands and restraints hurting her armpits.  Pt able to articulate discomfort appropriately to RN and PA.  Restraints re-adjusted to increase comfort.  Circulation adequate.  Pt informed by RN that she need to show continued in control behavior for another 20 minutes and will need to contract for safe behavior to come out of restraints.  Pt expressed understanding.  Pt does appear calmer but had outburst less than 20\" prior to this note.  "

## 2020-04-11 NOTE — PLAN OF CARE
Patient admitted to [facility] from the [facility] Adult ER, for increased Aggression & Medication non-compliance. Patient was placed on a 72 Hr. Hold in the ER, and is also Committed & Jarvised. Patient became assault remington and also began to Head Bang while in the ER. Patient has known history of SIB, including hx. Of swallowing small items such as pencils. Patient placed on an SIO at this time for her safety.

## 2020-04-11 NOTE — ED NOTES
"Client agreed to maintain calm attitude prior to restraints being discontinued. Client stated that she would not yell, hurt self, or hurt others. Client understood why she went into restraints and stated \"because I was hurting myself and hitting the door.\" Client taken out of restraints as discontinuation criteria met. Walked to wheelchair willingly and went up to floor. Calm and cooperative for transport.   "

## 2020-04-11 NOTE — PROGRESS NOTES
"Pt screaming at writer, \"You stupid ass faggot cracker as bitch, look at you sitting all crosslegged like a bitch with your stupid ass cracker laptop. Write all this shit down you cracker ass bitch. Illuminati can't kill me, fuck the illuminati, fuck your cracker ass.\" Pts nurse is attempting to process discontinuation criteria but pt is too agitated to have a conversation with right now, staff will continue to monitor.      04/11/20 1745   Restraint Monitoring Q15 Minutes   Psychological Status YE;ST   Physical Comfort Other  (pt states she hurts all over and is dehydrated)   Circulation NS   Continuous Observation Yes   Restraint Type   Wrist - R (BH) Continued   Wrist - L (BH) Continued   Ankle - R (BH) Continued   Ankle - L (BH) Continued   Chest (BH) Continued     "

## 2020-04-11 NOTE — PROGRESS NOTES
Pt starting to calm, singing to self. Pt asked how long she has been in restraints and acknowledged staff answer without insulting staff. PT is still unable to process discontinuation criteria. Pt is singing to self and will not engage staff in talking about processing out of restraints at this moment. Staff will continue to monitor.     04/11/20 1815   Restraint Monitoring Q15 Minutes   Psychological Status O  (pt singing to self)   Physical Comfort D   Circulation NS   Continuous Observation Yes   Restraint Type   Wrist - R (BH) Continued   Wrist - L (BH) Continued   Ankle - R (BH) Continued   Ankle - L (BH) Continued   Chest (BH) Continued

## 2020-04-12 PROCEDURE — 12400001 ZZH R&B MH UMMC

## 2020-04-12 PROCEDURE — 25000128 H RX IP 250 OP 636: Performed by: PSYCHIATRY & NEUROLOGY

## 2020-04-12 PROCEDURE — 25000132 ZZH RX MED GY IP 250 OP 250 PS 637: Performed by: PSYCHIATRY & NEUROLOGY

## 2020-04-12 RX ADMIN — BENZTROPINE MESYLATE 0.5 MG: 0.5 TABLET ORAL at 19:20

## 2020-04-12 RX ADMIN — BENZTROPINE MESYLATE 0.5 MG: 0.5 TABLET ORAL at 08:35

## 2020-04-12 RX ADMIN — HALOPERIDOL 10 MG: 10 TABLET ORAL at 19:20

## 2020-04-12 RX ADMIN — HALOPERIDOL LACTATE 10 MG: 5 INJECTION, SOLUTION INTRAMUSCULAR at 15:02

## 2020-04-12 RX ADMIN — DIPHENHYDRAMINE HYDROCHLORIDE 50 MG: 50 INJECTION, SOLUTION INTRAMUSCULAR; INTRAVENOUS at 15:01

## 2020-04-12 RX ADMIN — LORAZEPAM 2 MG: 2 INJECTION INTRAMUSCULAR; INTRAVENOUS at 15:01

## 2020-04-12 RX ADMIN — HALOPERIDOL 10 MG: 10 TABLET ORAL at 08:35

## 2020-04-12 ASSESSMENT — ACTIVITIES OF DAILY LIVING (ADL)
DRESS: SCRUBS (BEHAVIORAL HEALTH)
ORAL_HYGIENE: INDEPENDENT
LAUNDRY: UNABLE TO COMPLETE
ORAL_HYGIENE: INDEPENDENT
HYGIENE/GROOMING: INDEPENDENT
HYGIENE/GROOMING: INDEPENDENT
DRESS: SCRUBS (BEHAVIORAL HEALTH)

## 2020-04-12 NOTE — PROVIDER NOTIFICATION
04/12/20 1540   Seclusion or Restraint Order   In Person Face to Face Assessment Conducted Yes-Eval of pt's immediate situation, reaction to intervention, complete review of systems assessment, behavioral assessment & review/assessment of hx, drugs & meds, recent labs, etc, behavioral condition, need to continue/terminate restraint/seclusion   Patient Experienced No adverse physical outcome from seclusion/restraint initiation   Continuation of Seclus/Restraint indicated at this time Yes   Leadership   Seclus/Restraint >12H or 2x/24H Notified     RN face to face assessment completed. Patient placed in 5 point restraints. Patient attempting to bite, spit, and hit at staff. All lesser restrictive interventions attempted without success. No s/s of injury observed. MD notified.

## 2020-04-12 NOTE — PROVIDER NOTIFICATION
"   04/11/20 1720   Seclusion or Restraint Order   In Person Face to Face Assessment Conducted Yes-Eval of pt's immediate situation, reaction to intervention, complete review of systems assessment, behavioral assessment & review/assessment of hx, drugs & meds, recent labs, etc, behavioral condition, need to continue/terminate restraint/seclusion   Patient Experienced No adverse physical outcome from seclusion/restraint initiation   Continuation of Seclus/Restraint indicated at this time Yes     Patient was screaming at SIO staff.  Would not cooperate with a face to face, just calling staff \"Crakers\" and \"Rednecks\" and yelling about the \"illuminati\".  No insight.  Acutely agitated.  5 point restraints still indicated.    Patient was spitting at staff so unable to closely visualize restraints.  Not cooperative with assessments. Patient appeared in NAD.    Completed a chart review.    Dr. Mcgarry was notified of the results of the face to face.  "

## 2020-04-12 NOTE — PROGRESS NOTES
"Patient refused to allow lbs to be drawn this AM.  Two attempts were made and she refused both times.  Since she needed to be NPO it will be scheduled for tomorrow. When writer/RN took AM scheduled meds to pt her initial response was \"No I want to talk to my Dr.\"  She was told that the Dr was not available today but would be present tomorrow. She then took her meds without any further conversation with writer.  "

## 2020-04-12 NOTE — PROVIDER NOTIFICATION
04/11/20 1710   Justification   Clinical Justification Others      Pt was paranoid and responding loudly to internal stimuli. She endorsed seeing people that were not there and became agitated when oriented to reality.  She urinates on the floor of her room and tries to defecate on the floor  I will poop and pee on the floor because it s my room. I can do anything I want in my room .  Pt was very agitated; she was kicking and slamming door. She was screaming and making accusations that people are following her  stop following me, Illuminati!!! I want to make police report about these people following me around . Pt is paranoid and difficult to redirect. She was making threatening statements towards staff trying to verbal de-escalation her  I am going to fuck you guys up, I hate white people. I hate [place], and I hate you too Lm   . Code 21 called. Oral PRN Haldol, Benadryl, and Haldol were offered. She took the medication from this RN writer threw the meds with a cup full of water at this writer. Pt then postured in a threatening manner and attempted to attack this RN writer. The behavioral staff immediately intervenes and takes control of patient. Due to history of headbanging, she was placed on 5 point restraints. PRN IM Benadryl, Ativan, and Haldol were administered. Dr. Mcgarry notified and gave order for the restraints.

## 2020-04-12 NOTE — PROGRESS NOTES
"Pt alternating between singing to self and screaming \"Fuck the illuminati! Fuck you devil worshippers!!! Fuck the one-eyed devil you Baptist!\" as well as yelling profanities at writer, e.g. \"Fuck you, you cracker ass, white, lumberjack-looking bitch!\"        04/12/20 1600   Restraint Monitoring Q15 Minutes   Psychological Status YE   Physical Comfort D   Circulation NS   Continuous Observation Yes   Restraint Type   Seclusion (BH) Continued   Wrist - R (BH) Continued   Wrist - L (BH) Continued   Ankle - R (BH) Continued   Ankle - L (BH) Continued   Chest (BH) Continued     "

## 2020-04-12 NOTE — PROGRESS NOTES
04/12/20 1248   Behavioral Health   Hallucinations other (see comment)   Thinking other (see comment)   Orientation other (see comment)   Memory other (see comment)   Insight other (see comment)   Judgement impaired   Eye Contact at examiner   Affect other (see comments)   Mood other (see comments)   Hygiene other (see comment)   Activities of Daily Living   Hygiene/Grooming independent   Oral Hygiene independent   Dress scrubs (behavioral health)   Laundry unable to complete   Room Organization independent   Patient slept all morning and she is still in her room sleeping. Writer did not get the chance to interact with her.

## 2020-04-12 NOTE — PROGRESS NOTES
"Pt urinated on floor of her room and escalated from that point forward. She was then offered PRN meds which she initally said she would not take. She then quickly took them and spit them out.  She was restrained in order to put her in 5 point restraints and give her IM PRN meds. During the process she was attempting to scratch staff members. She conttnued to scream and make verbal threats. \"I know where to find you. Wait till I get out if here.  Patient made statements about killing herself after she had been restraints just a few minutes.  She then resumed screaming and making threats to assault others.  "

## 2020-04-12 NOTE — PROVIDER NOTIFICATION
04/12/20 1630   Debriefing   Debriefing DO   Does patient understand why the event happened? Patient unable to answer   Does patient agree to safe behaviors? Yes   What can we do differently so this doesn't happen again? Other (comment)   Plan of care reviewed and modified Yes   Pt reports that she will remain safe on the unit.  She agreed to follow unit rules and to be respectful to staff.  Discontinuation of restraints is appropriate at this time.  [facility] staff will continue to monitor patient.

## 2020-04-12 NOTE — PROVIDER NOTIFICATION
04/11/20 4680   Debriefing   Debriefing DO   Does patient understand why the event happened? Yes   Does patient agree to safe behaviors? Yes   What can we do differently so this doesn't happen again? Other (comment)  (see note)   Plan of care reviewed and modified Yes   After an hour, pt was calm and agreed to safe behaviors on the unit. Pt agreed to take her long-acting Haldol deaconate. Restraints were discontinued.

## 2020-04-12 NOTE — PROGRESS NOTES
"Pt urinated on the floor inside of the door in her room. Afterwards, pt began to escalate, yelling about the Illuminati, filing police reports, and making statements about threatening to kill staff (\"I'll kill you, motherfuckers\" and \"you'll fucking die\" and \"I'm going to get you when I get out! I see you outside of here!\").    Prior to this incident, pt was in her room, napping on and off. Staff asked pt if pt needed anything, and pt declined. Pt did eat meals.  "

## 2020-04-13 PROCEDURE — 25000128 H RX IP 250 OP 636: Performed by: PSYCHIATRY & NEUROLOGY

## 2020-04-13 PROCEDURE — 25000132 ZZH RX MED GY IP 250 OP 250 PS 637: Performed by: PSYCHIATRY & NEUROLOGY

## 2020-04-13 PROCEDURE — 12400001 ZZH R&B MH UMMC

## 2020-04-13 PROCEDURE — 99233 SBSQ HOSP IP/OBS HIGH 50: CPT | Mod: 95 | Performed by: PSYCHIATRY & NEUROLOGY

## 2020-04-13 RX ADMIN — BENZTROPINE MESYLATE 0.5 MG: 0.5 TABLET ORAL at 08:56

## 2020-04-13 RX ADMIN — LORAZEPAM 2 MG: 2 INJECTION INTRAMUSCULAR; INTRAVENOUS at 13:43

## 2020-04-13 RX ADMIN — DIPHENHYDRAMINE HYDROCHLORIDE 50 MG: 50 INJECTION, SOLUTION INTRAMUSCULAR; INTRAVENOUS at 13:43

## 2020-04-13 RX ADMIN — NICOTINE POLACRILEX 4 MG: 2 GUM, CHEWING BUCCAL at 17:15

## 2020-04-13 RX ADMIN — HALOPERIDOL 10 MG: 10 TABLET ORAL at 16:25

## 2020-04-13 RX ADMIN — HALOPERIDOL 10 MG: 10 TABLET ORAL at 08:56

## 2020-04-13 RX ADMIN — BENZTROPINE MESYLATE 0.5 MG: 0.5 TABLET ORAL at 16:25

## 2020-04-13 RX ADMIN — HALOPERIDOL LACTATE 10 MG: 5 INJECTION, SOLUTION INTRAMUSCULAR at 13:43

## 2020-04-13 ASSESSMENT — ACTIVITIES OF DAILY LIVING (ADL)
DRESS: SCRUBS (BEHAVIORAL HEALTH);INDEPENDENT
HYGIENE/GROOMING: INDEPENDENT
ORAL_HYGIENE: INDEPENDENT

## 2020-04-13 NOTE — PROGRESS NOTES
Writer spoke with Carolyn, patient's ACT team nurse who reports that patient was missing from her group home for two days at the end of last week, and they suspect the psychosis is drug induced. She also reported that patient did miss her last injection but it was because they were adding ability to her medication regiment. Writer asked if they were planning on revoking the provisional discharge, but Carolyn said they were undecided on that. Writer stated that the hospital would prefer that her PD be revoked.

## 2020-04-13 NOTE — PROVIDER NOTIFICATION
"   04/13/20 1632   Restraint Type   Wrist - R (BH) Discontinued   Wrist - L (BH) Discontinued   Ankle - R (BH) Discontinued   Ankle - L (BH) Discontinued   Chest (BH) Discontinued   Debriefing   Debriefing DO   Does patient understand why the event happened? Yes   Does patient agree to safe behaviors? Yes   What can we do differently so this doesn't happen again? Other (comment)  (Pt reports, \"I just want to get out of here!\")   Plan of care reviewed and modified Yes     Patient was able to engage in debriefing.  She requested and was given her HS dose of Haldol and Cogentin early, upon restraint discontinuation.  She denies that she is experiencing any overt psychotic symptoms or dangerous ideations.  She appears calm and able to follow staff directives at this time.  Will continue to monitor closely on SIO 2:1 male only staffing pattern.    "

## 2020-04-13 NOTE — PROVIDER NOTIFICATION
04/13/20 1345   Seclusion or Restraint Order   In Person Face to Face Assessment Conducted Yes-Eval of pt's immediate situation, reaction to intervention, complete review of systems assessment, behavioral assessment & review/assessment of hx, drugs & meds, recent labs, etc, behavioral condition, need to continue/terminate restraint/seclusion   Patient Experienced No adverse physical outcome from seclusion/restraint initiation   Continuation of Seclus/Restraint indicated at this time Yes     Pt extremely physical aggressive with staff. Immediate action required for safety of staff and other patients. Pt unable to de-escalate. Pt back boarded to her room, given IM medication and place in 5-point restraints for safety. No physical harm to patient from initiation of restraints.

## 2020-04-13 NOTE — PLAN OF CARE
"Pt began shift in 5 point restraints.  She could be heard screaming, \"fuck the illuminati!\"  She also screamed at staff and other patients during this time.   Patient was eventually able to process out of restraints and contracted for safety on the unit.  She told RN writer that she just needed her haldol injection, and now she is \"ready to go home.\"  She has poor insight into her mental health and take no responsibility for her behaviors. Continuation of SIO is appropriate at this time due to patients long history of impulsiveness and aggression.   She currently denies SI/SIB/HI or any psychotic symptoms.  She was medication compliant.   "

## 2020-04-13 NOTE — PROVIDER NOTIFICATION
04/13/20 1342   Seclusion or Restraint Order   In Person Face to Face Assessment Conducted Yes-Eval of pt's immediate situation, reaction to intervention, complete review of systems assessment, behavioral assessment & review/assessment of hx, drugs & meds, recent labs, etc, behavioral condition, need to continue/terminate restraint/seclusion   Patient Experienced No adverse physical outcome from seclusion/restraint initiation   Continuation of Seclus/Restraint indicated at this time Yes       Face to face examination completed with no adverse physical outcomes noted and continuation of restraints indicated at this time. Attending provider Dr. Allen notified of results of face to face. Upon assessment patient screaming and threatening staff unable to cooperate with face to face assessment. No observed adverse physical outcomes observed with visual assessment.

## 2020-04-13 NOTE — PROGRESS NOTES
Writer called patient's ACT team , nurse practitioner, and nurse but no one answered. Writer expressed wanting more information about why patient was admitted, and possible chemical use. Hospital team feels that revoking the patient's Provisional Discharge is necessary.
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
## 2020-04-13 NOTE — PROGRESS NOTES
"Patient yelling that she\" wants to die!\"  \"Today is my last day I am going to kill myself!\".  "
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
## 2020-04-13 NOTE — PROGRESS NOTES
"Patient continues to threaten staff, using derogatory names and epithets. \"I will fuck you all up when I get out of here!\"  "

## 2020-04-14 PROCEDURE — 99233 SBSQ HOSP IP/OBS HIGH 50: CPT | Mod: 95 | Performed by: PSYCHIATRY & NEUROLOGY

## 2020-04-14 PROCEDURE — 25000132 ZZH RX MED GY IP 250 OP 250 PS 637: Performed by: PSYCHIATRY & NEUROLOGY

## 2020-04-14 PROCEDURE — 12400001 ZZH R&B MH UMMC

## 2020-04-14 RX ORDER — HALOPERIDOL 5 MG/ML
10 INJECTION INTRAMUSCULAR EVERY 6 HOURS PRN
Status: DISCONTINUED | OUTPATIENT
Start: 2020-04-14 | End: 2020-04-17 | Stop reason: HOSPADM

## 2020-04-14 RX ORDER — HALOPERIDOL 10 MG/1
10 TABLET ORAL 2 TIMES DAILY
Status: DISCONTINUED | OUTPATIENT
Start: 2020-04-14 | End: 2020-04-17 | Stop reason: HOSPADM

## 2020-04-14 RX ORDER — HALOPERIDOL 5 MG/1
5-10 TABLET ORAL EVERY 6 HOURS PRN
Status: DISCONTINUED | OUTPATIENT
Start: 2020-04-14 | End: 2020-04-17 | Stop reason: HOSPADM

## 2020-04-14 RX ORDER — HALOPERIDOL 5 MG/ML
10 INJECTION INTRAMUSCULAR 2 TIMES DAILY
Status: DISCONTINUED | OUTPATIENT
Start: 2020-04-14 | End: 2020-04-17 | Stop reason: HOSPADM

## 2020-04-14 RX ADMIN — HALOPERIDOL 10 MG: 10 TABLET ORAL at 16:58

## 2020-04-14 RX ADMIN — DIPHENHYDRAMINE HYDROCHLORIDE 50 MG: 50 CAPSULE ORAL at 16:58

## 2020-04-14 RX ADMIN — HALOPERIDOL 10 MG: 10 TABLET ORAL at 08:38

## 2020-04-14 RX ADMIN — NICOTINE POLACRILEX 4 MG: 2 GUM, CHEWING BUCCAL at 13:07

## 2020-04-14 RX ADMIN — NICOTINE POLACRILEX 4 MG: 2 GUM, CHEWING BUCCAL at 14:42

## 2020-04-14 RX ADMIN — BENZTROPINE MESYLATE 0.5 MG: 0.5 TABLET ORAL at 08:38

## 2020-04-14 RX ADMIN — LORAZEPAM 2 MG: 1 TABLET ORAL at 16:58

## 2020-04-14 RX ADMIN — TRAZODONE HYDROCHLORIDE 50 MG: 50 TABLET ORAL at 19:40

## 2020-04-14 RX ADMIN — BENZTROPINE MESYLATE 0.5 MG: 0.5 TABLET ORAL at 19:36

## 2020-04-14 RX ADMIN — HYDROXYZINE HYDROCHLORIDE 25 MG: 25 TABLET ORAL at 19:41

## 2020-04-14 ASSESSMENT — ACTIVITIES OF DAILY LIVING (ADL)
ORAL_HYGIENE: INDEPENDENT
ORAL_HYGIENE: INDEPENDENT
HYGIENE/GROOMING: INDEPENDENT
DRESS: INDEPENDENT
HYGIENE/GROOMING: INDEPENDENT
DRESS: SCRUBS (BEHAVIORAL HEALTH)
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
## 2020-04-14 NOTE — PROGRESS NOTES
Current Diagnosis/Procedure:  Schizoaffective disorder-bipolar type  History of cannabis use disorder        Work Completed:    Writer spoke with her CM who revoked her provisional discharge.       Discharge plan or goal:  If patient improves the goal is to get her back to her group home by the end of next week.          Barriers to discharge:  Patient continues to display psychotic and violent behaviors, she will need to clear prior to discharge.

## 2020-04-14 NOTE — PLAN OF CARE
"Ilene was in her room most of the morning, sleeping on and off. She declined morning breakfast, declined vitals, but did take her morning medications without issue. She was in her room basically until lunch. She ate 100% of her lunch and was up and active since lunch. She had periods of being quiet in her room, and periods of listening to her music in her room quietly, but would also come out of her room screming at top volume that she needed to get out of the hospital. She also had frequent request to speak with the  to get her out of the hospital.     During one of her loud screaming episodes she darted out of her room \"someone call 911! \" at top volume over and over. She successfully made a phone call to 911 to \"file a report\" before the phones where turned off.      She does not appear to be responding to AH or have overt psychosis. During one of her loud screaming periods staff were attempting to redirect her to her room as she was being very disruptive to the unit. During this period she was able to have lucid two way conversation essentially negotiating with staff that even if she might not be allowed to scream in the lounge, once she was in her room she was free to do as she pleased, even if that meant screaming at top volume.   "

## 2020-04-14 NOTE — PROGRESS NOTES
Current Diagnosis/Procedure:  Schizoaffective Disorder- Biopolar Type         Work Completed:    Writer spoke with Kenny with [facility] and she reports that the patient is welcome to come back at any point.           Discharge plan or goal:  Patient will discharge to [facility] when the doctor feels she is appropriate.          Barriers to discharge:  No observable barriers at present time.
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
## 2020-04-14 NOTE — PROGRESS NOTES
Writer spoke with Kenny with [facility] in [place], and they are happy to have her home whenever.

## 2020-04-15 PROCEDURE — 25000132 ZZH RX MED GY IP 250 OP 250 PS 637: Performed by: PSYCHIATRY & NEUROLOGY

## 2020-04-15 PROCEDURE — 12400001 ZZH R&B MH UMMC

## 2020-04-15 PROCEDURE — 99233 SBSQ HOSP IP/OBS HIGH 50: CPT | Mod: 95 | Performed by: PSYCHIATRY & NEUROLOGY

## 2020-04-15 RX ORDER — HYDROXYZINE HYDROCHLORIDE 25 MG/1
25-50 TABLET, FILM COATED ORAL EVERY 4 HOURS PRN
Status: DISCONTINUED | OUTPATIENT
Start: 2020-04-15 | End: 2020-04-17 | Stop reason: HOSPADM

## 2020-04-15 RX ADMIN — NICOTINE POLACRILEX 4 MG: 2 GUM, CHEWING BUCCAL at 17:16

## 2020-04-15 RX ADMIN — TRAZODONE HYDROCHLORIDE 50 MG: 50 TABLET ORAL at 18:55

## 2020-04-15 RX ADMIN — BENZTROPINE MESYLATE 0.5 MG: 0.5 TABLET ORAL at 09:48

## 2020-04-15 RX ADMIN — HYDROXYZINE HYDROCHLORIDE 25 MG: 25 TABLET ORAL at 03:10

## 2020-04-15 RX ADMIN — TRAZODONE HYDROCHLORIDE 50 MG: 50 TABLET ORAL at 21:56

## 2020-04-15 RX ADMIN — HALOPERIDOL 10 MG: 10 TABLET ORAL at 09:48

## 2020-04-15 RX ADMIN — HYDROXYZINE HYDROCHLORIDE 50 MG: 25 TABLET, FILM COATED ORAL at 21:56

## 2020-04-15 RX ADMIN — HALOPERIDOL 10 MG: 10 TABLET ORAL at 18:55

## 2020-04-15 RX ADMIN — TRAZODONE HYDROCHLORIDE 50 MG: 50 TABLET ORAL at 03:10

## 2020-04-15 RX ADMIN — BENZTROPINE MESYLATE 0.5 MG: 0.5 TABLET ORAL at 18:56

## 2020-04-15 ASSESSMENT — ACTIVITIES OF DAILY LIVING (ADL)
HYGIENE/GROOMING: INDEPENDENT
ORAL_HYGIENE: INDEPENDENT
DRESS: SCRUBS (BEHAVIORAL HEALTH);INDEPENDENT
DRESS: SCRUBS (BEHAVIORAL HEALTH)
ORAL_HYGIENE: INDEPENDENT
LAUNDRY: UNABLE TO COMPLETE
LAUNDRY: UNABLE TO COMPLETE
HYGIENE/GROOMING: INDEPENDENT
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
## 2020-04-15 NOTE — PROGRESS NOTES
Patient appears to have slept only a total of 5.25 hrs during the night.  Patient up to  Bathroom x 2, requested & was given Trazodone (repeat) for sleep & Hydroxyzine PO for c/o anxiety.  Patient was polite when being given her medication (which she had asked for & was given at 03:10AM), then settled back to sleep. Patient appeared to be somewhat subdued when she was awake, needing less verbal redirection than usual.
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
## 2020-04-16 VITALS
WEIGHT: 180 LBS | TEMPERATURE: 98.1 F | HEIGHT: 66 IN | BODY MASS INDEX: 28.93 KG/M2 | HEART RATE: 81 BPM | RESPIRATION RATE: 16 BRPM | SYSTOLIC BLOOD PRESSURE: 133 MMHG | DIASTOLIC BLOOD PRESSURE: 92 MMHG | OXYGEN SATURATION: 100 %

## 2020-04-16 PROCEDURE — 25000132 ZZH RX MED GY IP 250 OP 250 PS 637: Performed by: PSYCHIATRY & NEUROLOGY

## 2020-04-16 PROCEDURE — H2032 ACTIVITY THERAPY, PER 15 MIN: HCPCS

## 2020-04-16 PROCEDURE — 12400001 ZZH R&B MH UMMC

## 2020-04-16 PROCEDURE — G0177 OPPS/PHP; TRAIN & EDUC SERV: HCPCS

## 2020-04-16 PROCEDURE — 99232 SBSQ HOSP IP/OBS MODERATE 35: CPT | Mod: 95 | Performed by: PSYCHIATRY & NEUROLOGY

## 2020-04-16 RX ADMIN — NICOTINE POLACRILEX 4 MG: 2 GUM, CHEWING BUCCAL at 17:23

## 2020-04-16 RX ADMIN — BENZTROPINE MESYLATE 0.5 MG: 0.5 TABLET ORAL at 08:16

## 2020-04-16 RX ADMIN — NICOTINE POLACRILEX 4 MG: 2 GUM, CHEWING BUCCAL at 10:18

## 2020-04-16 RX ADMIN — LORAZEPAM 1 MG: 1 TABLET ORAL at 19:12

## 2020-04-16 RX ADMIN — TRAZODONE HYDROCHLORIDE 50 MG: 50 TABLET ORAL at 19:12

## 2020-04-16 RX ADMIN — HALOPERIDOL 10 MG: 10 TABLET ORAL at 19:12

## 2020-04-16 RX ADMIN — BENZTROPINE MESYLATE 0.5 MG: 0.5 TABLET ORAL at 19:12

## 2020-04-16 RX ADMIN — HALOPERIDOL 10 MG: 10 TABLET ORAL at 08:17

## 2020-04-16 ASSESSMENT — ACTIVITIES OF DAILY LIVING (ADL)
LAUNDRY: UNABLE TO COMPLETE
HYGIENE/GROOMING: INDEPENDENT
DRESS: SCRUBS (BEHAVIORAL HEALTH);INDEPENDENT
ORAL_HYGIENE: INDEPENDENT

## 2020-04-16 NOTE — PROGRESS NOTES
Pt participated in dance/movement therapy (DMT) by stretching and sharing about her plans for discharge tomorrow.  She was pleasant, organized, and socially-engaged.       04/16/20 1330   Dance Movement Therapy   Type of Intervention structured groups   Response participates, initiates socially appropriate   Hours 0.5

## 2020-04-16 NOTE — PROGRESS NOTES
Pt attended group today for the first time since admission.  Pleasant and cooperative, chose to work on a preprinted canvas acrylic painting for her daughter.  Worked carefully the entire session, and was consistently friendly.  Looking forward to discharge tomorrow.
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
## 2020-04-16 NOTE — DISCHARGE INSTRUCTIONS
Behavioral Discharge Planning and Instructions      Summary:  You were admitted on 4/10/2020  due to Schizoaffective disorder.  You were treated by Dr. Aidee Allen MD and discharged on 4/17/2020 from [facility]      Principal Diagnosis:   Schizoaffective disorder-bipolar type      Health Care Follow-up Appointments:   Res [facility] ACT Team:     SUMIT Campoverde [phone]    RAFITA Cifuentes [phone]    DAVID Leon [phone]  [facility]: [phone]  Attend all scheduled appointments with your outpatient providers. Call at least 24 hours in advance if you need to reschedule an appointment to ensure continued access to your outpatient providers.   Major Treatments, Procedures and Findings:  You were provided with: a psychiatric assessment, assessed for medical stability, group therapy and milieu management    Symptoms to Report: feeling more aggressive, increased confusion, losing more sleep, mood getting worse or thoughts of suicide    Early warning signs can include: increased depression or anxiety sleep disturbances increased thoughts or behaviors of suicide or self-harm  increased unusual thinking, such as paranoia or hearing voices    Safety and Wellness:  Take all medicines as directed.  Make no changes unless your doctor suggests them.      Follow treatment recommendations.  Refrain from alcohol and non-prescribed drugs.  Ask your support system to help you reduce your access to items that could harm yourself or others. If there is a concern for safety, call 111.    Resources:   Crisis Intervention: 486.279.9709 or 850-364-1242 (TTY: 286.520.5792).  Call anytime for help.  [place]ness (www.[place].jose.org): 520.531.6244 or 413-374-8711.  Suicide Awareness Voices of Education (SAVE) (www.save.org): 879-900-CHUV (0408)  National Suicide Prevention Line (www.mentalhealthmn.org): 527-309-WQHA (2563)  Mental Health Consumer/Survivor Network of [place]  "(www.mhcsn.net): 412-633-1691 or 207-488-7162  Mental Health Association of [place] (www.mentalhealth.org): 215.635.7267 or 497-905-1418  Self- Management and Recovery Training., SMART-- Toll free: 793.926.5729  www.[employer].Cardiosonic  [facility] Crisis (COPE) Response - Adult 584 815-6891  Text 4 Life: txt \"LIFE\" to 36298 for immediate support and crisis intervention  Crisis text line: Text \"[place]\" to 986327. Free, confidential, 24/7.  Crisis Intervention: 978.272.2047 or 736-993-4004. Call anytime for help.       The treatment team has appreciated the opportunity to work with you.   If you would like to obtain any specific documentation regarding your hospitalization after your discharge, contact [facility] of Information/Medical Records:  [phone]        "

## 2020-04-16 NOTE — PROGRESS NOTES
04/16/20 1249   Behavioral Health   Hallucinations denies / not responding to hallucinations   Thinking intact   Orientation person: oriented;place: oriented   Memory baseline memory   Insight poor   Judgement impaired   Eye Contact at examiner   Affect full range affect   Mood mood is calm   Physical Appearance/Attire appears stated age;attire appropriate to age and situation;neat   Hygiene well groomed   Suicidality other (see comments)  (none noted)   1. Wish to be Dead (Recent)   (none noted)   2. Non-Specific Active Suicidal Thoughts (Recent)   (none noted)   Self Injury other (see comment)  (none noted)   Elopement   (none noted)   Activity other (see comment)  (normal activity)   Speech clear;coherent   Medication Sensitivity no stated side effects;no observed side effects   Psychomotor / Gait balanced;steady   Overt Aggression Scale   Verbal Aggression 0   Aggression against Property 0   Auto-Aggression 0   Physical Aggression 0   Overt Aggression Total Score 0   Activities of Daily Living   Hygiene/Grooming independent   Oral Hygiene independent   Dress scrubs (behavioral health);independent   Laundry unable to complete   Room Organization independent     Pt had a good day. Pt was calm and respectful with staff and peers. Pt attended group. No behavioral concerns.

## 2020-04-16 NOTE — PLAN OF CARE
Problem: Adult Behavioral Health Plan of Care  Goal: Plan of Care Review  Outcome: Improving   Pt spent the majority of this shift in her room. She was pleasant and social when in the milieu. No behavioral issue this shift. Pt is focusing on discharge; she asked several times this evening if the doctor will discharge her tomorrow. She took scheduled medication without any issue. She continues on SIO 2:1, male staff only.  She endorsed depression rated to her stay in the hospital.

## 2020-04-16 NOTE — PROGRESS NOTES
Diagnosis/Procedure:   Patient Active Problem List   Diagnosis     Schizoaffective disorder, bipolar type (H)     Cocaine use disorder (H)     Cannabis use disorder, moderate, dependence (H)     Cigarette nicotine dependence with withdrawal     Noncompliance with medication regimen     Positive QuantiFERON-TB Gold test     Self-injurious behavior     Psychiatric pseudoseizure     Hematoma of frontal scalp     Suicidal ideation     Injury of head, initial encounter         Work Completed:Writer spoke with Kenny at [facility] ([phone]) to inform her that patient will likely discharge tomorrow. Kenny is in support of this and will arrange transportation for tomorrow. Kenny has requested that any medication changes be ordered to [facility] in [place].    --Discharge plan or goal: Discharge tomorrow to [facility]                 Barriers to discharge: No barriers

## 2020-04-17 PROCEDURE — 25000132 ZZH RX MED GY IP 250 OP 250 PS 637: Performed by: PSYCHIATRY & NEUROLOGY

## 2020-04-17 PROCEDURE — G0177 OPPS/PHP; TRAIN & EDUC SERV: HCPCS

## 2020-04-17 PROCEDURE — 99239 HOSP IP/OBS DSCHRG MGMT >30: CPT | Mod: 95 | Performed by: PSYCHIATRY & NEUROLOGY

## 2020-04-17 RX ORDER — HALOPERIDOL DECANOATE 100 MG/ML
200 INJECTION INTRAMUSCULAR
Start: 2020-04-17

## 2020-04-17 RX ORDER — ALBUTEROL SULFATE 90 UG/1
2 AEROSOL, METERED RESPIRATORY (INHALATION) EVERY 6 HOURS PRN
Qty: 1 INHALER | Refills: 1 | Status: SHIPPED | OUTPATIENT
Start: 2020-04-17

## 2020-04-17 RX ADMIN — BENZTROPINE MESYLATE 0.5 MG: 0.5 TABLET ORAL at 10:19

## 2020-04-17 RX ADMIN — HALOPERIDOL 10 MG: 10 TABLET ORAL at 10:18
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
## 2020-04-17 NOTE — PROGRESS NOTES
Pt was active and social in the lounge for evening shift. Went to a group. No aggressive behaviors. Calm and cooperative with medications. Says she is looking forward to leaving the hospital in the morning.

## 2020-04-17 NOTE — PROGRESS NOTES
Patient is scheduled to go home today. Denies SI/SIB. She is happy to be leaving. All belongings returned. Patient will be picked up by the  staff. No medications sent with patient.

## 2020-04-17 NOTE — PROGRESS NOTES
Patient signed provisional discharge, writer sent it in to [facility], to her CM and placed copy in chart.

## 2020-04-17 NOTE — PROGRESS NOTES
Pt attended 2 hours of morning OT groups.  Feeling positive about discharge today.  Spent time coming up with a new project - searched for an animation picture to paint for her daughter.  Once set up, was able to carefully transfer the image to a large canvas.  Friendly conversation, focused on her daughter, her interests, and pts own delight in being able to make this for her.  Pt was given watercolors to complete activity, which she was excited about, and acknowledged this is something she will continue to do as a coping skill.

## 2020-04-23 DIAGNOSIS — F17.213 CIGARETTE NICOTINE DEPENDENCE WITH WITHDRAWAL: ICD-10-CM

## 2020-04-24 NOTE — TELEPHONE ENCOUNTER
Refill denied     only seen in hosptial   [place] [facility] [place]

## 2020-05-08 RX ORDER — NICOTINE 21 MG/24HR
1 PATCH, TRANSDERMAL 24 HOURS TRANSDERMAL EVERY 24 HOURS
Qty: 30 PATCH | Refills: 0 | Status: SHIPPED | OUTPATIENT
Start: 2020-05-08

## 2020-06-03 ENCOUNTER — MEDICAL CORRESPONDENCE (OUTPATIENT)
Dept: HEALTH INFORMATION MANAGEMENT | Facility: CLINIC | Age: 30
End: 2020-06-03

## 2020-07-15 ENCOUNTER — HOSPITAL ENCOUNTER (EMERGENCY)
Facility: CLINIC | Age: 30
Discharge: HOME | End: 2020-07-15
Attending: EMERGENCY MEDICINE | Admitting: EMERGENCY MEDICINE
Payer: COMMERCIAL

## 2020-07-15 VITALS
SYSTOLIC BLOOD PRESSURE: 131 MMHG | OXYGEN SATURATION: 99 % | DIASTOLIC BLOOD PRESSURE: 85 MMHG | RESPIRATION RATE: 16 BRPM

## 2020-07-15 DIAGNOSIS — K59.00 CONSTIPATION, UNSPECIFIED CONSTIPATION TYPE: ICD-10-CM

## 2020-07-15 PROCEDURE — 99282 EMERGENCY DEPT VISIT SF MDM: CPT

## 2020-07-15 ASSESSMENT — ENCOUNTER SYMPTOMS
BLOOD IN STOOL: 0
CONSTIPATION: 1
FEVER: 0
ABDOMINAL PAIN: 0
VOMITING: 0
ABDOMINAL DISTENTION: 1

## 2020-07-15 NOTE — ED TRIAGE NOTES
Patient complaining about constipation for one month.      Has tried laxatives that work for a little while but constipation returns.      Last BM was one week ago.      ABCs intact.  Alert and oriented x 3.

## 2020-07-15 NOTE — ED AVS SNAPSHOT
[facility] Emergency Department  [address]  [facility] [address]  Phone:  [phone]  Fax:  [phone]                                    Ilene Liu   MRN: [id number]    Department:  [facility] Emergency Department   Date of Visit:  7/15/2020           After Visit Summary Signature Page    I have received my discharge instructions, and my questions have been answered. I have discussed any challenges I see with this plan with the nurse or doctor.    ..........................................................................................................................................  Patient/Patient Representative Signature      ..........................................................................................................................................  Patient Representative Print Name and Relationship to Patient    ..................................................               ................................................  Date                                   Time    ..........................................................................................................................................  Reviewed by Signature/Title    ...................................................              ..............................................  Date                                               Time          22EPIC Rev 08/18

## 2020-07-15 NOTE — ED PROVIDER NOTES
History     Chief Complaint:  Constipation    HPI   Ilene Liu is a 30 year old female current tobacco and drug user who presents with constipation. Patient states she has had constipation for the past month, with her last bowel movement one week ago. The patient has tried over the counter stool softeners with minor relief, but reports that the constipation returns quickly. She has not tried an enema. She complains of bloating and fullness in her upper abdomen, as well as pain with bowel movements. She denies any vomiting, fever, blood in stool, or abdominal pain. The patient denies any chance of pregnancy. The patient has not had an abdominal surgeries. The patient states last menstrual cycle finished recently.     Allergies:  No known drug allergies    Medications:    Senkot  Melatonin   Albuterol   Ativan   Cogentin   Depakote  Catapres    Past Medical History:    Asthma   Cocaine abuse  Depression   Marijuana use  Schizo-affective type schizophrenia  Self-injurious behavior   Borderline personality disorder  Polysubstance abuse    Past Surgical History:    ECG Combined     Family History:    Mother: diabetes     Social History:  Smoking status: current  Alcohol use: yes  Drug use: marijuana, cocaine   The patient presents to the emergency department alone by personal vehicle     PCP: No Ref-Primary, Physician  Marital Status:  Single [1]    Review of Systems   Constitutional: Negative for fever.   Gastrointestinal: Positive for abdominal distention and constipation. Negative for abdominal pain, blood in stool and vomiting.   All other systems reviewed and are negative.      Physical Exam     Patient Vitals for the past 24 hrs:   BP Heart Rate Resp SpO2   07/15/20 1148 131/85 100 16 99 %       Physical Exam    HEENT:  mmm  Neck: supple  CV: ppi, regular   Resp: speaking in full sentences without any resp distress  Abd: abdomen is soft without significant tenderness, masses, organomegaly or  guarding  Rectal:  Done with female RN in room.  Large skin tag but no distended, bleeding hemorrhoid or fissure or fluctuance  Ext: peripheral edema present:  No  Skin: warm dry well perfused  Neuro: Alert, no gross motor or sensory deficits,  gait stable        Emergency Department Course   Interventions:  1251 pink lady enema 286 mL Rectal     Emergency Department Course:  Past medical records, nursing notes, and vitals reviewed.  1228: I performed an exam of the patient and obtained history, as documented above.     I rechecked the patient and performed a rectal exam.     I rechecked the patient. I reviewed the results with the Patient and answered all related questions prior to discharge.     Findings and plan explained to the Patient. Patient discharged home with instructions regarding supportive care, medications, and reasons to return. The importance of close follow-up was reviewed.   Impression & Plan   Medical Decision Makin y F with perianal pain and decreased stool output, acute on chronic.  Abd soft, periana exam without thrombosed hemorrhoid, fissure, or abscess.  Improved after enema and BN in ED.  DC home, continue stool softeners.      Diagnosis:    ICD-10-CM    1. Constipation, unspecified constipation type  K59.00        Disposition:  Discharged to home.    Zuri Montiel  7/15/2020   [facility] EMERGENCY DEPARTMENT  Scribe Disclosure:  I, Zuri Montiel, am serving as a scribe at 12:28 PM on 7/15/2020 to document services personally performed by Casey Benton MD based on my observations and the provider's statements to me.        Casey Benton MD  07/15/20 7936

## 2020-08-10 DIAGNOSIS — F25.0 SCHIZOAFFECTIVE DISORDER, MIXED TYPE (H): Primary | ICD-10-CM

## 2020-08-10 LAB
BASOPHILS # BLD AUTO: 0 10E9/L (ref 0–0.2)
BASOPHILS NFR BLD AUTO: 0.2 %
DIFFERENTIAL METHOD BLD: NORMAL
EOSINOPHIL # BLD AUTO: 0.2 10E9/L (ref 0–0.7)
EOSINOPHIL NFR BLD AUTO: 5 %
ERYTHROCYTE [DISTWIDTH] IN BLOOD BY AUTOMATED COUNT: 12.4 % (ref 10–15)
HBA1C MFR BLD: 5.4 % (ref 0–5.6)
HCT VFR BLD AUTO: 40.4 % (ref 35–47)
HGB BLD-MCNC: 14 G/DL (ref 11.7–15.7)
LYMPHOCYTES # BLD AUTO: 1.4 10E9/L (ref 0.8–5.3)
LYMPHOCYTES NFR BLD AUTO: 33.5 %
MCH RBC QN AUTO: 31.7 PG (ref 26.5–33)
MCHC RBC AUTO-ENTMCNC: 34.7 G/DL (ref 31.5–36.5)
MCV RBC AUTO: 92 FL (ref 78–100)
MONOCYTES # BLD AUTO: 0.4 10E9/L (ref 0–1.3)
MONOCYTES NFR BLD AUTO: 9.3 %
NEUTROPHILS # BLD AUTO: 2.2 10E9/L (ref 1.6–8.3)
NEUTROPHILS NFR BLD AUTO: 52 %
PLATELET # BLD AUTO: 200 10E9/L (ref 150–450)
RBC # BLD AUTO: 4.41 10E12/L (ref 3.8–5.2)
VALPROATE SERPL-MCNC: 74 MG/L (ref 50–100)
WBC # BLD AUTO: 4.2 10E9/L (ref 4–11)

## 2020-08-10 PROCEDURE — 83036 HEMOGLOBIN GLYCOSYLATED A1C: CPT | Performed by: NURSE PRACTITIONER

## 2020-08-10 PROCEDURE — 80164 ASSAY DIPROPYLACETIC ACD TOT: CPT | Performed by: NURSE PRACTITIONER

## 2020-08-10 PROCEDURE — 36415 COLL VENOUS BLD VENIPUNCTURE: CPT | Performed by: NURSE PRACTITIONER

## 2020-08-10 PROCEDURE — 80050 GENERAL HEALTH PANEL: CPT | Performed by: NURSE PRACTITIONER

## 2020-08-11 LAB
ALBUMIN SERPL-MCNC: 3.5 G/DL (ref 3.4–5)
ALP SERPL-CCNC: 54 U/L (ref 40–150)
ALT SERPL W P-5'-P-CCNC: 15 U/L (ref 0–50)
ANION GAP SERPL CALCULATED.3IONS-SCNC: 9 MMOL/L (ref 3–14)
AST SERPL W P-5'-P-CCNC: 12 U/L (ref 0–45)
BILIRUB SERPL-MCNC: 0.7 MG/DL (ref 0.2–1.3)
BUN SERPL-MCNC: 9 MG/DL (ref 7–30)
CALCIUM SERPL-MCNC: 9.2 MG/DL (ref 8.5–10.1)
CHLORIDE SERPL-SCNC: 107 MMOL/L (ref 94–109)
CO2 SERPL-SCNC: 19 MMOL/L (ref 20–32)
CREAT SERPL-MCNC: 0.44 MG/DL (ref 0.52–1.04)
GFR SERPL CREATININE-BSD FRML MDRD: >90 ML/MIN/{1.73_M2}
GLUCOSE SERPL-MCNC: 93 MG/DL (ref 70–99)
POTASSIUM SERPL-SCNC: 3.9 MMOL/L (ref 3.4–5.3)
PROT SERPL-MCNC: 7.9 G/DL (ref 6.8–8.8)
SODIUM SERPL-SCNC: 135 MMOL/L (ref 133–144)
TSH SERPL DL<=0.005 MIU/L-ACNC: 0.65 MU/L (ref 0.4–4)

## 2020-11-24 NOTE — PROGRESS NOTES
[facility], [place]   Psychiatric Progress Note  Hospital Day: 14        Interim History:   The patient's care was discussed with the treatment team during the daily team meeting and/or staff's chart notes were reviewed.  Patient became agitated yesterday. She was slamming on the window of nurses station and threatened to kill staff. She required restraints.    Upon interview, the patient indicated desire to leave. I reminded her that her behaviors were going to make it unlikely that we will find placement.    Shortly after I met with her, she began to scream and yell in her room. She also was banging her head against the wall very aggressively. She needed restraints again.    Psychiatric Symptoms: paranoia, aggressive behaviors, head-banging.    Medication side effects reported: None    Other issues reported by patient: None         Medications:       haloperidol  5 mg Oral BID    Or     haloperidol lactate  5 mg Intramuscular BID          Allergies:   No Known Allergies       Labs:     No results found for this or any previous visit (from the past 48 hour(s)).       Psychiatric Examination:     /63   Pulse 89   Temp 97.8  F (36.6  C) (Tympanic)   Resp 16   Wt 92.3 kg (203 lb 8 oz)   LMP 10/31/2018 (Approximate)   SpO2 96%   BMI 32.85 kg/m    Weight is 203 lbs 8 oz  Body mass index is 32.85 kg/m .      Orthostatic Vitals       Most Recent      Sitting Orthostatic /56 12/12 0929    Sitting Orthostatic Pulse (bpm) 93 12/12 0929    Standing Orthostatic BP 93/57 12/12 0929    Standing Orthostatic Pulse (bpm) 112 12/12 0929            Appearance: awake, alert, adequately groomed and dressed in hospital scrubs  Attitude:  cooperative  Eye Contact:  intense  Mood:  anxious  Affect:  guarded  Speech:  clear, coherent and normal prosody  Language: fluent and intact in English  Psychomotor, Gait, Musculoskeletal:  no evidence of tardive dyskinesia, dystonia, or tics  Throught  HEATHER HANSEN  67y, Female  Allergy: iodine (Unknown)  strawberry (Unknown)      LOS  5d    CHIEF COMPLAINT: Sepsis, L pyelonephritis (23 Nov 2020 22:09)      INTERVAL EVENTS/HPI  - No acute events overnight  - T(F): , Max: 98.4 (11-23-20 @ 16:00)  - Tolerating medication  - WBC Count: 5.40 (11-24-20 @ 04:55)  WBC Count: 5.61 (11-23-20 @ 03:50)  - Creatinine, Serum: 0.6 (11-24-20 @ 04:55)  Creatinine, Serum: 0.6 (11-23-20 @ 17:18)       ROS  ***    VITALS:  T(F): 97, Max: 98.4 (11-23-20 @ 16:00)  HR: 92  BP: 127/79  RR: 13Vital Signs Last 24 Hrs  T(C): 36.1 (24 Nov 2020 04:00), Max: 36.9 (23 Nov 2020 16:00)  T(F): 97 (24 Nov 2020 04:00), Max: 98.4 (23 Nov 2020 16:00)  HR: 92 (24 Nov 2020 07:00) (76 - 106)  BP: 127/79 (24 Nov 2020 07:00) (69/42 - 141/91)  BP(mean): 97 (24 Nov 2020 07:00) (49 - 119)  RR: 13 (24 Nov 2020 07:00) (11 - 36)  SpO2: 99% (24 Nov 2020 07:00) (96% - 100%)    PHYSICAL EXAM:  ***    FH: Non-contributory  Social Hx: Non-contributory    TESTS & MEASUREMENTS:                        8.7    5.40  )-----------( 111      ( 24 Nov 2020 04:55 )             27.7     11-24    134<L>  |  105  |  11  ----------------------------<  108<H>  3.7   |  21  |  0.6<L>    Ca    10.3<H>      24 Nov 2020 04:55  Mg     1.7     11-24    TPro  4.4<L>  /  Alb  1.7<L>  /  TBili  0.4  /  DBili  x   /  AST  128<H>  /  ALT  90<H>  /  AlkPhos  563<H>  11-24    eGFR if Non African American: 94 mL/min/1.73M2 (11-24-20 @ 04:55)  eGFR if African American: 109 mL/min/1.73M2 (11-24-20 @ 04:55)  eGFR if Non African American: 94 mL/min/1.73M2 (11-23-20 @ 17:18)  eGFR if : 109 mL/min/1.73M2 (11-23-20 @ 17:18)    LIVER FUNCTIONS - ( 24 Nov 2020 04:55 )  Alb: 1.7 g/dL / Pro: 4.4 g/dL / ALK PHOS: 563 U/L / ALT: 90 U/L / AST: 128 U/L / GGT: x               Culture - Urine (collected 11-19-20 @ 19:00)  Source: Kidney None  Final Report (11-22-20 @ 21:42):    Few Pseudomonas aeruginosa (Carbapenem Resistant)  Organism: Pseudomonas aeruginosa (Carbapenem Resistant) (11-22-20 @ 21:42)  Organism: Pseudomonas aeruginosa (Carbapenem Resistant) (11-22-20 @ 21:42)      -  Amikacin: S <=16      -  Aztreonam: R >16      -  Cefepime: R >16      -  Ceftazidime: I 16      -  Ciprofloxacin: R >2      -  Gentamicin: I 8      -  Imipenem: R >8      -  Levofloxacin: R >4      -  Meropenem: R >8      -  Piperacillin/Tazobactam: I 32      -  Tobramycin: S <=2      Method Type: NICOLE    Culture - Urine (collected 11-19-20 @ 14:27)  Source: .Urine Clean Catch (Midstream)  Preliminary Report (11-23-20 @ 12:11):    >100,000 CFU/ml Pseudomonas aeruginosa    Culture - Blood (collected 11-19-20 @ 10:30)  Source: .Blood Blood-Peripheral  Preliminary Report (11-20-20 @ 23:01):    No growth to date.    Culture - Blood (collected 11-19-20 @ 10:30)  Source: .Blood Blood-Peripheral  Preliminary Report (11-20-20 @ 23:01):    No growth to date.        Lactate, Blood: 2.0 mmol/L (11-20-20 @ 05:30)  Lactate, Blood: 2.7 mmol/L (11-20-20 @ 02:20)  Lactate, Blood: 3.4 mmol/L (11-19-20 @ 21:25)  Blood Gas Venous - Lactate: 2.7 mmoL/L (11-19-20 @ 10:41)      INFECTIOUS DISEASES TESTING  COVID-19 PCR: NotDetec (11-19-20 @ 11:26)  COVID-19 PCR: NotDetec (09-28-20 @ 13:55)  COVID-19 PCR: NotDetec (09-27-20 @ 14:58)  Procalcitonin, Serum: 0.18 (09-23-20 @ 11:10)  COVID-19 PCR: NotDetec (09-22-20 @ 14:14)  COVID-19 PCR: NotDetec (09-16-20 @ 17:55)  HIV-1/2 Combo Result: Nonreact (09-15-20 @ 11:53)  COVID-19 PCR: NotDetec (09-11-20 @ 11:20)  COVID-19 PCR: NotDetec (09-08-20 @ 12:16)  COVID-19 PCR: NotDetec (07-06-20 @ 11:00)  COVID-19 PCR: NotDetec (07-03-20 @ 13:32)  COVID-19 PCR: NotDetec (06-25-20 @ 17:31)  COVID-19 PCR: NotDetec (06-21-20 @ 21:26)      INFLAMMATORY MARKERS  Sedimentation Rate, Erythrocyte: 41 mm/Hr (09-09-20 @ 09:42)  C-Reactive Protein, Serum: 4.65 mg/dL (09-09-20 @ 09:42)      RADIOLOGY & ADDITIONAL TESTS:  I have personally reviewed the last available Chest xray  CXR  Xray Chest 1 View- PORTABLE-Urgent:   EXAM:  XR CHEST PORTABLE URGENT 1V            PROCEDURE DATE:  11/22/2020            INTERPRETATION:  Clinical History / Reason for exam: Dyspnea. Nasogastric tube placement    Comparison : Chest radiograph 11/21/2020 performed at 11:21 PM.    Technique/Positioning: Frontal.    Findings:    Support devices: Central venous line projecting over the superior vena cava. Enteric support tube is with its tip projecting over the left upper quadrant, outside of the image    Cardiac/mediastinum/hilum: Indeterminate.    Lung parenchyma/Pleura: Left lower lobe opacity/pleural effusion without change.    Skeleton/soft tissues: Unchanged    Impression:    Left lower lobe opacity/pleural effusion without change.    Support catheters as above                JAMIE LAIRD MD; Attending Radiologist  This document has been electronically signed. Nov 22 2020  8:37PM (11-22-20 @ 20:30)      CT      CARDIOLOGY TESTING  12 Lead ECG:   Ventricular Rate 111 BPM    Atrial Rate 111 BPM    P-R Interval 158 ms    QRS Duration 90 ms    Q-T Interval 330 ms    QTC Calculation(Bazett) 448 ms    P Axis 48 degrees    R Axis -14 degrees    T Axis 31 degrees    Diagnosis Line Sinus tachycardia  Low voltage QRS  Inferior infarct , age undetermined  Abnormal ECG    Confirmed by MATTHEW GUNTER MD ([id number]) on 11/23/2020 1:20:29 PM (11-21-20 @ 09:01)  12 Lead ECG:   Ventricular Rate 141 BPM    Atrial Rate 141 BPM    P-R Interval 128 ms    QRS Duration 68 ms    Q-T Interval 264 ms    QTC Calculation(Bazett) 404 ms    P Axis 73 degrees    R Axis 49 degrees    T Axis 76 degrees    Diagnosis Line Sinus tachycardia  Low voltage QRS  Cannot rule out Anteroseptal infarct , age undetermined  Abnormal ECG    Confirmed by MATTHEW GUNTER MD ([id number]) on 11/23/2020 1:20:28 PM (11-19-20 @ 20:41)      MEDICATIONS  chlorhexidine 4% Liquid 1 Topical <User Schedule>  heparin   Injectable 5000 SubCutaneous every 12 hours  hydrocortisone sodium succinate Injectable 100 IV Push every 12 hours  meropenem  IVPB 2000 IV Intermittent every 8 hours  metoprolol tartrate 25 Oral two times a day  norepinephrine Infusion 0.05 IV Continuous <Continuous>  pantoprazole   Suspension 40 Oral daily  polymyxin B IVPB [id number] IV Intermittent every 12 hours  sodium bicarbonate 650 Oral every 8 hours  sodium chloride 1 Oral two times a day      WEIGHT  Weight (kg): 79.4 (11-19-20 @ 20:00)  Creatinine, Serum: 0.6 mg/dL (11-24-20 @ 04:55)  Creatinine, Serum: 0.6 mg/dL (11-23-20 @ 17:18)      ANTIBIOTICS:  meropenem  IVPB 2000 milliGRAM(s) IV Intermittent every 8 hours  polymyxin B IVPB [id number] Unit(s) IV Intermittent every 12 hours      All available historical records have been reviewed       Process:  illogical  Associations:  no loose associations  Thought Content:  paranoid. Still believes the illuminati is after her. She believes we are mistreating her because of her nationality  Insight:  limited  Judgement:  limited  Oriented to:  time, person, and place  Attention Span and Concentration:  intact  Recent and Remote Memory:  fair  Fund of Knowledge:  adequate    Clinical Global Impressions  First:  Considering your total clinical experience with this particular patient population, how severe are the patient's symptoms at this time?: 7 (11/28/18 1001)  Compared to the patient's condition at the START of treatment, this patient's condition is:: 4 (11/28/18 1001)  Most recent:  Considering your total clinical experience with this particular patient population, how severe are the patient's symptoms at this time?: 7 (12/07/18 1155)  Compared to the patient's condition at the START of treatment, this patient's condition is:: 3 (12/07/18 1155)           Precautions:     Behavioral Orders   Procedures     Assault precautions     Code 1 - Restrict to Unit     Routine Programming     As clinically indicated     Status 15     Every 15 minutes.     Suicide precautions     Patients on Suicide Precautions should have a Combination Diet ordered that includes a Diet selection(s) AND a Behavioral Tray selection for Safe Tray - with utensils, or Safe Tray - NO utensils            Diagnoses:      1.  Schizoaffective disorder, bipolar type. (F25.0)  2.  Provisional diagnosis of stimulant use disorder, cocaine type.  (F14.10)  3.  Likely cannabis use disorder, moderate to severe. (F12.20)  4.  Cigarette nicotine dependence, currently withdrawal. (F17.213)         Assessment & Plan:   Assessment and hospital summary:  28-year-old woman admitted after she called 911 due to belief she was being followed. This is a common theme for her. Initially she was quite paranoid here. Since arrival she has mostly kept to herself, but  has had some aggressive behaviors related to her paranoia. In addition, she snuck a phone onto the unit, which has not been put in her locker. She initially agreed to a trial of clozapine, but refused blood draws needed to start this, so olanzapine was continued instead. She appeared to be doing better, but has had occasional outbursts where she will spit or otherwise go after staff or other patients. Following a seclusion on 12/7 she tried to tie her bra around her neck. She has remained paranoid since then and has been escalating the last few days leading to several episodes of restraints.    Target psychiatric symptoms and interventions:  Psychosis  -discontinue Zyprexa due to lack of efficacy  -Start haloperidol 5 mg BID with IM backup as an Emergency Medication. The patient is at serious risk for harm to self or other due to her psychosis and haloperidol is not currently on her [facility] order.    Medical Problems and Treatments:  None acute    Behavioral/Psychological/Social:  Encourage unit programming    Because of behavior leading to a seclusion or restraint episode on 12/11 and 12/12, we have made the following change to the treatment plan: started emergency medications and changed neuroleptics      Disposition Plan   Reason for ongoing admission: poses an imminent risk to self, poses an imminent risk to others and is unable to care for self due to severe psychosis or jacqueline  Discharge location: [facility] facility  Discharge Medications: not ordered  Follow-up Appointments: not scheduled  Legal Status: full commitment with [facility] for clozapine, olanzapine, aripiprazole, lurasidone. Patient has hearing to amend [facility] on 12/17 (started by outpatient provider) I have redone the note to request haloperidol, risperidone, chlorpromazine, and clozapine.  Entered by: Cy Sumner on 12/12/2018 at 6:12 PM           HEATHER HANSEN  67y, Female  Allergy: iodine (Unknown)  strawberry (Unknown)      LOS  5d    CHIEF COMPLAINT: Sepsis, L pyelonephritis (23 Nov 2020 22:09)      INTERVAL EVENTS/HPI  - No acute events overnight  - T(F): , Max: 98.4 (11-23-20 @ 16:00)  - Tolerating medication  - WBC Count: 5.40 (11-24-20 @ 04:55)  WBC Count: 5.61 (11-23-20 @ 03:50)  - Creatinine, Serum: 0.6 (11-24-20 @ 04:55)  Creatinine, Serum: 0.6 (11-23-20 @ 17:18)       ROS  General: Denies rigors, nightsweats  HEENT: Denies headache, rhinorrhea, sore throat, eye pain  CV: Denies CP, palpitations  PULM: Denies wheezing, hemoptysis  GI: Denies hematemesis, hematochezia, melena  : Denies discharge, hematuria  MSK: Denies arthralgias, myalgias  SKIN: Denies rash, lesions  NEURO: Denies paresthesias, weakness  PSYCH: Denies depression, anxiety     VITALS:  T(F): 97, Max: 98.4 (11-23-20 @ 16:00)  HR: 92  BP: 127/79  RR: 13Vital Signs Last 24 Hrs  T(C): 36.1 (24 Nov 2020 04:00), Max: 36.9 (23 Nov 2020 16:00)  T(F): 97 (24 Nov 2020 04:00), Max: 98.4 (23 Nov 2020 16:00)  HR: 92 (24 Nov 2020 07:00) (76 - 106)  BP: 127/79 (24 Nov 2020 07:00) (69/42 - 141/91)  BP(mean): 97 (24 Nov 2020 07:00) (49 - 119)  RR: 13 (24 Nov 2020 07:00) (11 - 36)  SpO2: 99% (24 Nov 2020 07:00) (96% - 100%)    PHYSICAL EXAM:  Gen: NAD, resting in bed  HEENT: Normocephalic, atraumatic  Neck: supple, no lymphadenopathy  CV: Regular rate & regular rhythm  Lungs: decreased BS at bases, no fremitus  Abdomen: Soft, BS present  Ext: Warm, well perfused  Neuro: non focal, awake  Skin: no rash, no erythema  Lines: no phlebitis     FH: Non-contributory  Social Hx: Non-contributory    TESTS & MEASUREMENTS:                        8.7    5.40  )-----------( 111      ( 24 Nov 2020 04:55 )             27.7     11-24    134<L>  |  105  |  11  ----------------------------<  108<H>  3.7   |  21  |  0.6<L>    Ca    10.3<H>      24 Nov 2020 04:55  Mg     1.7     11-24    TPro  4.4<L>  /  Alb  1.7<L>  /  TBili  0.4  /  DBili  x   /  AST  128<H>  /  ALT  90<H>  /  AlkPhos  563<H>  11-24    eGFR if Non African American: 94 mL/min/1.73M2 (11-24-20 @ 04:55)  eGFR if African American: 109 mL/min/1.73M2 (11-24-20 @ 04:55)  eGFR if Non African American: 94 mL/min/1.73M2 (11-23-20 @ 17:18)  eGFR if : 109 mL/min/1.73M2 (11-23-20 @ 17:18)    LIVER FUNCTIONS - ( 24 Nov 2020 04:55 )  Alb: 1.7 g/dL / Pro: 4.4 g/dL / ALK PHOS: 563 U/L / ALT: 90 U/L / AST: 128 U/L / GGT: x               Culture - Urine (collected 11-19-20 @ 19:00)  Source: Kidney None  Final Report (11-22-20 @ 21:42):    Few Pseudomonas aeruginosa (Carbapenem Resistant)  Organism: Pseudomonas aeruginosa (Carbapenem Resistant) (11-22-20 @ 21:42)  Organism: Pseudomonas aeruginosa (Carbapenem Resistant) (11-22-20 @ 21:42)      -  Amikacin: S <=16      -  Aztreonam: R >16      -  Cefepime: R >16      -  Ceftazidime: I 16      -  Ciprofloxacin: R >2      -  Gentamicin: I 8      -  Imipenem: R >8      -  Levofloxacin: R >4      -  Meropenem: R >8      -  Piperacillin/Tazobactam: I 32      -  Tobramycin: S <=2      Method Type: NICOLE    Culture - Urine (collected 11-19-20 @ 14:27)  Source: .Urine Clean Catch (Midstream)  Preliminary Report (11-23-20 @ 12:11):    >100,000 CFU/ml Pseudomonas aeruginosa    Culture - Blood (collected 11-19-20 @ 10:30)  Source: .Blood Blood-Peripheral  Preliminary Report (11-20-20 @ 23:01):    No growth to date.    Culture - Blood (collected 11-19-20 @ 10:30)  Source: .Blood Blood-Peripheral  Preliminary Report (11-20-20 @ 23:01):    No growth to date.        Lactate, Blood: 2.0 mmol/L (11-20-20 @ 05:30)  Lactate, Blood: 2.7 mmol/L (11-20-20 @ 02:20)  Lactate, Blood: 3.4 mmol/L (11-19-20 @ 21:25)  Blood Gas Venous - Lactate: 2.7 mmoL/L (11-19-20 @ 10:41)      INFECTIOUS DISEASES TESTING  COVID-19 PCR: NotDetec (11-19-20 @ 11:26)  COVID-19 PCR: NotDetec (09-28-20 @ 13:55)  COVID-19 PCR: NotDetec (09-27-20 @ 14:58)  Procalcitonin, Serum: 0.18 (09-23-20 @ 11:10)  COVID-19 PCR: NotDetec (09-22-20 @ 14:14)  COVID-19 PCR: NotDetec (09-16-20 @ 17:55)  HIV-1/2 Combo Result: Nonreact (09-15-20 @ 11:53)  COVID-19 PCR: NotDetec (09-11-20 @ 11:20)  COVID-19 PCR: NotDetec (09-08-20 @ 12:16)  COVID-19 PCR: NotDetec (07-06-20 @ 11:00)  COVID-19 PCR: NotDetec (07-03-20 @ 13:32)  COVID-19 PCR: NotDetec (06-25-20 @ 17:31)  COVID-19 PCR: NotDetec (06-21-20 @ 21:26)      INFLAMMATORY MARKERS  Sedimentation Rate, Erythrocyte: 41 mm/Hr (09-09-20 @ 09:42)  C-Reactive Protein, Serum: 4.65 mg/dL (09-09-20 @ 09:42)      RADIOLOGY & ADDITIONAL TESTS:  I have personally reviewed the last available Chest xray  CXR  Xray Chest 1 View- PORTABLE-Urgent:   EXAM:  XR CHEST PORTABLE URGENT 1V            PROCEDURE DATE:  11/22/2020            INTERPRETATION:  Clinical History / Reason for exam: Dyspnea. Nasogastric tube placement    Comparison : Chest radiograph 11/21/2020 performed at 11:21 PM.    Technique/Positioning: Frontal.    Findings:    Support devices: Central venous line projecting over the superior vena cava. Enteric support tube is with its tip projecting over the left upper quadrant, outside of the image    Cardiac/mediastinum/hilum: Indeterminate.    Lung parenchyma/Pleura: Left lower lobe opacity/pleural effusion without change.    Skeleton/soft tissues: Unchanged    Impression:    Left lower lobe opacity/pleural effusion without change.    Support catheters as above                JAMIE LAIRD MD; Attending Radiologist  This document has been electronically signed. Nov 22 2020  8:37PM (11-22-20 @ 20:30)      CT      CARDIOLOGY TESTING  12 Lead ECG:   Ventricular Rate 111 BPM    Atrial Rate 111 BPM    P-R Interval 158 ms    QRS Duration 90 ms    Q-T Interval 330 ms    QTC Calculation(Bazett) 448 ms    P Axis 48 degrees    R Axis -14 degrees    T Axis 31 degrees    Diagnosis Line Sinus tachycardia  Low voltage QRS  Inferior infarct , age undetermined  Abnormal ECG    Confirmed by MATTHEW GUNTER MD ([id number]) on 11/23/2020 1:20:29 PM (11-21-20 @ 09:01)  12 Lead ECG:   Ventricular Rate 141 BPM    Atrial Rate 141 BPM    P-R Interval 128 ms    QRS Duration 68 ms    Q-T Interval 264 ms    QTC Calculation(Bazett) 404 ms    P Axis 73 degrees    R Axis 49 degrees    T Axis 76 degrees    Diagnosis Line Sinus tachycardia  Low voltage QRS  Cannot rule out Anteroseptal infarct , age undetermined  Abnormal ECG    Confirmed by MATTHEW GUNTER MD ([id number]) on 11/23/2020 1:20:28 PM (11-19-20 @ 20:41)      MEDICATIONS  chlorhexidine 4% Liquid 1 Topical <User Schedule>  heparin   Injectable 5000 SubCutaneous every 12 hours  hydrocortisone sodium succinate Injectable 100 IV Push every 12 hours  meropenem  IVPB 2000 IV Intermittent every 8 hours  metoprolol tartrate 25 Oral two times a day  norepinephrine Infusion 0.05 IV Continuous <Continuous>  pantoprazole   Suspension 40 Oral daily  polymyxin B IVPB [id number] IV Intermittent every 12 hours  sodium bicarbonate 650 Oral every 8 hours  sodium chloride 1 Oral two times a day      WEIGHT  Weight (kg): 79.4 (11-19-20 @ 20:00)  Creatinine, Serum: 0.6 mg/dL (11-24-20 @ 04:55)  Creatinine, Serum: 0.6 mg/dL (11-23-20 @ 17:18)      ANTIBIOTICS:  meropenem  IVPB 2000 milliGRAM(s) IV Intermittent every 8 hours  polymyxin B IVPB [id number] Unit(s) IV Intermittent every 12 hours      All available historical records have been reviewed

## 2021-01-30 ENCOUNTER — HOSPITAL ENCOUNTER (EMERGENCY)
Facility: CLINIC | Age: 31
Discharge: HOME | End: 2021-01-30
Attending: EMERGENCY MEDICINE | Admitting: EMERGENCY MEDICINE
Payer: COMMERCIAL

## 2021-01-30 VITALS
HEART RATE: 110 BPM | SYSTOLIC BLOOD PRESSURE: 119 MMHG | OXYGEN SATURATION: 96 % | TEMPERATURE: 97.3 F | RESPIRATION RATE: 18 BRPM | DIASTOLIC BLOOD PRESSURE: 80 MMHG

## 2021-01-30 DIAGNOSIS — F25.9 SCHIZOAFFECTIVE DISORDER, UNSPECIFIED TYPE (H): ICD-10-CM

## 2021-01-30 PROCEDURE — 99283 EMERGENCY DEPT VISIT LOW MDM: CPT | Performed by: EMERGENCY MEDICINE

## 2021-01-30 NOTE — DISCHARGE INSTRUCTIONS
Please make an appointment to follow up with your therapist and/or Psychiatric Clinic (phone: [phone]) within 1-3 days.     Return to the ED if you are having any urgent/life-threatening concerns.     DISCHARGE RESOURCES:  -[facility] [phone]   -[facility] [phone] or [phone].  -Crisis Intervention: [phone] or [phone] (TTY: [phone]).  Call anytime.  -Suicide Awareness Voices of Education (SAVE) (www.save.org): 931-446-GNNB (8516)  -National Suicide Prevention Line (www.mentalhealthmn.org): 548-749-RYHX (7248)  -National [employer] Mental Illness (www.[place].jose.org): 693.344.2236 or 125-066-5995.  -Hdvl0kzfl: text the word LIFE to 54063 for immediate support and crisis intervention  -Mental Health Consumer/Survivor Network of [place] (www.mhcsn.net): 157.506.5954 or 320-335-2584  -[employer] [place] (www.mentalhealth.org): 885.192.1060 or 838-499-3074

## 2021-01-30 NOTE — ED PROVIDER NOTES
"  History     Chief Complaint   Patient presents with     Mental Health Problem     HPI  Ilene Liu is a 31 year old female with PMH notable for schizoaffective disorder, pseudoseizure who presents to the ED with request for mental health assessment. She states that she was told she should come in for an assessment because she had some anger issues earlier. She states that she is on parole and does not want to have issues with it, so she is here for the evaluation. No SI, no HI, no hallucinations.       Past Medical History  Past Medical History:   Diagnosis Date     Asthma      Cocaine abuse      Depressive disorder      Marijuana use      Schizo-affective type schizophrenia      Past Surgical History:   Procedure Laterality Date     ESOPHAGOSCOPY, GASTROSCOPY, DUODENOSCOPY (EGD), COMBINED N/A 12/17/2018    Procedure: COMBINED ESOPHAGOSCOPY, GASTROSCOPY, DUODENOSCOPY (EGD);  Surgeon: Cassie Rahman MD;  Location: [facility]          albuterol (PROAIR HFA/PROVENTIL HFA/VENTOLIN HFA) 108 (90 Base) MCG/ACT inhaler       benztropine (COGENTIN) 0.5 MG tablet       haloperidol (HALDOL) 10 MG tablet       haloperidol decanoate (HALDOL DECANOATE) 100 MG/ML injection       LORazepam (ATIVAN) 1 MG tablet       nicotine (NICODERM CQ) 14 MG/24HR 24 hr patch       nicotine (NICORETTE) 2 MG gum       polyethylene glycol (MIRALAX/GLYCOLAX) packet      No Known Allergies  Family History  No family history on file.  Social History   Social History     Tobacco Use     Smoking status: Current Some Day Smoker     Packs/day: 0.25     Smokeless tobacco: Never Used   Substance Use Topics     Alcohol use: Yes     Comment: not currently drinking anything     Drug use: Never     Types: Marijuana, \"Crack\" cocaine      Past medical history, past surgical history, medications, allergies, family history, and social history were reviewed with the patient. No additional pertinent items.      Review of Systems  A complete review of systems was " performed with pertinent positives and negatives noted in the HPI, and all other systems negative.     Physical Exam   BP: 119/80  Pulse: 110  Temp: 97.3  F (36.3  C)  Resp: 18  SpO2: 96 %    Physical Exam  General: No acute distress. Appears stated age.   HENT: MMM, no oropharyngeal lesions  Eyes: PERRL, normal sclerae   Cardio: Regular rate, extremities well perfused  Resp: Normal work of breathing, normal respiratory rate  Neuro: alert and fully oriented. CN II-XII grossly intact. Grossly normal strength and sensation in all extremities.   MSK: no deformities.   Integumentary/Skin: no rash visualized, normal color  Psych: normal affect, calm behavior. no SI. no HI. no hallucinations. Thought process linear. Insight fair to good.     ED Course      Procedures        Critical Care time:  none       Labs Ordered and Resulted from Time of ED Arrival Up to the Time of Departure from the ED - No data to display  No orders to display          Assessments & Plan (with Medical Decision Making)   Patient presenting for mental health evaluation after reported anger issue earlier in the day. Vitals in the ED unremarkable. Nursing notes reviewed.     DEC assessment planned, and the patient had a prolonged wait for  availability. She ended up wanting to leave due to how long the wait was. Patient without evidence of active psychosis, does not show signs of being a significant threat to herself nor others. Patient states she plans to get an assessment elsewhere. No indication for hold currently.     After counseling on the diagnosis, work-up, and treatment plan, the patient was discharged to home. The patient was advised to follow-up with her psychiatrist or therapist as soon as she is able. The patient was advised to return to the ED if worsening symptoms, or if there are any urgent/life-threatening concerns.     Final diagnoses:   Schizoaffective disorder, unspecified type (H)     Discharge Medication List as of  1/30/2021  2:36 AM          --  Shiv Major MD   Emergency Medicine   [facility] EMERGENCY DEPARTMENT  1/30/2021     Shiv Major MD  01/30/21 0424

## 2021-05-29 ENCOUNTER — TELEPHONE (OUTPATIENT)
Dept: BEHAVIORAL HEALTH | Facility: CLINIC | Age: 31
End: 2021-05-29
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
## 2021-05-30 ENCOUNTER — HOSPITAL ENCOUNTER (EMERGENCY)
Facility: CLINIC | Age: 31
Discharge: HOME | End: 2021-05-30
Attending: EMERGENCY MEDICINE | Admitting: EMERGENCY MEDICINE
Payer: COMMERCIAL

## 2021-05-30 VITALS
OXYGEN SATURATION: 100 % | WEIGHT: 183 LBS | BODY MASS INDEX: 29.54 KG/M2 | HEART RATE: 86 BPM | RESPIRATION RATE: 14 BRPM | SYSTOLIC BLOOD PRESSURE: 108 MMHG | DIASTOLIC BLOOD PRESSURE: 79 MMHG | TEMPERATURE: 97.6 F

## 2021-05-30 DIAGNOSIS — R41.82 ALTERED MENTAL STATUS, UNSPECIFIED ALTERED MENTAL STATUS TYPE: ICD-10-CM

## 2021-05-30 PROCEDURE — 99285 EMERGENCY DEPT VISIT HI MDM: CPT | Performed by: EMERGENCY MEDICINE

## 2021-05-30 PROCEDURE — 99284 EMERGENCY DEPT VISIT MOD MDM: CPT | Performed by: EMERGENCY MEDICINE

## 2021-05-30 NOTE — ED PROVIDER NOTES
History     Chief Complaint   Patient presents with     Alcohol Problem     Pt found down by [employer], Pt not responding to PD, sent here for eval.     HPI  Ilene Liu is a 31 year old female who presents with altered mental status. There is history of alcohol and/or drug use. History limited due to altered mental status. EMS reports the patient was found unconscious in the grass on campus. She would not get up when police told her that she needed to leave the area, so EMS was called, she slept en route, endorsed EtOH, denied drugs. EMS noted needles in the patient's vicinity when they came on scene. Patient denies injuries.     Past Medical and Surgical History, Medications, Allergies, and Social History were reviewed in the electronic medical record. Review with the patient was attempted but limited due to altered mental status.      Review of Systems  A complete review of systems was attempted but limited by altered mental status.     Physical Exam   BP: 112/81  Pulse: 91  Temp: 97.6  F (36.4  C)  Resp: 20  Weight: 83 kg (183 lb)  SpO2: 99 %      Physical Exam  General: smells of EtOH, no acute distress  HENT: MMM, no oropharyngeal lesions. Atraumatic head.   Eyes: PERRL, normal sclerae, nystagmus present  Neck: non-tender, supple  Cardio: Regular rate. Regular rhythm. Extremities well perfused  Resp: Normal work of breathing, normal respiratory rate  Abdomen: no tenderness, non-distended, no rebound, no guarding  Neuro: alert, slow to respond. Slurred speech. Confused. CN II-XII grossly intact. Grossly normal strength and sensation.   MSK: no deformities. Grossly normal ROM in extremities.   Integumentary/Skin: no rash visualized, normal color    ED Course      Procedures           Labs Ordered and Resulted from Time of ED Arrival Up to the Time of Departure from the ED - No data to display  No orders to display          Assessments & Plan (with Medical Decision Making)   Patient arriving with altered mental  status, with reason to suspect alcohol or other drug intoxication as etiology. Exam without findings suggestive of trauma, non-focal. Breath EtOH 0. Glucose normal per EMS report. Nursing notes reviewed.     AMS likely due to substance intoxication delirium but cannot immediately rule out other dangerous etiologies of AMS. Plan close clinical monitoring of the patient and her mental status for clearing of intoxicating substance. With approriate clearing, the patient would likely be able to be discharged. If not appropriately clearing, plan broadening of work-up, potentially including CT head and serum labs.     During my care, the patient did not require medications for agitation, and did not require restraints/seclusion for patient and/or provider safety.     With monitoring, patient's mental status cleared. Patient then clinically sober with steady gait and tolerating PO. Normalization of mental status with sobering makes other causes of altered mental status very unlikely. After counseling on the diagnosis, work-up, and treatment plan, the patient was discharged. Recommended safe cessation of intoxicating substances and provided information on community treatment resources. Patient to follow-up with primary care in the coming days for recheck and further cessation counseling. Patient to return to the ED if any urgent/life-threatening concerns.     Final diagnoses:   Altered mental status, unspecified altered mental status type     New Prescriptions    No medications on file       --  Shiv Major MD   Emergency Medicine   [facility] EMERGENCY DEPARTMENT  5/30/2021     Shiv Major MD  05/30/21 8811

## 2021-05-30 NOTE — ED NOTES
"Pt allowed discharge vitals.  Pt told she was being discharged and her belongings were brought into her room.  Pt refused to sign discharge papers and stated, \"I am not leaving.\"  Pt then wrapped herself up in her blankets.  Security called to insure Pt leaves.  "

## 2021-05-30 NOTE — ED NOTES
Bed: ED08  Expected date: 5/30/21  Expected time:   Means of arrival:   Comments:  Hen [address] 32 y/o female ETOH  Found down outside

## 2021-05-30 NOTE — ED NOTES
"Dr. Major informed pt refused breathalyzer.    This writer informed pt we need this information to help provide care. Pt ignored this writer, pt informed to let this writer know when ready to cooperate pt \"ok thank you can you put my bed down.\" Pt choosing to cooperate to certain things. Provider made aware. Will reassess.   "
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
## 2021-05-30 NOTE — DISCHARGE INSTRUCTIONS
Please use the below resources and your primary care physician to safely cease alcohol and/or substance use.     Return to the ED if you are having any urgent/life-threatening concerns.     DISCHARGE RESOURCES:  -[facility]: [phone] (detox), [phone] (outpatient & [facility])  -[facility] ([address]): [phone]  -[facility]: [phone]  -[facility]: [phone]  -[facility] Recovery - self management for addiction recovery:  www.smartrecovery.org    -[facility] ~ [facility] Support [facility]: 468.744.9619.  -[facility] [phone]   -Substance Abuse and Mental Health Services (www.samhsa.gov)  -Harm Reduction Coalition (www. Harmreduction.org)  -[place] Opioid Prevention Coalition: www.opioidcoalition.org  -Poison control 1-973.828.1514       Sober Support Group Information:  AA/NA & Sponsor/Support  -Alcoholics Anonymous (www.alcoholics-anonymous.org): for local information 24 hours/day  -AA Intergroup service office in [place] (http://www.aastpaul.org/) [phone]  -AA Intergroup service office in [facility]: [phone]. (http://www.a[place].org/)  -Narcotics Anonymous (www.nam[place].org) (805) 248-3358   -Sober Fun Activities: www.sober-activities.SpinVox.Glamour.com.ng/[facility]//[facility] Recovery Connection ([facility])  [facility] connects people seeking recovery to resources that help foster and sustain long-term recovery.  Whether you are seeking resources for treatment, transportation, housing, job training, education, health care or other pathways to recovery, [facility] is a great place to start.   Phone: 760.113.9436. www.[place]Sentient.MadeClose (Great listing of all types of recovery and non-recovery related resources)

## 2021-05-30 NOTE — ED NOTES
Pt has been told that she is discharged and needs to leave. She replied that she is not leaving. Security told her that she needs to leave. She was told that the police had been called. The pt replied that she did not care

## 2021-05-30 NOTE — ED NOTES
Police have been here. Pt refused to open eyes or leave with them. Police stated that they cannot physically remove her per their policy. Charge nurse has notified the ANS.  Security and MD are aware. Pt was moved to room 10 while on the cart. She did not move and is still refusing to leave

## 2021-05-30 NOTE — ED NOTES
Spoke to patient and she claims that she needs a cab ride to go home, patient gave her apt. Address : [address] [place] [address]. Cab ride called and was set-up for patient. Informed patient that her cab was here and she stood up and took and her belongings and discharged.

## 2021-05-30 NOTE — ED NOTES
Pt walked out into the hallway and looked around. She then stated that she was not leaving and returned to the cart. Security spoke to her. Pt is still refusing to leave

## 2021-06-07 NOTE — ED PROVIDER NOTES
"  History     Chief Complaint   Patient presents with     Drug Overdose     HPI  Ilene Liu is a 29 year old female with PMH notable for schizoaffective type schizophrenia who presents to the ED after overdose with suicidal intent. Patient goes between saying \"I want to get better\" and \"I just want to die\". She reports that at 1 am she took about 10-15 tablets of 10 mg olanzapine, 2-3 tablets of diphenhydramine, and then about 50 tablets of 0.5 mg benzotropine. She reports stressors of \"they are following me everywhere, even here\". Patient unable to express who \"they\" are. She endorses suicidal ideation, denies desire to harm others.     I have reviewed the Medications, Allergies, Past Medical and Surgical History, and Social History in the Epic system.    Review of Systems  A complete review of systems was performed with pertinent positives and negatives noted in the HPI, and all other systems negative.     Physical Exam   BP: 129/82  Pulse: 93  Heart Rate: 102  Temp: 98.7  F (37.1  C)  Resp: 18  Weight: 90.7 kg (200 lb)  SpO2: 99 %    Physical Exam  General: no acute distress. Appears stated age.   HENT: MMM, no oropharyngeal lesions  Eyes: PERRL, normal sclerae  Cardio: borderline tachycardic rate. Regular rhythm. Extremities well perfused  Resp: Normal work of breathing, clear breath sounds  Abdomen: no tenderness, non-distended, no rebound, no guarding  Neuro: alert and fully oriented. CN II-XII grossly intact. Grossly normal strength and sensation in all extremities.   MSK: no deformities.   Integumentary/Skin: no rash visualized, normal color  Psych: anxious affect, calm behavior in ED. Active SI. Denies SI. Hallucinations of \"people following me everywhere\".     ED Course      Procedures             EKG Interpretation:      Interpreted by Shiv Major  Time reviewed: 0128  Symptoms at time of EKG: overdose   Rhythm: normal sinus   Rate: normal  Axis: normal  Ectopy: none  Conduction: normal  ST " Segments/ T Waves: No ST-T wave changes  Q Waves: none  Comparison to prior: Unchanged from 3/1/2019    Clinical Impression: normal EKG, , Qtc 411, QRS 80       Critical Care Addendum    My initial assessment, based on my review of prehospital provider report and focused history, established that Ilene Liu has severe toxic ingestion, which requires immediate intervention, and therefore she is critically ill.     After the initial assessment, the care team initiated multiple lab tests and consulted with poison control to provide stabilization care. Due to the critical nature of this patient, I reassessed nursing observations, vital signs, physical exam, review of cardiac rhythm monitor, 12 lead ECG analysis, interpretation of lab results, mental status, neurologic status and respiratory status multiple times prior to her disposition.     Time also spent performing documentation, reviewing test results and coordination of care.     Critical care time (excluding teaching time and procedures): 40 minutes.         Labs Ordered and Resulted from Time of ED Arrival Up to the Time of Departure from the ED   COMPREHENSIVE METABOLIC PANEL - Abnormal; Notable for the following components:       Result Value    Glucose 112 (*)     All other components within normal limits   DRUG ABUSE SCREEN 6 CHEM DEP URINE ([facility]) - Abnormal; Notable for the following components:    Cannabinoids Qual Urine Positive (*)     All other components within normal limits   CBC WITH PLATELETS DIFFERENTIAL   ACETAMINOPHEN LEVEL   SALICYLATE LEVEL   HCG QUALITATIVE   PERIPHERAL IV CATHETER            Assessments & Plan (with Medical Decision Making)   Patient presenting after reported overdose of olanzapine, diphenhydramine, and benztropine with suicidal intent. Vitals in the ED with initial mild tachycardia. Patient asking about going home, is at high risk of harm to self - 72 hour hold placed at 0135.     Discussed case with poison  "control. Main risks of anticholinergic toxicity, CNS depression, delirium, tachycardia, QT prolongation, seizure. Olanzapine peak at 6 hours. Recommended 8 hours observation, recommended against charcoal, benzo if treatment needed for agitation.     ECG with normal intervals. Patient initially hesitant about blood draw, was eventually able to be convinced. APAP and salicylate negative. Anion gap normal. Pregnancy negative. Patient developed some leg restlessness and \"shooting\" feeling in the legs. Lorazepam given with improvement.      Patient signed out to behavioral health intake with plan for admission after 8 hours monitoring in the ED if remaining stable. Patient signed out to oncoming ED provider with plan for monitoring until 9 am (8 hours post-ingestion), then admission to behavioral health.       Final diagnoses:   Suicidal overdose, initial encounter (H)       --  Shiv Major MD  Emergency Medicine     I have reviewed the nursing notes.  I have reviewed the findings, diagnosis, plan and need for follow up with the patient.    5/15/2019   [facility], [place], EMERGENCY DEPARTMENT     Shiv Major MD  05/15/19 0653    " Nephrology Nephrology

## 2021-07-28 ENCOUNTER — HOSPITAL ENCOUNTER (EMERGENCY)
Facility: CLINIC | Age: 31
Discharge: HOME | End: 2021-07-28
Attending: EMERGENCY MEDICINE | Admitting: EMERGENCY MEDICINE
Payer: COMMERCIAL

## 2021-07-28 ENCOUNTER — TELEPHONE (OUTPATIENT)
Dept: BEHAVIORAL HEALTH | Facility: CLINIC | Age: 31
End: 2021-07-28

## 2021-07-28 VITALS
OXYGEN SATURATION: 98 % | HEART RATE: 97 BPM | TEMPERATURE: 97.7 F | SYSTOLIC BLOOD PRESSURE: 138 MMHG | RESPIRATION RATE: 18 BRPM | DIASTOLIC BLOOD PRESSURE: 87 MMHG | BODY MASS INDEX: 32.82 KG/M2 | HEIGHT: 66 IN | WEIGHT: 204.2 LBS

## 2021-07-28 DIAGNOSIS — F22 DELUSIONS (H): ICD-10-CM

## 2021-07-28 DIAGNOSIS — F25.9 SCHIZOAFFECTIVE DISORDER, UNSPECIFIED TYPE (H): ICD-10-CM

## 2021-07-28 LAB
ALCOHOL BREATH TEST: 0 (ref 0–0.01)
SARS-COV-2 RNA RESP QL NAA+PROBE: NEGATIVE

## 2021-07-28 PROCEDURE — 99285 EMERGENCY DEPT VISIT HI MDM: CPT | Mod: 25 | Performed by: EMERGENCY MEDICINE

## 2021-07-28 PROCEDURE — 90791 PSYCH DIAGNOSTIC EVALUATION: CPT | Performed by: EMERGENCY MEDICINE

## 2021-07-28 PROCEDURE — 99285 EMERGENCY DEPT VISIT HI MDM: CPT | Performed by: EMERGENCY MEDICINE

## 2021-07-28 PROCEDURE — U0005 INFEC AGEN DETEC AMPLI PROBE: HCPCS | Performed by: FAMILY MEDICINE

## 2021-07-28 PROCEDURE — C9803 HOPD COVID-19 SPEC COLLECT: HCPCS | Performed by: EMERGENCY MEDICINE

## 2021-07-28 PROCEDURE — 82075 ASSAY OF BREATH ETHANOL: CPT | Performed by: EMERGENCY MEDICINE

## 2021-07-28 RX ORDER — PALIPERIDONE PALMITATE 156 MG/ML
156 INJECTION INTRAMUSCULAR
COMMUNITY
Start: 2021-08-09

## 2021-07-28 ASSESSMENT — MIFFLIN-ST. JEOR: SCORE: 1653

## 2021-07-28 ASSESSMENT — ENCOUNTER SYMPTOMS
ABDOMINAL PAIN: 0
SHORTNESS OF BREATH: 0
FEVER: 0

## 2021-07-28 NOTE — ED PROVIDER NOTES
"ED Provider Note  [facility]      History     Chief Complaint   Patient presents with     Suicidal     pt reported she's suicidal because people are following her..      Paranoid     pt reported she has been having difficulty sleeping. She's scared that the people following her will get into her house.      Delusional     pt stated \"I feel like they're making a movie out of my life.\"     HPI  Ilene Liu is a 31 year old female who presents to us with a chief complaint of suicidal ideation.  She states she is getting suicidal because she is tired of people following her.  She states for the last several weeks people that she does not know who they are have been following her everywhere she goes.  She does not know why they are following her either.  She does not any plan to hurt or kill her self.  She states that she is suicidal because she is just so tired of them following her.  She denies any previous mental health history.    Past Medical History  Past Medical History:   Diagnosis Date     Asthma      Cocaine abuse      Depressive disorder      Marijuana use      Schizo-affective type schizophrenia      Past Surgical History:   Procedure Laterality Date     ESOPHAGOSCOPY, GASTROSCOPY, DUODENOSCOPY (EGD), COMBINED N/A 12/17/2018    Procedure: COMBINED ESOPHAGOSCOPY, GASTROSCOPY, DUODENOSCOPY (EGD);  Surgeon: Cassie Rahman MD;  Location:  GI     [START ON 8/9/2021] paliperidone (INVEGA SUSTENNA) 156 MG/ML KADEEM injection  albuterol (PROAIR HFA/PROVENTIL HFA/VENTOLIN HFA) 108 (90 Base) MCG/ACT inhaler  benztropine (COGENTIN) 0.5 MG tablet  haloperidol (HALDOL) 10 MG tablet  haloperidol decanoate (HALDOL DECANOATE) 100 MG/ML injection  LORazepam (ATIVAN) 1 MG tablet  nicotine (NICODERM CQ) 14 MG/24HR 24 hr patch  nicotine (NICORETTE) 2 MG gum  polyethylene glycol (MIRALAX/GLYCOLAX) packet      No Known Allergies  Family History  History reviewed. No pertinent family history.  Social " "History   Social History     Tobacco Use     Smoking status: Current Some Day Smoker     Packs/day: 0.25     Smokeless tobacco: Never Used   Substance Use Topics     Alcohol use: Yes     Comment: not currently drinking anything     Drug use: Not Currently     Types: Marijuana, \"Crack\" cocaine      Past medical history, past surgical history, medications, allergies, family history, and social history were reviewed with the patient. No additional pertinent items.       Review of Systems   Constitutional: Negative for fever.   Respiratory: Negative for shortness of breath.    Cardiovascular: Negative for chest pain.   Gastrointestinal: Negative for abdominal pain.   Psychiatric/Behavioral: Positive for suicidal ideas.   All other systems reviewed and are negative.    A complete review of systems was performed with pertinent positives and negatives noted in the HPI, and all other systems negative.    Physical Exam   BP: 138/89  Pulse: 88  Temp: 97.2  F (36.2  C)  Resp: 16  Height: 167.6 cm (5' 6\")  Weight: 92.6 kg (204 lb 3.2 oz)  SpO2: 98 %  Physical Exam  Vitals and nursing note reviewed.   Constitutional:       General: She is not in acute distress.     Appearance: She is well-developed.   HENT:      Head: Normocephalic.   Eyes:      Extraocular Movements: Extraocular movements intact.   Pulmonary:      Effort: No respiratory distress.   Abdominal:      General: There is no distension.   Musculoskeletal:         General: No deformity.      Cervical back: Neck supple.   Skin:     General: Skin is dry.   Neurological:      Mental Status: She is alert.      Comments: alert   Psychiatric:         Behavior: Behavior normal.         ED Course      Procedures            No results found for any visits on 07/28/21.  Medications - No data to display     Assessments & Plan (with Medical Decision Making)   31-year-old female with history of schizoaffective disorder presents to us with a chief complaint of suicidal thoughts due " to people following her.  She had previous been evaluated for this last week at [facility].  We will have her seen by our mental health care .  Care will be signed out to the oncoming physician.    I have reviewed the nursing notes. I have reviewed the findings, diagnosis, plan and need for follow up with the patient.    New Prescriptions    No medications on file       Final diagnoses:   Delusions (H)   Schizoaffective disorder, unspecified type (H)       --  Zurdo Newman  [facility] EMERGENCY DEPARTMENT  7/28/2021     Zurdo Newman DO  07/28/21 0652

## 2021-07-28 NOTE — TELEPHONE ENCOUNTER
S:  12:30 PM  Call from DEC  requesting IP MH placement for a 30 YO F    B:  Hx of shizoeffecive bipolar type,  Borderline  Personality DO and polysub abuse presented to  ED with SI, paranoia and delusional.  She believes people are following her, is unable to sleep because she is scared people will enter her home and is suicidal because she can't take it anymore.  Currently on commitment   CM not revoking   Has an ACT team.  Receives  Invega   Injections, next one not due until  August but not taking other MH medications.  Hx of aggression July 12 arrested for assault, was jailed and then transferred to [facility] for one week.   Denies any acute medical concerns    COVID-19-in process  UTox-not collected  HCG-not collected  Alcohol Breath Test 0.00    VSS      A:  commitment    R:  Patient cleared and ready for behavioral bed placement: Yes     R:  2:45 PM  Patient discharged from the  ED-removed from adult work list.
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
## 2021-07-28 NOTE — DISCHARGE INSTRUCTIONS
Please continue your regular medications and follow-up with your ACT team and outpatient mental health providers.  If you begin to feel increasingly paranoid again, or have thoughts of harm to self or others, please return to the nearest emergency department for evaluation.    Contact your ACT team:  Roxann [phone]      Indications for return to Emergency Department or to seek further assistance:  Thoughts of harm to self or others that you feel you may act on  Thoughts of suicide  Feeling unsafe  Major changes in mood, sleep or apetite  Difficulty concentrating or completing regular daily tasks/ functions  Feelings of paranoia, or feelings that you are losing touch with reality  Resources for Mental Health:  *(asterisk indicates free service)    *National Suicide Prevention Lifeline  1-160.783.1956     *[facility]'s National Help Line  (Treatment Referral Routing for Mental & Chemical Health)  1-486.867.8239      *[facility]  [address] [place], [place]  [phone]  Mon, Wed, & Fri 1PM-3PM  Mon, Tues, Wed, & Thu 630PM-830PM  (no appointment needed)    *Walk-In Counseling Center- [facility]  [address], [place], [place]  Tue & Thu 6PM-8PM  (no appointment needed)    *[address], [place], [place]  [phone]  [email or web address]      [facility] Crisis Lines    [facility] Crisis Line  [phone] [facility] Crisis Line  [phone]   [facility] Crisis Line  [phone] [facility] Crisis Line  [phone]   [facility] Crisis Line  [phone] [facility] Crisis Line  [phone] for 8AM-430PM  [phone] for 430PM-8AM   [facility] Crisis Line  [phone] [facility] Crisis Line  [phone]     Outpatient Mental Health Clinics   *[facility]  [address] [place], [place]  [phone] *[facility]  [address] [place], [place]  [phone]    *[facility]  [facility])  [address] [place], [place]  [phone] Health Counseling Services  [address] [place] [address]  [phone]    [facility]  [address]  [place], [place]  [phone] [facility]  [address]  [place], [place]  [phone]   [facility]  [address] [place], [place]  [phone] [facility]  [address]  [place], [place]  [phone]   [facility]  [address] [place], [place]  [phone] [facility]  [address]  [place], [place]   [phone]   [facility]  [address]   [phone] Behavioral Health and Wellness Clinic  [address] [address] [phone]   *[facility] Crisis: COPE: ([phone]) 24 hour mobile crisis support for people having a mental health crisis in [facility].   *Acute Psychiatric Services ([phone]). 24-hour walk-in crisis psychiatric support at [facility]; Emergency Medications Clinic available 7:30am - 2:00pm  *Crisis Connection: ([phone]) 24-hour confidential telephone counseling   *[facility] Emergency Room: [phone]  *[place] [facility] ([facility]) : [facility] connects people seeking recovery to resources that help foster and sustain long-term recovery. Whether you are seeking resources for treatment, transportation, housing, job training, education, health or other pathways to recovery, [facility] is a great place to start. [phone] [email or web address]

## 2021-07-28 NOTE — ED NOTES
If I am feeling unsafe or I am in a crisis, I will:  Contact my established care providers   Call the National Suicide Prevention Lifeline: 689.998.3308   Go to the nearest emergency room   Call 817          Warning signs that I or other people might notice when a crisis is developing for me:   1 increased feelings of not feeling safe  2. Increased suicidal thoughts      Things I am able to do on my own to cope or help me feel better:   -talking to friends  -talking to family    Changes I can make to support my mental health and wellness:   -take medications as prescribed  -keep in touch with my ACT Team  Roxann at [phone]  -keep in touch with my ACT Team nurse, Carolyn at [phone]  -abstain from drugs and alcohol    People in my life that I can ask for help:   My ACT Team at [phone]      [facility] has a mental health crisis team you can call 24/7:   [facility] ([facility]) [phone]      Additional resources and information:   -Follow up with your ACT Team!!

## 2021-07-28 NOTE — ED NOTES
Patient refused to give urine sample. MD notified of patient refusal. MD went to speak with patient, patient still refused.
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
## 2023-05-26 NOTE — PROVIDER NOTIFICATION
"   01/03/19 1412   Behavioral Health   Suicidality (WDL) WDL   1. Wish to be Dead No   2. Non-Specific Active Suicidal Thoughts  No   3. Active Sucidal Ideation with any Methods (Not Plan) Without Intent to Act  No   4. Active Suicidal Ideation with Some Intent to Act, Without Specific Plan  No   5. Active Suicidal Ideation with Specific Plan and Intent  No   Change in Protective Factors? No   Enviromental Risk Factors None     Patient became agitated when informed that the wait for IRTS bed could be three weeks.  She stated that she could not tolerate being in the hospital for another three weeks.  She was very tense, yelling, impulsively threatening suicide:  \"I'd rather be dead than wait here three more weeks!\".  She perseverated on this for some time, naming several patients who have been recently discharged and stating that her peers were \"sicker than me!\".  She responded well to validation, support, and reassurance.  She was tearful toward the end of our discussion, but she was no longer agitated.  She was able to contract for safety, denying any active plan to engage in SIB or attempt suicide.  She added, \"I've got a daughter that I love!  I would never kill myself!- I just can't stand being here anymore!\".  She was offered PRN medication to further assist with de-escalation; however, she refused to take any PRN's.  At this time, there is no indication for the administration of emergency medications.  Will continue to monitor closely.  " Seeing Dr. Thornton pain mgmt, last MRI 10/2022, planning for L3-L4 KALLIE in 6/2023.Taking tylenol and gabapentin, avoiding NSAIDS 2/2 UC.  Did PT, now doing exercises at home.    10/2021 MRI  Moderate canal stenosis at L3-L4 due to diffuse disc bulge, facet joint osseous hypertrophy and ligamentum flavum hypertrophy.  Adjacent endplate edema at L2-3 consistent with active degenerative disc disease.

## 2024-01-26 NOTE — ED NOTES
"Patient witnessed on camera to be attempting to strangle self with hair wrap. Staff went hands on and released patient's arms/hands and removed wrap. Patient remained alert and able to communicate throughout time the wrap was around neck. Dr. Kang assessed patient and patient agreed to take PO zyprexa. PO medication given and patient agreed to be safe. Staff released hands on and patient was on 1:1 with psych associate. Patient immediately started yelling at security \"fuck you\" \"stop following me fucker.\" Patient making paranoid comments and escalating in behaviors. Patient offered the option to ride back to ED in wheelchair and patient refused. Patient then proceed to barricade herself in room and pound on glass of door and spit on door. Code 21 called and patient restrained and brought back to ED. Dr. Bates assessed patient and ordered restraints. Rosetta HERNANDEZ in ED given information.   " Never

## 2024-02-24 NOTE — ED TRIAGE NOTES
Pt BIBA after being found in grass on [address] and would not get up with PD direction. EMS called and pt brought in here. Pt reports being discharged from hospital today and drinking. Geraldine found around pt but pt denies drug use.    Pt son Delio [phone], was called and updated at pts request. Son reports pt has become completely incot and states she has had increasingly more behaviors. Son states pt was admitted a month ago for UTI. Son states pt is more aggressive and angry, more so with ADL care.

## (undated) RX ORDER — FENTANYL CITRATE 50 UG/ML
INJECTION, SOLUTION INTRAMUSCULAR; INTRAVENOUS
Status: DISPENSED
Start: 2018-12-17

## (undated) RX ORDER — SIMETHICONE 20 MG/.3ML
EMULSION ORAL
Status: DISPENSED
Start: 2018-12-17

## (undated) RX ORDER — DIPHENHYDRAMINE HYDROCHLORIDE 50 MG/ML
INJECTION INTRAMUSCULAR; INTRAVENOUS
Status: DISPENSED
Start: 2018-12-17